# Patient Record
Sex: FEMALE | Race: WHITE | NOT HISPANIC OR LATINO | URBAN - NONMETROPOLITAN AREA
[De-identification: names, ages, dates, MRNs, and addresses within clinical notes are randomized per-mention and may not be internally consistent; named-entity substitution may affect disease eponyms.]

---

## 2017-11-30 ENCOUNTER — OFFICE VISIT (OUTPATIENT)
Dept: FAMILY MEDICINE CLINIC | Facility: CLINIC | Age: 27
End: 2017-11-30

## 2017-11-30 VITALS
TEMPERATURE: 99.2 F | DIASTOLIC BLOOD PRESSURE: 78 MMHG | SYSTOLIC BLOOD PRESSURE: 120 MMHG | HEIGHT: 64 IN | HEART RATE: 108 BPM | OXYGEN SATURATION: 99 % | BODY MASS INDEX: 17.55 KG/M2 | WEIGHT: 102.8 LBS

## 2017-11-30 DIAGNOSIS — J06.9 ACUTE URI: ICD-10-CM

## 2017-11-30 DIAGNOSIS — M25.531 RIGHT WRIST PAIN: Primary | ICD-10-CM

## 2017-11-30 PROCEDURE — 99213 OFFICE O/P EST LOW 20 MIN: CPT | Performed by: FAMILY MEDICINE

## 2017-11-30 RX ORDER — GUAIFENESIN/DEXTROMETHORPHAN 100-10MG/5
10 SYRUP ORAL 4 TIMES DAILY PRN
Qty: 240 ML | Refills: 1 | Status: SHIPPED | OUTPATIENT
Start: 2017-11-30 | End: 2018-12-06

## 2017-11-30 RX ORDER — FLUTICASONE PROPIONATE 50 MCG
2 SPRAY, SUSPENSION (ML) NASAL DAILY
Qty: 1 BOTTLE | Refills: 11 | Status: SHIPPED | OUTPATIENT
Start: 2017-11-30 | End: 2018-12-06

## 2017-11-30 RX ORDER — IBUPROFEN 600 MG/1
600 TABLET ORAL EVERY 8 HOURS PRN
Qty: 60 TABLET | Refills: 0 | Status: SHIPPED | OUTPATIENT
Start: 2017-11-30 | End: 2018-12-06

## 2017-11-30 RX ORDER — LORATADINE 10 MG/1
10 TABLET ORAL DAILY
Qty: 30 TABLET | Refills: 11 | Status: SHIPPED | OUTPATIENT
Start: 2017-11-30 | End: 2018-12-06

## 2017-11-30 RX ORDER — ALBUTEROL SULFATE 90 UG/1
AEROSOL, METERED RESPIRATORY (INHALATION)
Refills: 0 | COMMUNITY
Start: 2017-10-08 | End: 2018-12-06

## 2017-11-30 NOTE — PROGRESS NOTES
Subjective   Blondy Jay Alas is a 26 y.o. female.     History of Present Illness     Right wrist pain 3 days.  No trauma but she says she is clumsey.    Also sinus symptoms and cough    Review of Systems   Constitutional: Negative for chills, fatigue and fever.   HENT: Positive for congestion and rhinorrhea. Negative for ear discharge, ear pain, facial swelling, hearing loss, postnasal drip, sinus pressure, sore throat, trouble swallowing and voice change.    Eyes: Negative for discharge, redness and visual disturbance.   Respiratory: Positive for cough. Negative for chest tightness, shortness of breath and wheezing.    Cardiovascular: Negative for chest pain and palpitations.   Gastrointestinal: Negative for abdominal pain, blood in stool, constipation, diarrhea, nausea and vomiting.   Endocrine: Negative for polydipsia and polyuria.   Genitourinary: Negative for dysuria, flank pain, hematuria and urgency.   Musculoskeletal: Negative for arthralgias, back pain, joint swelling and myalgias.   Skin: Negative for rash.   Neurological: Negative for dizziness, weakness, numbness and headaches.   Hematological: Negative for adenopathy.   Psychiatric/Behavioral: Negative for confusion and sleep disturbance. The patient is not nervous/anxious.        Objective   Physical Exam   Constitutional: She is oriented to person, place, and time. She appears well-developed and well-nourished.   HENT:   Head: Normocephalic and atraumatic.   Right Ear: External ear normal.   Left Ear: External ear normal.   Nose: Nose normal.   Eyes: Conjunctivae and EOM are normal. Pupils are equal, round, and reactive to light.   Neck: Normal range of motion.   Pulmonary/Chest: Effort normal.   Musculoskeletal: Normal range of motion.   Posterior distal ulna little more prominent right than left  But no swelling.  Some pain distal ulna with ulnar deviation of hand. No bruising, no redness.    Neurological: She is alert and oriented to  person, place, and time.   Psychiatric: She has a normal mood and affect. Her behavior is normal. Judgment and thought content normal.   Nursing note and vitals reviewed.      Assessment/Plan   Sebastian was seen today for wrist pain.    Diagnoses and all orders for this visit:    Right wrist pain  -     XR Wrist 3+ View Right    Acute URI    Other orders  -     loratadine (CLARITIN) 10 MG tablet; Take 1 tablet by mouth Daily.  -     fluticasone (FLONASE) 50 MCG/ACT nasal spray; 2 sprays into each nostril Daily.  -     ibuprofen (ADVIL,MOTRIN) 600 MG tablet; Take 1 tablet by mouth Every 8 (Eight) Hours As Needed for Mild Pain .  -     guaifenesin-dextromethorphan (ROBITUSSIN DM) 100-10 MG/5ML syrup; Take 10 mL by mouth 4 (Four) Times a Day As Needed for Cough.      Xray looks fine.  She has ace wrap at home.   Encouraged to stop smoking

## 2018-06-28 ENCOUNTER — HOSPITAL ENCOUNTER (OUTPATIENT)
Dept: ULTRASOUND IMAGING | Facility: HOSPITAL | Age: 28
Discharge: HOME | End: 2018-06-28
Admitting: OBSTETRICS & GYNECOLOGY

## 2018-06-28 DIAGNOSIS — R10.31 RIGHT LOWER QUADRANT PAIN: ICD-10-CM

## 2018-06-28 DIAGNOSIS — Z32.01 POSITIVE PREGNANCY TEST: ICD-10-CM

## 2018-06-28 PROCEDURE — 76817 TRANSVAGINAL US OBSTETRIC: CPT

## 2018-11-19 ENCOUNTER — HOSPITAL ENCOUNTER (OUTPATIENT)
Facility: HOSPITAL | Age: 28
Discharge: HOME | End: 2018-11-20
Attending: OBSTETRICS & GYNECOLOGY | Admitting: OBSTETRICS & GYNECOLOGY

## 2018-11-19 VITALS
OXYGEN SATURATION: 98 % | TEMPERATURE: 98.1 F | RESPIRATION RATE: 18 BRPM | DIASTOLIC BLOOD PRESSURE: 70 MMHG | HEART RATE: 107 BPM | SYSTOLIC BLOOD PRESSURE: 113 MMHG

## 2018-11-19 PROBLEM — Z37.9 NORMAL LABOR: Status: ACTIVE | Noted: 2018-11-19

## 2018-11-19 LAB
BACTERIA UR QL AUTO: ABNORMAL /HPF
BILIRUB UR QL STRIP: NEGATIVE
CLARITY UR: ABNORMAL
COLOR UR: YELLOW
GLUCOSE UR STRIP-MCNC: NEGATIVE MG/DL
HGB UR QL STRIP.AUTO: ABNORMAL
HYALINE CASTS UR QL AUTO: ABNORMAL /LPF
KETONES UR QL STRIP: NEGATIVE
LEUKOCYTE ESTERASE UR QL STRIP.AUTO: ABNORMAL
NITRITE UR QL STRIP: NEGATIVE
PH UR STRIP.AUTO: 6 [PH] (ref 5–9)
PROT UR QL STRIP: NEGATIVE
RBC # UR: ABNORMAL /HPF
REF LAB TEST METHOD: ABNORMAL
SP GR UR STRIP: 1.01 (ref 1–1.03)
SQUAMOUS #/AREA URNS HPF: ABNORMAL /HPF
TRICHOMONAS #/AREA URNS HPF: ABNORMAL /HPF
UROBILINOGEN UR QL STRIP: ABNORMAL
WBC UR QL AUTO: ABNORMAL /HPF

## 2018-11-19 PROCEDURE — 81001 URINALYSIS AUTO W/SCOPE: CPT | Performed by: OBSTETRICS & GYNECOLOGY

## 2018-11-19 PROCEDURE — 87086 URINE CULTURE/COLONY COUNT: CPT | Performed by: OBSTETRICS & GYNECOLOGY

## 2018-11-19 RX ORDER — METRONIDAZOLE 500 MG/1
TABLET ORAL
Status: DISCONTINUED
Start: 2018-11-19 | End: 2018-11-20 | Stop reason: HOSPADM

## 2018-11-19 RX ORDER — SODIUM CHLORIDE 0.9 % (FLUSH) 0.9 %
3-10 SYRINGE (ML) INJECTION AS NEEDED
Status: DISCONTINUED | OUTPATIENT
Start: 2018-11-19 | End: 2018-11-19

## 2018-11-19 RX ORDER — METRONIDAZOLE 500 MG/1
2000 TABLET ORAL ONCE
Status: COMPLETED | OUTPATIENT
Start: 2018-11-20 | End: 2018-11-19

## 2018-11-19 RX ORDER — SODIUM CHLORIDE, SODIUM LACTATE, POTASSIUM CHLORIDE, CALCIUM CHLORIDE 600; 310; 30; 20 MG/100ML; MG/100ML; MG/100ML; MG/100ML
INJECTION, SOLUTION INTRAVENOUS
Status: COMPLETED
Start: 2018-11-19 | End: 2018-11-19

## 2018-11-19 RX ORDER — MISOPROSTOL 200 UG/1
800 TABLET ORAL AS NEEDED
Status: CANCELLED | OUTPATIENT
Start: 2018-11-19

## 2018-11-19 RX ORDER — SODIUM CHLORIDE 0.9 % (FLUSH) 0.9 %
5 SYRINGE (ML) INJECTION AS NEEDED
Status: DISCONTINUED | OUTPATIENT
Start: 2018-11-19 | End: 2018-11-20 | Stop reason: HOSPADM

## 2018-11-19 RX ORDER — SODIUM CHLORIDE 0.9 % (FLUSH) 0.9 %
3 SYRINGE (ML) INJECTION EVERY 12 HOURS SCHEDULED
Status: DISCONTINUED | OUTPATIENT
Start: 2018-11-19 | End: 2018-11-20 | Stop reason: HOSPADM

## 2018-11-19 RX ORDER — CARBOPROST TROMETHAMINE 250 UG/ML
250 INJECTION, SOLUTION INTRAMUSCULAR AS NEEDED
Status: CANCELLED | OUTPATIENT
Start: 2018-11-19

## 2018-11-19 RX ORDER — SODIUM CHLORIDE, SODIUM LACTATE, POTASSIUM CHLORIDE, CALCIUM CHLORIDE 600; 310; 30; 20 MG/100ML; MG/100ML; MG/100ML; MG/100ML
125 INJECTION, SOLUTION INTRAVENOUS CONTINUOUS
Status: DISCONTINUED | OUTPATIENT
Start: 2018-11-19 | End: 2018-11-19

## 2018-11-19 RX ORDER — METHYLERGONOVINE MALEATE 0.2 MG/ML
200 INJECTION INTRAVENOUS ONCE AS NEEDED
Status: CANCELLED | OUTPATIENT
Start: 2018-11-19

## 2018-11-19 RX ORDER — SODIUM CHLORIDE 0.9 % (FLUSH) 0.9 %
3 SYRINGE (ML) INJECTION EVERY 12 HOURS SCHEDULED
Status: DISCONTINUED | OUTPATIENT
Start: 2018-11-19 | End: 2018-11-19

## 2018-11-19 RX ORDER — SODIUM CHLORIDE, SODIUM LACTATE, POTASSIUM CHLORIDE, CALCIUM CHLORIDE 600; 310; 30; 20 MG/100ML; MG/100ML; MG/100ML; MG/100ML
INJECTION, SOLUTION INTRAVENOUS
Status: DISCONTINUED
Start: 2018-11-19 | End: 2018-11-20 | Stop reason: HOSPADM

## 2018-11-19 RX ADMIN — METRONIDAZOLE 2000 MG: 500 TABLET ORAL at 23:53

## 2018-11-19 RX ADMIN — SODIUM CHLORIDE, SODIUM LACTATE, POTASSIUM CHLORIDE, AND CALCIUM CHLORIDE 1000 ML: 600; 310; 30; 20 INJECTION, SOLUTION INTRAVENOUS at 23:52

## 2018-11-20 PROCEDURE — 59025 FETAL NON-STRESS TEST: CPT

## 2018-11-20 PROCEDURE — G0463 HOSPITAL OUTPT CLINIC VISIT: HCPCS

## 2018-11-20 PROCEDURE — 59025 FETAL NON-STRESS TEST: CPT | Performed by: OBSTETRICS & GYNECOLOGY

## 2018-11-20 RX ORDER — SODIUM CHLORIDE, SODIUM LACTATE, POTASSIUM CHLORIDE, CALCIUM CHLORIDE 600; 310; 30; 20 MG/100ML; MG/100ML; MG/100ML; MG/100ML
INJECTION, SOLUTION INTRAVENOUS
Status: DISCONTINUED
Start: 2018-11-20 | End: 2018-11-20 | Stop reason: WASHOUT

## 2018-11-20 NOTE — DISCHARGE INSTR - ACTIVITY
Return if vaginal bleeding, think water has broken, decreased fetal movements, or strong regular contractions. Drink plenty of water and keep all scheduled Dr appointments. Make appointment in 2 weeks to be tested again.

## 2018-11-20 NOTE — NON STRESS TEST
Sebastian Toledoky Alas, a  at 26w5d with an ALYSSA of 2019, by Patient Reported, was seen at [facility] LABOR DELIVERY for a nonstress test.    Chief Complaint   Patient presents with   • Contractions     Pt c/o contractions since around 1900 this evening.  Denies vaginal bleeding or leaking fluid.  Reports fetal movement.         Patient Active Problem List   Diagnosis   • Normal labor       Start Time: 15 Stop Time:45    Interpretation A  Nonstress Test Interpretation A: Reactive (18 : Ashtyn Pires, RN)  Comments A: reviewed with MARC Torres RN (18 : Ashtyn Pires, RN)  Pt received 2 bags of LR bolused. Trichimonas noted in urine.  Treated before discharged.  Instructed to follow up for retest in 2 weeks with

## 2018-11-21 LAB — BACTERIA SPEC AEROBE CULT: NORMAL

## 2018-11-29 ENCOUNTER — ROUTINE PRENATAL (OUTPATIENT)
Dept: OBSTETRICS AND GYNECOLOGY | Facility: CLINIC | Age: 28
End: 2018-11-29

## 2018-11-29 VITALS — WEIGHT: 137.5 LBS | BODY MASS INDEX: 23.97 KG/M2 | DIASTOLIC BLOOD PRESSURE: 70 MMHG | SYSTOLIC BLOOD PRESSURE: 104 MMHG

## 2018-11-29 PROCEDURE — 96372 THER/PROPH/DIAG INJ SC/IM: CPT | Performed by: OBSTETRICS & GYNECOLOGY

## 2018-11-29 RX ORDER — HYDROXYPROGESTERONE CAPROATE 250 MG/ML
250 INJECTION INTRAMUSCULAR
Status: DISCONTINUED | OUTPATIENT
Start: 2018-11-29 | End: 2019-01-28

## 2018-11-29 RX ADMIN — HYDROXYPROGESTERONE CAPROATE 250 MG: 250 INJECTION INTRAMUSCULAR at 13:10

## 2018-12-06 ENCOUNTER — ROUTINE PRENATAL (OUTPATIENT)
Dept: OBSTETRICS AND GYNECOLOGY | Facility: CLINIC | Age: 28
End: 2018-12-06

## 2018-12-06 VITALS
BODY MASS INDEX: 23.91 KG/M2 | WEIGHT: 137.13 LBS | SYSTOLIC BLOOD PRESSURE: 103 MMHG | DIASTOLIC BLOOD PRESSURE: 59 MMHG

## 2018-12-06 DIAGNOSIS — O09.899 HISTORY OF PRETERM DELIVERY, CURRENTLY PREGNANT: ICD-10-CM

## 2018-12-06 DIAGNOSIS — Z86.19 HISTORY OF TRICHOMONIASIS: Primary | ICD-10-CM

## 2018-12-06 PROCEDURE — 87661 TRICHOMONAS VAGINALIS AMPLIF: CPT | Performed by: OBSTETRICS & GYNECOLOGY

## 2018-12-06 PROCEDURE — 87491 CHLMYD TRACH DNA AMP PROBE: CPT | Performed by: OBSTETRICS & GYNECOLOGY

## 2018-12-06 PROCEDURE — 99213 OFFICE O/P EST LOW 20 MIN: CPT | Performed by: OBSTETRICS & GYNECOLOGY

## 2018-12-06 PROCEDURE — 87591 N.GONORRHOEAE DNA AMP PROB: CPT | Performed by: OBSTETRICS & GYNECOLOGY

## 2018-12-06 PROCEDURE — 96372 THER/PROPH/DIAG INJ SC/IM: CPT | Performed by: OBSTETRICS & GYNECOLOGY

## 2018-12-06 RX ADMIN — HYDROXYPROGESTERONE CAPROATE 250 MG: 250 INJECTION INTRAMUSCULAR at 13:14

## 2018-12-07 LAB
C TRACH RRNA CVX QL NAA+PROBE: NEGATIVE
N GONORRHOEA RRNA SPEC QL NAA+PROBE: NEGATIVE
TRICHOMONAS VAGINALIS PCR: NEGATIVE

## 2018-12-13 ENCOUNTER — CLINICAL SUPPORT (OUTPATIENT)
Dept: OBSTETRICS AND GYNECOLOGY | Facility: CLINIC | Age: 28
End: 2018-12-13

## 2018-12-13 PROCEDURE — 96372 THER/PROPH/DIAG INJ SC/IM: CPT | Performed by: OBSTETRICS & GYNECOLOGY

## 2018-12-13 RX ADMIN — HYDROXYPROGESTERONE CAPROATE 250 MG: 250 INJECTION INTRAMUSCULAR at 14:32

## 2018-12-17 NOTE — PROGRESS NOTES
Complaint prenatal care    Patient transferred care from [facility] we don't have all her records available number    #1 history  birth on making XE back in 1 week

## 2018-12-18 RX ORDER — HYDROXYPROGESTERONE CAPROATE 250 MG/ML
250 INJECTION INTRAMUSCULAR
Status: DISCONTINUED | OUTPATIENT
Start: 2018-12-18 | End: 2018-12-27

## 2018-12-20 ENCOUNTER — ROUTINE PRENATAL (OUTPATIENT)
Dept: OBSTETRICS AND GYNECOLOGY | Facility: CLINIC | Age: 28
End: 2018-12-20

## 2018-12-20 VITALS — SYSTOLIC BLOOD PRESSURE: 110 MMHG | WEIGHT: 141.6 LBS | BODY MASS INDEX: 24.69 KG/M2 | DIASTOLIC BLOOD PRESSURE: 70 MMHG

## 2018-12-20 DIAGNOSIS — Z87.51 HISTORY OF PRETERM DELIVERY: ICD-10-CM

## 2018-12-20 DIAGNOSIS — O09.93 HIGH-RISK PREGNANCY, THIRD TRIMESTER: Primary | ICD-10-CM

## 2018-12-20 DIAGNOSIS — Z3A.31 31 WEEKS GESTATION OF PREGNANCY: ICD-10-CM

## 2018-12-20 DIAGNOSIS — O60.03 PRETERM LABOR IN THIRD TRIMESTER WITHOUT DELIVERY: ICD-10-CM

## 2018-12-20 PROCEDURE — 96372 THER/PROPH/DIAG INJ SC/IM: CPT | Performed by: OBSTETRICS & GYNECOLOGY

## 2018-12-20 PROCEDURE — 99213 OFFICE O/P EST LOW 20 MIN: CPT | Performed by: OBSTETRICS & GYNECOLOGY

## 2018-12-20 RX ORDER — ALBUTEROL SULFATE 90 UG/1
2 AEROSOL, METERED RESPIRATORY (INHALATION) EVERY 6 HOURS PRN
COMMUNITY
Start: 2018-12-10 | End: 2019-09-19

## 2018-12-20 RX ADMIN — HYDROXYPROGESTERONE CAPROATE 250 MG: 250 INJECTION INTRAMUSCULAR at 14:41

## 2018-12-27 ENCOUNTER — APPOINTMENT (OUTPATIENT)
Dept: ULTRASOUND IMAGING | Facility: HOSPITAL | Age: 28
End: 2018-12-27

## 2018-12-27 ENCOUNTER — ROUTINE PRENATAL (OUTPATIENT)
Dept: OBSTETRICS AND GYNECOLOGY | Facility: CLINIC | Age: 28
End: 2018-12-27

## 2018-12-27 ENCOUNTER — HOSPITAL ENCOUNTER (OUTPATIENT)
Facility: HOSPITAL | Age: 28
Setting detail: OBSERVATION
Discharge: HOME | End: 2018-12-28
Attending: OBSTETRICS & GYNECOLOGY | Admitting: OBSTETRICS & GYNECOLOGY

## 2018-12-27 ENCOUNTER — LAB (OUTPATIENT)
Dept: LAB | Facility: HOSPITAL | Age: 28
End: 2018-12-27

## 2018-12-27 VITALS — DIASTOLIC BLOOD PRESSURE: 73 MMHG | WEIGHT: 141.8 LBS | BODY MASS INDEX: 24.72 KG/M2 | SYSTOLIC BLOOD PRESSURE: 127 MMHG

## 2018-12-27 DIAGNOSIS — Z3A.32 32 WEEKS GESTATION OF PREGNANCY: Primary | ICD-10-CM

## 2018-12-27 DIAGNOSIS — O23.593 VAGINITIS AFFECTING PREGNANCY IN THIRD TRIMESTER, ANTEPARTUM: ICD-10-CM

## 2018-12-27 DIAGNOSIS — O09.213 HISTORY OF PRETERM LABOR, CURRENT PREGNANCY, THIRD TRIMESTER: ICD-10-CM

## 2018-12-27 DIAGNOSIS — O09.93 HIGH-RISK PREGNANCY, THIRD TRIMESTER: ICD-10-CM

## 2018-12-27 DIAGNOSIS — O47.03 PRETERM UTERINE CONTRACTIONS, ANTEPARTUM, THIRD TRIMESTER: ICD-10-CM

## 2018-12-27 PROBLEM — R87.89 POSITIVE FETAL FIBRONECTIN AT 22 WEEKS TO 34 WEEKS GESTATION: Status: ACTIVE | Noted: 2018-12-27

## 2018-12-27 PROBLEM — O09.899 POSITIVE FETAL FIBRONECTIN AT 22 WEEKS TO 34 WEEKS GESTATION: Status: ACTIVE | Noted: 2018-12-27

## 2018-12-27 LAB
ABO GROUP BLD: NORMAL
AMPHET+METHAMPHET UR QL: NEGATIVE
BARBITURATES UR QL SCN: NEGATIVE
BENZODIAZ UR QL SCN: NEGATIVE
BLD GP AB SCN SERPL QL: NEGATIVE
BLOODY SPECIMEN?: ABNORMAL
CANDIDA ALBICANS: NEGATIVE
CANNABINOIDS SERPL QL: NEGATIVE
COCAINE UR QL: NEGATIVE
DEPRECATED RDW RBC AUTO: 41.9 FL (ref 36.4–46.3)
ERYTHROCYTE [DISTWIDTH] IN BLOOD BY AUTOMATED COUNT: 13.1 % (ref 11.5–14.5)
FIBRONECTIN FETAL VAG QL: POSITIVE
GARDNERELLA VAGINALIS: POSITIVE
GLUCOSE 1H P 100 G GLC PO SERPL-MCNC: 105 MG/DL (ref 60–140)
HCT VFR BLD AUTO: 34.2 % (ref 35–45)
HGB BLD-MCNC: 12 G/DL (ref 12–15.5)
Lab: NORMAL
MCH RBC QN AUTO: 30.3 PG (ref 26.5–34)
MCHC RBC AUTO-ENTMCNC: 35.1 G/DL (ref 31.4–36)
MCV RBC AUTO: 86.4 FL (ref 80–98)
METHADONE UR QL SCN: NEGATIVE
OPIATES UR QL: NEGATIVE
OXYCODONE UR QL SCN: NEGATIVE
PLATELET # BLD AUTO: 309 10*3/MM3 (ref 150–450)
PMV BLD AUTO: 9.7 FL (ref 8–12)
RBC # BLD AUTO: 3.96 10*6/MM3 (ref 3.77–5.16)
RH BLD: POSITIVE
T&S EXPIRATION DATE: NORMAL
TRICHOMONAS VAGINALIS PCR: NEGATIVE
WBC NRBC COR # BLD: 20.33 10*3/MM3 (ref 3.2–9.8)

## 2018-12-27 PROCEDURE — 87480 CANDIDA DNA DIR PROBE: CPT | Performed by: OBSTETRICS & GYNECOLOGY

## 2018-12-27 PROCEDURE — 80307 DRUG TEST PRSMV CHEM ANLYZR: CPT | Performed by: OBSTETRICS & GYNECOLOGY

## 2018-12-27 PROCEDURE — 99220 PR INITIAL OBSERVATION CARE/DAY 70 MINUTES: CPT | Performed by: OBSTETRICS & GYNECOLOGY

## 2018-12-27 PROCEDURE — 96372 THER/PROPH/DIAG INJ SC/IM: CPT | Performed by: OBSTETRICS & GYNECOLOGY

## 2018-12-27 PROCEDURE — 86900 BLOOD TYPING SEROLOGIC ABO: CPT | Performed by: OBSTETRICS & GYNECOLOGY

## 2018-12-27 PROCEDURE — 59025 FETAL NON-STRESS TEST: CPT

## 2018-12-27 PROCEDURE — 76805 OB US >/= 14 WKS SNGL FETUS: CPT

## 2018-12-27 PROCEDURE — 59025 FETAL NON-STRESS TEST: CPT | Performed by: OBSTETRICS & GYNECOLOGY

## 2018-12-27 PROCEDURE — 82950 GLUCOSE TEST: CPT

## 2018-12-27 PROCEDURE — 25010000002 BETAMETHASONE ACET & SOD PHOS PER 4 MG: Performed by: OBSTETRICS & GYNECOLOGY

## 2018-12-27 PROCEDURE — 87510 GARDNER VAG DNA DIR PROBE: CPT | Performed by: OBSTETRICS & GYNECOLOGY

## 2018-12-27 PROCEDURE — 76817 TRANSVAGINAL US OBSTETRIC: CPT

## 2018-12-27 PROCEDURE — 85027 COMPLETE CBC AUTOMATED: CPT | Performed by: OBSTETRICS & GYNECOLOGY

## 2018-12-27 PROCEDURE — 25010000002 TERBUTALINE PER 1 MG

## 2018-12-27 PROCEDURE — 86901 BLOOD TYPING SEROLOGIC RH(D): CPT | Performed by: OBSTETRICS & GYNECOLOGY

## 2018-12-27 PROCEDURE — G0378 HOSPITAL OBSERVATION PER HR: HCPCS

## 2018-12-27 PROCEDURE — 87660 TRICHOMONAS VAGIN DIR PROBE: CPT | Performed by: OBSTETRICS & GYNECOLOGY

## 2018-12-27 PROCEDURE — 96372 THER/PROPH/DIAG INJ SC/IM: CPT

## 2018-12-27 PROCEDURE — 82731 ASSAY OF FETAL FIBRONECTIN: CPT | Performed by: OBSTETRICS & GYNECOLOGY

## 2018-12-27 PROCEDURE — 86850 RBC ANTIBODY SCREEN: CPT | Performed by: OBSTETRICS & GYNECOLOGY

## 2018-12-27 PROCEDURE — 99213 OFFICE O/P EST LOW 20 MIN: CPT | Performed by: OBSTETRICS & GYNECOLOGY

## 2018-12-27 RX ORDER — DEXTROSE, SODIUM CHLORIDE, SODIUM LACTATE, POTASSIUM CHLORIDE, AND CALCIUM CHLORIDE 5; .6; .31; .03; .02 G/100ML; G/100ML; G/100ML; G/100ML; G/100ML
999 INJECTION, SOLUTION INTRAVENOUS ONCE
Status: COMPLETED | OUTPATIENT
Start: 2018-12-28 | End: 2018-12-27

## 2018-12-27 RX ORDER — BETAMETHASONE SODIUM PHOSPHATE AND BETAMETHASONE ACETATE 3; 3 MG/ML; MG/ML
12 INJECTION, SUSPENSION INTRA-ARTICULAR; INTRALESIONAL; INTRAMUSCULAR; SOFT TISSUE EVERY 24 HOURS
Status: COMPLETED | OUTPATIENT
Start: 2018-12-27 | End: 2018-12-28

## 2018-12-27 RX ORDER — SODIUM CHLORIDE 0.9 % (FLUSH) 0.9 %
3-10 SYRINGE (ML) INJECTION AS NEEDED
Status: DISCONTINUED | OUTPATIENT
Start: 2018-12-27 | End: 2018-12-27

## 2018-12-27 RX ORDER — LIDOCAINE HYDROCHLORIDE 10 MG/ML
5 INJECTION, SOLUTION EPIDURAL; INFILTRATION; INTRACAUDAL; PERINEURAL AS NEEDED
Status: DISCONTINUED | OUTPATIENT
Start: 2018-12-27 | End: 2018-12-27

## 2018-12-27 RX ORDER — DEXTROSE, SODIUM CHLORIDE, SODIUM LACTATE, POTASSIUM CHLORIDE, AND CALCIUM CHLORIDE 5; .6; .31; .03; .02 G/100ML; G/100ML; G/100ML; G/100ML; G/100ML
75 INJECTION, SOLUTION INTRAVENOUS CONTINUOUS
Status: DISCONTINUED | OUTPATIENT
Start: 2018-12-28 | End: 2018-12-28 | Stop reason: HOSPADM

## 2018-12-27 RX ORDER — HYDROXYPROGESTERONE CAPROATE 250 MG/ML
250 INJECTION INTRAMUSCULAR
Status: DISCONTINUED | OUTPATIENT
Start: 2018-12-27 | End: 2018-12-27

## 2018-12-27 RX ORDER — DEXTROSE, SODIUM CHLORIDE, SODIUM LACTATE, POTASSIUM CHLORIDE, AND CALCIUM CHLORIDE 5; .6; .31; .03; .02 G/100ML; G/100ML; G/100ML; G/100ML; G/100ML
INJECTION, SOLUTION INTRAVENOUS
Status: COMPLETED
Start: 2018-12-27 | End: 2018-12-27

## 2018-12-27 RX ORDER — TERBUTALINE SULFATE 1 MG/ML
0.25 INJECTION, SOLUTION SUBCUTANEOUS ONCE
Status: COMPLETED | OUTPATIENT
Start: 2018-12-28 | End: 2018-12-27

## 2018-12-27 RX ORDER — SODIUM CHLORIDE 0.9 % (FLUSH) 0.9 %
5 SYRINGE (ML) INJECTION AS NEEDED
Status: DISCONTINUED | OUTPATIENT
Start: 2018-12-27 | End: 2018-12-28 | Stop reason: HOSPADM

## 2018-12-27 RX ORDER — SODIUM CHLORIDE 0.9 % (FLUSH) 0.9 %
3 SYRINGE (ML) INJECTION EVERY 12 HOURS SCHEDULED
Status: DISCONTINUED | OUTPATIENT
Start: 2018-12-28 | End: 2018-12-28 | Stop reason: HOSPADM

## 2018-12-27 RX ORDER — SODIUM CHLORIDE 0.9 % (FLUSH) 0.9 %
3 SYRINGE (ML) INJECTION EVERY 12 HOURS SCHEDULED
Status: DISCONTINUED | OUTPATIENT
Start: 2018-12-27 | End: 2018-12-27

## 2018-12-27 RX ORDER — TERBUTALINE SULFATE 1 MG/ML
INJECTION, SOLUTION SUBCUTANEOUS
Status: COMPLETED
Start: 2018-12-27 | End: 2018-12-27

## 2018-12-27 RX ORDER — ZOLPIDEM TARTRATE 5 MG/1
5 TABLET ORAL NIGHTLY PRN
Status: DISCONTINUED | OUTPATIENT
Start: 2018-12-27 | End: 2018-12-28 | Stop reason: HOSPADM

## 2018-12-27 RX ADMIN — SODIUM CHLORIDE, SODIUM LACTATE, POTASSIUM CHLORIDE, CALCIUM CHLORIDE AND DEXTROSE MONOHYDRATE 999 ML/HR: 5; 600; 310; 30; 20 INJECTION, SOLUTION INTRAVENOUS at 23:51

## 2018-12-27 RX ADMIN — HYDROXYPROGESTERONE CAPROATE 250 MG: 250 INJECTION INTRAMUSCULAR at 12:08

## 2018-12-27 RX ADMIN — BETAMETHASONE SODIUM PHOSPHATE AND BETAMETHASONE ACETATE 12 MG: 3; 3 INJECTION, SUSPENSION INTRA-ARTICULAR; INTRALESIONAL; INTRAMUSCULAR at 19:18

## 2018-12-27 RX ADMIN — TERBUTALINE SULFATE 0.25 MG: 1 INJECTION, SOLUTION SUBCUTANEOUS at 23:47

## 2018-12-27 RX ADMIN — DEXTROSE, SODIUM CHLORIDE, SODIUM LACTATE, POTASSIUM CHLORIDE, AND CALCIUM CHLORIDE 999 ML/HR: 5; .6; .31; .03; .02 INJECTION, SOLUTION INTRAVENOUS at 23:51

## 2018-12-28 ENCOUNTER — APPOINTMENT (OUTPATIENT)
Dept: ULTRASOUND IMAGING | Facility: HOSPITAL | Age: 28
End: 2018-12-28

## 2018-12-28 VITALS
HEART RATE: 91 BPM | TEMPERATURE: 98.8 F | BODY MASS INDEX: 24.59 KG/M2 | SYSTOLIC BLOOD PRESSURE: 117 MMHG | DIASTOLIC BLOOD PRESSURE: 57 MMHG | RESPIRATION RATE: 18 BRPM | WEIGHT: 141 LBS | OXYGEN SATURATION: 98 %

## 2018-12-28 PROCEDURE — 59025 FETAL NON-STRESS TEST: CPT | Performed by: OBSTETRICS & GYNECOLOGY

## 2018-12-28 PROCEDURE — 25010000002 ONDANSETRON PER 1 MG: Performed by: OBSTETRICS & GYNECOLOGY

## 2018-12-28 PROCEDURE — 96374 THER/PROPH/DIAG INJ IV PUSH: CPT

## 2018-12-28 PROCEDURE — G0463 HOSPITAL OUTPT CLINIC VISIT: HCPCS

## 2018-12-28 PROCEDURE — 96372 THER/PROPH/DIAG INJ SC/IM: CPT

## 2018-12-28 PROCEDURE — 59025 FETAL NON-STRESS TEST: CPT

## 2018-12-28 PROCEDURE — 99217 PR OBSERVATION CARE DISCHARGE MANAGEMENT: CPT | Performed by: OBSTETRICS & GYNECOLOGY

## 2018-12-28 PROCEDURE — 76820 UMBILICAL ARTERY ECHO: CPT

## 2018-12-28 PROCEDURE — 25010000002 TERBUTALINE PER 1 MG: Performed by: OBSTETRICS & GYNECOLOGY

## 2018-12-28 PROCEDURE — 96361 HYDRATE IV INFUSION ADD-ON: CPT

## 2018-12-28 PROCEDURE — G0378 HOSPITAL OBSERVATION PER HR: HCPCS

## 2018-12-28 PROCEDURE — 25010000002 BETAMETHASONE ACET & SOD PHOS PER 4 MG: Performed by: OBSTETRICS & GYNECOLOGY

## 2018-12-28 PROCEDURE — 76819 FETAL BIOPHYS PROFIL W/O NST: CPT

## 2018-12-28 RX ORDER — ONDANSETRON HCL IN 0.9 % NACL 8 MG/50 ML
8 INTRAVENOUS SOLUTION, PIGGYBACK (ML) INTRAVENOUS EVERY 6 HOURS PRN
Status: DISCONTINUED | OUTPATIENT
Start: 2018-12-28 | End: 2018-12-28 | Stop reason: HOSPADM

## 2018-12-28 RX ORDER — NIFEDIPINE 10 MG/1
20 CAPSULE ORAL EVERY 6 HOURS SCHEDULED
Status: DISCONTINUED | OUTPATIENT
Start: 2018-12-28 | End: 2018-12-28 | Stop reason: HOSPADM

## 2018-12-28 RX ORDER — ACETAMINOPHEN 500 MG
1000 TABLET ORAL EVERY 4 HOURS PRN
Status: DISCONTINUED | OUTPATIENT
Start: 2018-12-28 | End: 2018-12-28 | Stop reason: HOSPADM

## 2018-12-28 RX ORDER — TERBUTALINE SULFATE 1 MG/ML
0.25 INJECTION, SOLUTION SUBCUTANEOUS ONCE
Status: COMPLETED | OUTPATIENT
Start: 2018-12-28 | End: 2018-12-28

## 2018-12-28 RX ORDER — DEXTROSE, SODIUM CHLORIDE, SODIUM LACTATE, POTASSIUM CHLORIDE, AND CALCIUM CHLORIDE 5; .6; .31; .03; .02 G/100ML; G/100ML; G/100ML; G/100ML; G/100ML
INJECTION, SOLUTION INTRAVENOUS
Status: DISCONTINUED
Start: 2018-12-28 | End: 2018-12-28 | Stop reason: HOSPADM

## 2018-12-28 RX ORDER — PROMETHAZINE HYDROCHLORIDE 25 MG/ML
12.5 INJECTION, SOLUTION INTRAMUSCULAR; INTRAVENOUS EVERY 4 HOURS PRN
Status: DISCONTINUED | OUTPATIENT
Start: 2018-12-28 | End: 2018-12-28 | Stop reason: HOSPADM

## 2018-12-28 RX ORDER — METRONIDAZOLE 7.5 MG/G
GEL VAGINAL NIGHTLY
Status: DISCONTINUED | OUTPATIENT
Start: 2018-12-28 | End: 2018-12-28 | Stop reason: HOSPADM

## 2018-12-28 RX ORDER — CALCIUM CARBONATE 200(500)MG
TABLET,CHEWABLE ORAL
Status: COMPLETED
Start: 2018-12-28 | End: 2018-12-28

## 2018-12-28 RX ORDER — CALCIUM CARBONATE 200(500)MG
2 TABLET,CHEWABLE ORAL 3 TIMES DAILY PRN
Status: DISCONTINUED | OUTPATIENT
Start: 2018-12-28 | End: 2018-12-28 | Stop reason: HOSPADM

## 2018-12-28 RX ORDER — NIFEDIPINE 10 MG/1
CAPSULE ORAL
Status: COMPLETED
Start: 2018-12-28 | End: 2018-12-28

## 2018-12-28 RX ADMIN — TERBUTALINE SULFATE 0.25 MG: 1 INJECTION SUBCUTANEOUS at 04:44

## 2018-12-28 RX ADMIN — Medication 2 TABLET: at 00:16

## 2018-12-28 RX ADMIN — NIFEDIPINE 20 MG: 10 CAPSULE ORAL at 12:50

## 2018-12-28 RX ADMIN — SODIUM CHLORIDE, SODIUM LACTATE, POTASSIUM CHLORIDE, CALCIUM CHLORIDE AND DEXTROSE MONOHYDRATE 75 ML/HR: 5; 600; 310; 30; 20 INJECTION, SOLUTION INTRAVENOUS at 00:33

## 2018-12-28 RX ADMIN — BETAMETHASONE SODIUM PHOSPHATE AND BETAMETHASONE ACETATE 12 MG: 3; 3 INJECTION, SUSPENSION INTRA-ARTICULAR; INTRALESIONAL; INTRAMUSCULAR at 17:44

## 2018-12-28 RX ADMIN — SODIUM CHLORIDE 8 MG: 9 INJECTION, SOLUTION INTRAVENOUS at 02:02

## 2018-12-28 RX ADMIN — NIFEDIPINE 20 MG: 10 CAPSULE ORAL at 08:07

## 2018-12-28 RX ADMIN — SODIUM CHLORIDE, SODIUM LACTATE, POTASSIUM CHLORIDE, CALCIUM CHLORIDE AND DEXTROSE MONOHYDRATE 75 ML/HR: 5; 600; 310; 30; 20 INJECTION, SOLUTION INTRAVENOUS at 15:03

## 2018-12-28 RX ADMIN — CALCIUM CARBONATE (ANTACID) CHEW TAB 500 MG 2 TABLET: 500 CHEW TAB at 00:16

## 2018-12-30 PROBLEM — Z87.51 HISTORY OF PRETERM DELIVERY: Status: ACTIVE | Noted: 2018-12-30

## 2018-12-30 PROBLEM — Z3A.31 31 WEEKS GESTATION OF PREGNANCY: Status: ACTIVE | Noted: 2018-12-30

## 2019-01-03 ENCOUNTER — HOSPITAL ENCOUNTER (OUTPATIENT)
Facility: HOSPITAL | Age: 29
Discharge: HOME | End: 2019-01-03
Attending: OBSTETRICS & GYNECOLOGY | Admitting: OBSTETRICS & GYNECOLOGY

## 2019-01-03 ENCOUNTER — APPOINTMENT (OUTPATIENT)
Dept: ULTRASOUND IMAGING | Facility: HOSPITAL | Age: 29
End: 2019-01-03

## 2019-01-03 ENCOUNTER — ROUTINE PRENATAL (OUTPATIENT)
Dept: OBSTETRICS AND GYNECOLOGY | Facility: CLINIC | Age: 29
End: 2019-01-03

## 2019-01-03 VITALS
TEMPERATURE: 98.1 F | HEART RATE: 98 BPM | SYSTOLIC BLOOD PRESSURE: 132 MMHG | OXYGEN SATURATION: 99 % | DIASTOLIC BLOOD PRESSURE: 74 MMHG

## 2019-01-03 VITALS — DIASTOLIC BLOOD PRESSURE: 68 MMHG | BODY MASS INDEX: 25 KG/M2 | WEIGHT: 143.4 LBS | SYSTOLIC BLOOD PRESSURE: 110 MMHG

## 2019-01-03 DIAGNOSIS — O09.213 HIGH RISK PREGNANCY DUE TO HISTORY OF PRETERM LABOR, THIRD TRIMESTER: ICD-10-CM

## 2019-01-03 DIAGNOSIS — Z3A.33 33 WEEKS GESTATION OF PREGNANCY: ICD-10-CM

## 2019-01-03 DIAGNOSIS — O47.03 PRETERM UTERINE CONTRACTIONS, ANTEPARTUM, THIRD TRIMESTER: Primary | ICD-10-CM

## 2019-01-03 DIAGNOSIS — O36.5931 IUGR (INTRAUTERINE GROWTH RESTRICTION) AFFECTING CARE OF MOTHER, THIRD TRIMESTER, FETUS 1: ICD-10-CM

## 2019-01-03 LAB
BLOODY SPECIMEN?: NO
FIBRONECTIN FETAL VAG QL: NEGATIVE

## 2019-01-03 PROCEDURE — 99213 OFFICE O/P EST LOW 20 MIN: CPT | Performed by: OBSTETRICS & GYNECOLOGY

## 2019-01-03 PROCEDURE — 76817 TRANSVAGINAL US OBSTETRIC: CPT

## 2019-01-03 PROCEDURE — 82731 ASSAY OF FETAL FIBRONECTIN: CPT | Performed by: OBSTETRICS & GYNECOLOGY

## 2019-01-03 PROCEDURE — G0463 HOSPITAL OUTPT CLINIC VISIT: HCPCS

## 2019-01-03 PROCEDURE — 76819 FETAL BIOPHYS PROFIL W/O NST: CPT

## 2019-01-03 PROCEDURE — 76820 UMBILICAL ARTERY ECHO: CPT

## 2019-01-03 PROCEDURE — 59025 FETAL NON-STRESS TEST: CPT

## 2019-01-03 PROCEDURE — 96372 THER/PROPH/DIAG INJ SC/IM: CPT | Performed by: OBSTETRICS & GYNECOLOGY

## 2019-01-03 RX ORDER — ZOLPIDEM TARTRATE 5 MG/1
5 TABLET ORAL NIGHTLY PRN
Status: CANCELLED | OUTPATIENT
Start: 2019-01-03 | End: 2019-01-13

## 2019-01-03 NOTE — NON STRESS TEST
Sebastian Toledoky García, a  at 33w0d with an ALYSSA of 2019, by Patient Reported, was seen at [facility] LABOR DELIVERY for a nonstress test.    Chief Complaint   Patient presents with   • Abdominal Cramping       Patient Active Problem List   Diagnosis   • Normal labor   •  labor in second trimester with  delivery in third trimester, fetus 1   • Positive fetal fibronectin at 22 weeks to 34 weeks gestation   • History of  delivery   • 31 weeks gestation of pregnancy       Start Time:   Stop Time:     Interpretation A  Nonstress Test Interpretation A: Reactive (19 : Paulina Lewis, RN)  Comments A: strip reviewed with VICKIE Concepcion RN (19 : Paulina Lewis, RN)

## 2019-01-04 NOTE — DISCHARGE INSTR - ACTIVITY
Return for painful/regular contractions, vaginal bleeding or leaking of fluids, or decreased fetal movement. Keep next follow up appointment. Call with any further questions or concerns.

## 2019-01-04 NOTE — NON STRESS TEST
Manuelmilton Toledoky García, a  at 33w0d with an ALYSSA of 2019, by Patient Reported, was [facility] for a nonstress test.    Chief Complaint   Patient presents with   • Abdominal Cramping       Patient Active Problem List   Diagnosis   • Normal labor   •  labor in second trimester with  delivery in third trimester, fetus 1   • Positive fetal fibronectin at 22 weeks to 34 weeks gestation   • History of  delivery   • 31 weeks gestation of pregnancy       Start Time:   Stop Time:     Interpretation A  Nonstress Test Interpretation A: Reactive (19 : Princess Tinajero, RN)    FFN negative today, BPP 8/8, with NST 10/10, cervical length 3 cm, umbilical flow studies done today. Flagyl called into [facility]. Pt assessed per Dr Johnson prior to D/C. Pt educated on importance of treating BV to help decrease contractions and decrease chance of  labor.

## 2019-01-04 NOTE — PROGRESS NOTES
Incisions she's having some contractions her cervix is fingertip dilated.  She had had a fetal fibronectin that was positive recently.  She said nothing in the vagina for 24 hours we repeated today before examination and sent to labor and delivery    She also has recently diagnosed IUGR and I wanted her to get a Doppler today and a biophysical profile but it hadn't been arranged will have this done in labor and delivery

## 2019-01-05 PROBLEM — Z3A.31 31 WEEKS GESTATION OF PREGNANCY: Status: RESOLVED | Noted: 2018-12-30 | Resolved: 2019-01-05

## 2019-01-05 PROBLEM — Z3A.32 32 WEEKS GESTATION OF PREGNANCY: Status: ACTIVE | Noted: 2019-01-05

## 2019-01-05 PROBLEM — O09.213 HISTORY OF PRETERM LABOR, CURRENT PREGNANCY, THIRD TRIMESTER: Status: ACTIVE | Noted: 2019-01-05

## 2019-01-05 PROBLEM — O47.03 PRETERM UTERINE CONTRACTIONS, ANTEPARTUM, THIRD TRIMESTER: Status: ACTIVE | Noted: 2019-01-05

## 2019-01-06 NOTE — PROGRESS NOTES
Chief complaint ear for prenatal care    #1 history  birth on [place] injection given today    #2 I'm questioning says she's been having some contractions nothing in vagina for 24 hours fetal fibronectin done cervix is fingertip and this is positive will call

## 2019-01-07 ENCOUNTER — ROUTINE PRENATAL (OUTPATIENT)
Dept: OBSTETRICS AND GYNECOLOGY | Facility: CLINIC | Age: 29
End: 2019-01-07

## 2019-01-07 VITALS — DIASTOLIC BLOOD PRESSURE: 68 MMHG | BODY MASS INDEX: 24.65 KG/M2 | WEIGHT: 141.4 LBS | SYSTOLIC BLOOD PRESSURE: 98 MMHG

## 2019-01-07 DIAGNOSIS — O28.8 NST (NON-STRESS TEST) NONREACTIVE: ICD-10-CM

## 2019-01-07 DIAGNOSIS — O36.5930 POOR FETAL GROWTH AFFECTING MANAGEMENT OF MOTHER IN THIRD TRIMESTER, SINGLE OR UNSPECIFIED FETUS: Primary | ICD-10-CM

## 2019-01-07 PROCEDURE — 99213 OFFICE O/P EST LOW 20 MIN: CPT | Performed by: OBSTETRICS & GYNECOLOGY

## 2019-01-07 RX ORDER — METRONIDAZOLE 500 MG/1
1 TABLET ORAL DAILY
Refills: 0 | COMMUNITY
Start: 2019-01-03 | End: 2019-01-14

## 2019-01-10 ENCOUNTER — HOSPITAL ENCOUNTER (OUTPATIENT)
Dept: ULTRASOUND IMAGING | Facility: HOSPITAL | Age: 29
Discharge: HOME | End: 2019-01-10

## 2019-01-10 ENCOUNTER — HOSPITAL ENCOUNTER (OUTPATIENT)
Dept: ULTRASOUND IMAGING | Facility: HOSPITAL | Age: 29
Discharge: HOME | End: 2019-01-10
Admitting: OBSTETRICS & GYNECOLOGY

## 2019-01-10 ENCOUNTER — ROUTINE PRENATAL (OUTPATIENT)
Dept: OBSTETRICS AND GYNECOLOGY | Facility: CLINIC | Age: 29
End: 2019-01-10

## 2019-01-10 VITALS — WEIGHT: 146.6 LBS | DIASTOLIC BLOOD PRESSURE: 68 MMHG | SYSTOLIC BLOOD PRESSURE: 104 MMHG | BODY MASS INDEX: 25.56 KG/M2

## 2019-01-10 DIAGNOSIS — O36.5930 POOR FETAL GROWTH AFFECTING MANAGEMENT OF MOTHER IN THIRD TRIMESTER, SINGLE OR UNSPECIFIED FETUS: ICD-10-CM

## 2019-01-10 DIAGNOSIS — Z3A.34 34 WEEKS GESTATION OF PREGNANCY: Primary | ICD-10-CM

## 2019-01-10 DIAGNOSIS — O36.5910: ICD-10-CM

## 2019-01-10 PROCEDURE — 76820 UMBILICAL ARTERY ECHO: CPT

## 2019-01-10 PROCEDURE — 99213 OFFICE O/P EST LOW 20 MIN: CPT | Performed by: OBSTETRICS & GYNECOLOGY

## 2019-01-10 PROCEDURE — 96372 THER/PROPH/DIAG INJ SC/IM: CPT | Performed by: OBSTETRICS & GYNECOLOGY

## 2019-01-10 PROCEDURE — 76819 FETAL BIOPHYS PROFIL W/O NST: CPT

## 2019-01-10 RX ADMIN — HYDROXYPROGESTERONE CAPROATE 250 MG: 250 INJECTION INTRAMUSCULAR at 16:19

## 2019-01-13 NOTE — PROGRESS NOTES
Chief complaint here for prenatal care #1 history of pretty term birth on [place] injection given today    #2 IUGR fetal well-being testing done today

## 2019-01-14 ENCOUNTER — HOSPITAL ENCOUNTER (OUTPATIENT)
Facility: HOSPITAL | Age: 29
Setting detail: OBSERVATION
Discharge: HOME | End: 2019-01-15
Attending: OBSTETRICS & GYNECOLOGY | Admitting: OBSTETRICS & GYNECOLOGY

## 2019-01-14 ENCOUNTER — ROUTINE PRENATAL (OUTPATIENT)
Dept: OBSTETRICS AND GYNECOLOGY | Facility: CLINIC | Age: 29
End: 2019-01-14

## 2019-01-14 VITALS — WEIGHT: 144.2 LBS | BODY MASS INDEX: 25.14 KG/M2 | DIASTOLIC BLOOD PRESSURE: 60 MMHG | SYSTOLIC BLOOD PRESSURE: 110 MMHG

## 2019-01-14 DIAGNOSIS — O36.5930 POOR FETAL GROWTH AFFECTING MANAGEMENT OF MOTHER IN THIRD TRIMESTER, SINGLE OR UNSPECIFIED FETUS: ICD-10-CM

## 2019-01-14 DIAGNOSIS — O47.00 PRETERM CONTRACTIONS: Primary | ICD-10-CM

## 2019-01-14 DIAGNOSIS — Z3A.34 34 WEEKS GESTATION OF PREGNANCY: ICD-10-CM

## 2019-01-14 DIAGNOSIS — O28.8 NST (NON-STRESS TEST) NONREACTIVE: ICD-10-CM

## 2019-01-14 PROBLEM — O36.5990 IUGR (INTRAUTERINE GROWTH RESTRICTION) AFFECTING CARE OF MOTHER: Status: ACTIVE | Noted: 2019-01-14

## 2019-01-14 LAB
ABO GROUP BLD: NORMAL
ALBUMIN SERPL-MCNC: 3.4 G/DL (ref 3.4–4.8)
ALBUMIN/GLOB SERPL: 1.3 G/DL (ref 1.1–1.8)
ALP SERPL-CCNC: 125 U/L (ref 38–126)
ALT SERPL W P-5'-P-CCNC: 12 U/L (ref 9–52)
AMPHET+METHAMPHET UR QL: NEGATIVE
ANION GAP SERPL CALCULATED.3IONS-SCNC: 4 MMOL/L (ref 5–15)
AST SERPL-CCNC: 10 U/L (ref 14–36)
BARBITURATES UR QL SCN: NEGATIVE
BENZODIAZ UR QL SCN: NEGATIVE
BILIRUB SERPL-MCNC: 0.3 MG/DL (ref 0.2–1.3)
BLD GP AB SCN SERPL QL: NEGATIVE
BLOODY SPECIMEN?: NO
BUN BLD-MCNC: 5 MG/DL (ref 7–21)
BUN/CREAT SERPL: 10.4 (ref 7–25)
CALCIUM SPEC-SCNC: 8.5 MG/DL (ref 8.4–10.2)
CANNABINOIDS SERPL QL: NEGATIVE
CHLORIDE SERPL-SCNC: 103 MMOL/L (ref 95–110)
CO2 SERPL-SCNC: 23 MMOL/L (ref 22–31)
COCAINE UR QL: NEGATIVE
CREAT BLD-MCNC: 0.48 MG/DL (ref 0.5–1)
DEPRECATED RDW RBC AUTO: 43.3 FL (ref 36.4–46.3)
ERYTHROCYTE [DISTWIDTH] IN BLOOD BY AUTOMATED COUNT: 13.6 % (ref 11.5–14.5)
FIBRONECTIN FETAL VAG QL: POSITIVE
GFR SERPL CREATININE-BSD FRML MDRD: 156 ML/MIN/1.73 (ref 71–165)
GLOBULIN UR ELPH-MCNC: 2.6 GM/DL (ref 2.3–3.5)
GLUCOSE BLD-MCNC: 85 MG/DL (ref 60–100)
HCT VFR BLD AUTO: 34.4 % (ref 35–45)
HGB BLD-MCNC: 11.8 G/DL (ref 12–15.5)
HOLD SPECIMEN: NORMAL
LDH SERPL-CCNC: 274 U/L (ref 313–618)
Lab: NORMAL
MCH RBC QN AUTO: 29.6 PG (ref 26.5–34)
MCHC RBC AUTO-ENTMCNC: 34.3 G/DL (ref 31.4–36)
MCV RBC AUTO: 86.4 FL (ref 80–98)
METHADONE UR QL SCN: NEGATIVE
OPIATES UR QL: NEGATIVE
OXYCODONE UR QL SCN: NEGATIVE
PLATELET # BLD AUTO: 326 10*3/MM3 (ref 150–450)
PMV BLD AUTO: 10.1 FL (ref 8–12)
POTASSIUM BLD-SCNC: 3.7 MMOL/L (ref 3.5–5.1)
PROT SERPL-MCNC: 6 G/DL (ref 6.3–8.6)
RBC # BLD AUTO: 3.98 10*6/MM3 (ref 3.77–5.16)
RH BLD: POSITIVE
SODIUM BLD-SCNC: 130 MMOL/L (ref 137–145)
T&S EXPIRATION DATE: NORMAL
URATE SERPL-MCNC: 2.9 MG/DL (ref 2.5–8.5)
WBC NRBC COR # BLD: 21.97 10*3/MM3 (ref 3.2–9.8)

## 2019-01-14 PROCEDURE — G0378 HOSPITAL OBSERVATION PER HR: HCPCS

## 2019-01-14 PROCEDURE — 59025 FETAL NON-STRESS TEST: CPT | Performed by: OBSTETRICS & GYNECOLOGY

## 2019-01-14 PROCEDURE — 80053 COMPREHEN METABOLIC PANEL: CPT | Performed by: OBSTETRICS & GYNECOLOGY

## 2019-01-14 PROCEDURE — 99213 OFFICE O/P EST LOW 20 MIN: CPT | Performed by: OBSTETRICS & GYNECOLOGY

## 2019-01-14 PROCEDURE — 83615 LACTATE (LD) (LDH) ENZYME: CPT | Performed by: OBSTETRICS & GYNECOLOGY

## 2019-01-14 PROCEDURE — 80307 DRUG TEST PRSMV CHEM ANLYZR: CPT | Performed by: OBSTETRICS & GYNECOLOGY

## 2019-01-14 PROCEDURE — 85027 COMPLETE CBC AUTOMATED: CPT | Performed by: OBSTETRICS & GYNECOLOGY

## 2019-01-14 PROCEDURE — 86900 BLOOD TYPING SEROLOGIC ABO: CPT | Performed by: OBSTETRICS & GYNECOLOGY

## 2019-01-14 PROCEDURE — 86901 BLOOD TYPING SEROLOGIC RH(D): CPT | Performed by: OBSTETRICS & GYNECOLOGY

## 2019-01-14 PROCEDURE — 86850 RBC ANTIBODY SCREEN: CPT | Performed by: OBSTETRICS & GYNECOLOGY

## 2019-01-14 PROCEDURE — 82731 ASSAY OF FETAL FIBRONECTIN: CPT | Performed by: OBSTETRICS & GYNECOLOGY

## 2019-01-14 PROCEDURE — 84550 ASSAY OF BLOOD/URIC ACID: CPT | Performed by: OBSTETRICS & GYNECOLOGY

## 2019-01-14 PROCEDURE — 36415 COLL VENOUS BLD VENIPUNCTURE: CPT | Performed by: OBSTETRICS & GYNECOLOGY

## 2019-01-14 PROCEDURE — 87653 STREP B DNA AMP PROBE: CPT | Performed by: OBSTETRICS & GYNECOLOGY

## 2019-01-14 PROCEDURE — 96361 HYDRATE IV INFUSION ADD-ON: CPT

## 2019-01-14 RX ORDER — SODIUM CHLORIDE 0.9 % (FLUSH) 0.9 %
3-10 SYRINGE (ML) INJECTION AS NEEDED
Status: DISCONTINUED | OUTPATIENT
Start: 2019-01-14 | End: 2019-01-14

## 2019-01-14 RX ORDER — SODIUM CHLORIDE 0.9 % (FLUSH) 0.9 %
3 SYRINGE (ML) INJECTION EVERY 12 HOURS SCHEDULED
Status: DISCONTINUED | OUTPATIENT
Start: 2019-01-14 | End: 2019-01-14

## 2019-01-14 RX ORDER — SODIUM CHLORIDE, SODIUM LACTATE, POTASSIUM CHLORIDE, CALCIUM CHLORIDE 600; 310; 30; 20 MG/100ML; MG/100ML; MG/100ML; MG/100ML
125 INJECTION, SOLUTION INTRAVENOUS CONTINUOUS
Status: DISCONTINUED | OUTPATIENT
Start: 2019-01-14 | End: 2019-01-15 | Stop reason: HOSPADM

## 2019-01-14 RX ORDER — ZOLPIDEM TARTRATE 5 MG/1
5 TABLET ORAL NIGHTLY PRN
Status: DISCONTINUED | OUTPATIENT
Start: 2019-01-14 | End: 2019-01-14

## 2019-01-14 RX ORDER — LIDOCAINE HYDROCHLORIDE 10 MG/ML
5 INJECTION, SOLUTION EPIDURAL; INFILTRATION; INTRACAUDAL; PERINEURAL AS NEEDED
Status: DISCONTINUED | OUTPATIENT
Start: 2019-01-14 | End: 2019-01-14

## 2019-01-14 RX ORDER — SODIUM CHLORIDE, SODIUM LACTATE, POTASSIUM CHLORIDE, CALCIUM CHLORIDE 600; 310; 30; 20 MG/100ML; MG/100ML; MG/100ML; MG/100ML
INJECTION, SOLUTION INTRAVENOUS
Status: COMPLETED
Start: 2019-01-14 | End: 2019-01-14

## 2019-01-14 RX ADMIN — SODIUM CHLORIDE, POTASSIUM CHLORIDE, SODIUM LACTATE AND CALCIUM CHLORIDE 1000 ML: 600; 310; 30; 20 INJECTION, SOLUTION INTRAVENOUS at 18:05

## 2019-01-14 RX ADMIN — SODIUM CHLORIDE, SODIUM LACTATE, POTASSIUM CHLORIDE, CALCIUM CHLORIDE 125 ML/HR: 600; 310; 30; 20 INJECTION, SOLUTION INTRAVENOUS at 18:56

## 2019-01-14 RX ADMIN — SODIUM CHLORIDE, POTASSIUM CHLORIDE, SODIUM LACTATE AND CALCIUM CHLORIDE 125 ML/HR: 600; 310; 30; 20 INJECTION, SOLUTION INTRAVENOUS at 18:56

## 2019-01-14 NOTE — PROGRESS NOTES
Chief complaint here for prenatal care    #1 history of  birth on [place] #2 IUGR biophysical physical profile with Dopplers performed today

## 2019-01-15 ENCOUNTER — APPOINTMENT (OUTPATIENT)
Dept: ULTRASOUND IMAGING | Facility: HOSPITAL | Age: 29
End: 2019-01-15

## 2019-01-15 VITALS
HEART RATE: 78 BPM | SYSTOLIC BLOOD PRESSURE: 81 MMHG | OXYGEN SATURATION: 92 % | RESPIRATION RATE: 18 BRPM | WEIGHT: 144 LBS | TEMPERATURE: 98.2 F | BODY MASS INDEX: 25.11 KG/M2 | DIASTOLIC BLOOD PRESSURE: 50 MMHG

## 2019-01-15 PROCEDURE — 96361 HYDRATE IV INFUSION ADD-ON: CPT

## 2019-01-15 PROCEDURE — 59025 FETAL NON-STRESS TEST: CPT | Performed by: OBSTETRICS & GYNECOLOGY

## 2019-01-15 PROCEDURE — 76819 FETAL BIOPHYS PROFIL W/O NST: CPT

## 2019-01-15 PROCEDURE — 59025 FETAL NON-STRESS TEST: CPT

## 2019-01-15 PROCEDURE — 99235 HOSP IP/OBS SAME DATE MOD 70: CPT | Performed by: OBSTETRICS & GYNECOLOGY

## 2019-01-15 PROCEDURE — 96374 THER/PROPH/DIAG INJ IV PUSH: CPT

## 2019-01-15 PROCEDURE — G0378 HOSPITAL OBSERVATION PER HR: HCPCS

## 2019-01-15 PROCEDURE — 25010000002 PROMETHAZINE PER 50 MG

## 2019-01-15 PROCEDURE — G0463 HOSPITAL OUTPT CLINIC VISIT: HCPCS

## 2019-01-15 PROCEDURE — 25010000002 BUTORPHANOL PER 1 MG: Performed by: OBSTETRICS & GYNECOLOGY

## 2019-01-15 PROCEDURE — 96375 TX/PRO/DX INJ NEW DRUG ADDON: CPT

## 2019-01-15 RX ORDER — BUTORPHANOL TARTRATE 1 MG/ML
1 INJECTION, SOLUTION INTRAMUSCULAR; INTRAVENOUS ONCE
Status: COMPLETED | OUTPATIENT
Start: 2019-01-15 | End: 2019-01-15

## 2019-01-15 RX ORDER — PROMETHAZINE HYDROCHLORIDE 25 MG/ML
INJECTION, SOLUTION INTRAMUSCULAR; INTRAVENOUS
Status: COMPLETED
Start: 2019-01-15 | End: 2019-01-15

## 2019-01-15 RX ORDER — PROMETHAZINE HYDROCHLORIDE 25 MG/ML
12.5 INJECTION, SOLUTION INTRAMUSCULAR; INTRAVENOUS ONCE
Status: COMPLETED | OUTPATIENT
Start: 2019-01-15 | End: 2019-01-15

## 2019-01-15 RX ORDER — SODIUM CHLORIDE, SODIUM LACTATE, POTASSIUM CHLORIDE, CALCIUM CHLORIDE 600; 310; 30; 20 MG/100ML; MG/100ML; MG/100ML; MG/100ML
INJECTION, SOLUTION INTRAVENOUS
Status: COMPLETED
Start: 2019-01-15 | End: 2019-01-15

## 2019-01-15 RX ADMIN — SODIUM CHLORIDE, SODIUM LACTATE, POTASSIUM CHLORIDE, CALCIUM CHLORIDE 125 ML/HR: 600; 310; 30; 20 INJECTION, SOLUTION INTRAVENOUS at 01:52

## 2019-01-15 RX ADMIN — BUTORPHANOL TARTRATE 1 MG: 1 INJECTION, SOLUTION INTRAMUSCULAR; INTRAVENOUS at 04:47

## 2019-01-15 RX ADMIN — SODIUM CHLORIDE, POTASSIUM CHLORIDE, SODIUM LACTATE AND CALCIUM CHLORIDE 125 ML/HR: 600; 310; 30; 20 INJECTION, SOLUTION INTRAVENOUS at 01:52

## 2019-01-15 RX ADMIN — PROMETHAZINE HYDROCHLORIDE 12.5 MG: 25 INJECTION, SOLUTION INTRAMUSCULAR; INTRAVENOUS at 04:47

## 2019-01-15 RX ADMIN — PROMETHAZINE HYDROCHLORIDE 12.5 MG: 25 INJECTION INTRAMUSCULAR; INTRAVENOUS at 04:47

## 2019-01-15 NOTE — H&P
[facility]  Obstetric History and Physical    Chief Complaint   Patient presents with   • Abnormal Imaging     BPP /, sent from office for monitoring      contractions; history  birth      Patient is a 28 y.o. female  currently at 34w5d, who presents with having been seen in the office today noted to be having an nonstress test regular uterine contractions her cervix was closed.  Even though she was 34-5/7 and went ahead and did a fetal fibronectin that turned out to be positive she's changed her cervix to about 2 cm meters but hasn't made further cervical change.  Given she is over 34 weeks will not plan   tortocolysis.  She has had steroids a couple weeks ago.  We've been following her for in her uterine growth restriction so that's why we had done a NST today.      Her prenatal care is complicated by  abnormal fetal growth  IUGR.       Obstetric History   #: 1, Date: , Sex: None, Weight: None, GA: None, Delivery: Spontaneous , Apgar1: None, Apgar5: None, Living: None, Birth Comments: None    #: 2, Date: , Sex: None, Weight: None, GA: None, Delivery: Vaginal, Spontaneous, Apgar1: None, Apgar5: None, Living: Living, Birth Comments: None    #: 3, Date: , Sex: None, Weight: None, GA: None, Delivery: Spontaneous , Apgar1: None, Apgar5: None, Living: None, Birth Comments: None    #: 4, Date: None, Sex: None, Weight: None, GA: None, Delivery: None, Apgar1: None, Apgar5: None, Living: None, Birth Comments: None       The following portions of the patients history were reviewed and updated as appropriate: current medications, allergies, past medical history, past surgical history, past family history, past social history and problem list .       Prenatal Information:  Prenatal Results     Initial Prenatal Labs     Test Value Reference Range Date Time    Hemoglobin        Hematocrit        Platelets 326 10*3/mm3 150 - 450 10*3/mm3 19 1842    Rubella IgG         Hepatitis B SAg        Hepatitis C Ab        RPR        ABO A   01/14/19 1842    Rh Positive   01/14/19 1842    Antibody Screen        HIV        Urine Culture Mixed Keila Isolated   11/19/18 2236    Gonorrhea Negative  Negative 12/06/18 1144    Chlamydia Negative  Negative 12/06/18 1144    TSH              2nd and 3rd Trimester     Test Value Reference Range Date Time    Hemoglobin (repeated) 11.8 g/dL 12.0 - 15.5 g/dL 01/14/19 1842    Hematocrit (repeated) 34.4 % 35.0 - 45.0 % 01/14/19 1842     mg/dL 60 - 140 mg/dL 12/27/18 1146    Antibody Screen (repeated) Negative   01/14/19 1842    GTT Fasting        GTT 1 Hr        GTT 2 Hr        GTT 3 Hr        Group B Strep              Drug Screening     Test Value Reference Range Date Time    Amphetamine Screen Negative  Negative 01/14/19 1807    Barbiturate Screen Negative  Negative 01/14/19 1807    Benzodiazepine Screen Negative  Negative 01/14/19 1807    Methadone Screen Negative  Negative 01/14/19 1807    Phencyclidine Screen        Opiates Screen Negative  Negative 01/14/19 1807    THC Screen Negative  Negative 01/14/19 1807    Cocaine Screen Negative  Negative 01/14/19 1807    Propoxyphene Screen        Buprenorphine Screen        Methamphetamine Screen        Oxycodone Screen Negative  Negative 01/14/19 1807    Tricyclic Antidepressants Screen              Other (Risk screening)     Test Value Reference Range Date Time    Varicella IgG        Parvovirus IgG        CMV IgG        Cystic Fibrosis        Hemoglobin electrophoresis        NIPT        MSAFP-4        AFP (for NTD only)                  External Prenatal Results     Pregnancy Outside Results - Transcribed From Office Records - See Scanned Records For Details     Test Value Date Time    Hgb 11.8 g/dL 01/14/19 1842    Hct 34.4 % 01/14/19 1842    ABO A  01/14/19 1842    Rh Positive  01/14/19 1842    Antibody Screen Negative  01/14/19 1842    Glucose Fasting GTT       Glucose Tolerance Test 1 hour        Glucose Tolerance Test 3 hour       Gonorrhea (discrete) Negative  18 1144    Chlamydia (discrete) Negative  18 1144    RPR       VDRL       Syphilis Antibody       Rubella       HBsAg       Herpes Simplex Virus PCR       Herpes Simplex VIrus Culture       HIV       Hep C RNA Quant PCR       Hep C Antibody       AFP       Group B Strep       GBS Susceptibility to Clindamycin       GBS Susceptibility to Erythromycin       Fetal Fibronectin Positive  19 1537    Genetic Testing, Maternal Blood             Drug Screening     Test Value Date Time    Urine Drug Screen       Amphetamine Screen Negative  19 1807    Barbiturate Screen Negative  19 1807    Benzodiazepine Screen Negative  19 1807    Methadone Screen Negative  19 1807    Phencyclidine Screen       Opiates Screen Negative  19 1807    THC Screen Negative  19 180    Cocaine Screen       Propoxyphene Screen       Buprenorphine Screen       Methamphetamine Screen       Oxycodone Screen Negative  19 1807    Tricyclic Antidepressants Screen                    Past OB History:     Obstetric History       T0      L1     SAB2   TAB0   Ectopic0   Molar0   Multiple0   Live Births1       # Outcome Date GA Lbr Vern/2nd Weight Sex Delivery Anes PTL Lv   4 Current            3 2018     SAB      2  2015     Vag-Spont   ISAIAH   1 2009     SAB              ALLERGIES:   No Known Allergies     Home Medications:     Prior to Admission medications    Medication Sig Start Date End Date Taking? Authorizing Provider   albuterol sulfate HFA (VENTOLIN HFA) 108 (90 Base) MCG/ACT inhaler Inhale 2 puffs Every 6 (Six) Hours As Needed. 12/10/18   Provider, MD Arturo       Past Medical History: Past Medical History:   Diagnosis Date   • Abnormal Pap smear of cervix    • Anxiety    • Asthma    • Cervical dysplasia    • Cyst of ovary    • Palpitations    • Tobacco dependence syndrome    • URI (upper  respiratory infection)       Past Surgical History Past Surgical History:   Procedure Laterality Date   • CERVICAL BIOPSY  W/ LOOP ELECTRODE EXCISION     • ENDOSCOPY  01/27/2012    Normal esophagus. Gastritis in stomach. Biopsy taken. Normal duodenum. Biopsy taken.   • ENDOSCOPY AND COLONOSCOPY  01/27/2012    Colitis in rectum. Biopsy taken.      Family History: Family History   Problem Relation Age of Onset   • Colon cancer Father    • Coronary artery disease Father    • Hypertension Father    • Kidney disease Father    • Thyroid disease Sister    • Coronary artery disease Paternal Grandfather    • Coronary artery disease Maternal Grandmother    • Coronary artery disease Maternal Grandfather       Social History:  reports that she has been smoking cigarettes.  She has been smoking about 1.00 pack per day. she has never used smokeless tobacco.   reports that she does not drink alcohol.   reports that she does not use drugs.      ROS:                                                                                                                                  Neuro no history of brain tumor    HENT no history of ear tumors    Eye no history of retinal tumors    Pulmonary no history of lung tumors    Cardiac no history of cardiac tumors    GI: No history of small bowel tumors    Musculoskeletal: No history of skeletal muscle tumors    Endocrine: No history of adrenal tumors    Lymphatic: No history of Hodgkin's disease    Renal: No history of renal cancer      Objective       Vital Signs Range for the last 24 hours  Temperature: Temp:  [98.5 °F (36.9 °C)] 98.5 °F (36.9 °C)   Temp Source: Temp src: Oral   BP: BP: (110-117)/(60-73) 117/73   Pulse: Heart Rate:  [105] 105   Respirations:     SPO2: SpO2:  [98 %] 98 %   O2 Amount (l/min):     O2 Devices     Weight: Weight:  [65.3 kg (144 lb)-65.4 kg (144 lb 3.2 oz)] 65.3 kg (144 lb)       PHYSICAL EXANIMATIONConstitutional: Appears to be in no acute distress;Eyes sclera  normal; endocrine system thyroid palpates is normal; pulmonary system lungs clear; cardiovascular system heart regular rate and rhythm;gastrointestinal system abdomen soft nontender active bowel sound;urologic system CVA negative;psychiatric appropriate insight;neurologic gait within normal limits       OBGyn Exam    Presentation: Vertex    Cervix: Exam by: Method: sterile exam per RN   Dilation:  2-3   Effacement: Cervical Effacement: 70-80%   Station: -1        Assessment/Plan       IUGR (intrauterine growth restriction) affecting care of mother     contractions      Assessment:  1. Intrauterine pregnancy at 34-5/7.    GBS status:   Done today plan for prophylaxis in  labor    Plan:  1. Admit to labor and delivery.  2. Plan of care has been reviewed with staff, patient, and  family  3. Risks, benefits of treatment plan have been discussed.  4. All questions have been answered.  5. Will plan to observe for labor will not plan for tocolysis  6 not in labor we'll plan for a bit repeat of fetal well-being testing        This document has been electronically signed by Harish Johnson MD on January 15, 2019 12:14 AM

## 2019-01-15 NOTE — DISCHARGE INSTR - ACTIVITY
Keep next office appointment. Call or return for stronger contractions,leaking or breaking of water, vag bleeding  Or decreased fetal movement. Fetal movement count instruction sheet given.

## 2019-01-15 NOTE — NON STRESS TEST
Sebastian Naiks, a  at 34w5d with an ALYSSA of 2019, by Patient Reported, was seen at [facility] LABOR DELIVERY for a nonstress test.    Chief Complaint   Patient presents with   • Abnormal Imaging     BPP , sent from office for monitoring       Patient Active Problem List   Diagnosis   • Normal labor   •  labor in second trimester with  delivery in third trimester, fetus 1   • Positive fetal fibronectin at 22 weeks to 34 weeks gestation   • History of  delivery   • History of  labor, current pregnancy, third trimester   •  uterine contractions, antepartum, third trimester   • 32 weeks gestation of pregnancy   • IUGR (intrauterine growth restriction) affecting care of mother   •  contractions       Start Time: 0730  Stop Time: 0800     Biophysical profile  with NST 10/10,cx3.85,pt educated on s/s of  labor.

## 2019-01-16 LAB — GROUP B STREP, DNA: NEGATIVE

## 2019-01-17 ENCOUNTER — ROUTINE PRENATAL (OUTPATIENT)
Dept: OBSTETRICS AND GYNECOLOGY | Facility: CLINIC | Age: 29
End: 2019-01-17

## 2019-01-17 VITALS — BODY MASS INDEX: 25.67 KG/M2 | WEIGHT: 147.2 LBS | DIASTOLIC BLOOD PRESSURE: 68 MMHG | SYSTOLIC BLOOD PRESSURE: 100 MMHG

## 2019-01-17 DIAGNOSIS — O28.8 NST (NON-STRESS TEST) NONREACTIVE: ICD-10-CM

## 2019-01-17 DIAGNOSIS — O36.5930 POOR FETAL GROWTH AFFECTING MANAGEMENT OF MOTHER IN THIRD TRIMESTER, SINGLE OR UNSPECIFIED FETUS: Primary | ICD-10-CM

## 2019-01-17 PROCEDURE — 99213 OFFICE O/P EST LOW 20 MIN: CPT | Performed by: OBSTETRICS & GYNECOLOGY

## 2019-01-17 NOTE — DISCHARGE SUMMARY
[facility]  Sebastian Alas  : 1990  MRN: [id number]  CSN: [id number]    Discharge Summary:    Date of Admission: 2019  Date of Discharge:  1/15/2019    Admitting Diagnosis:  1. IUP @ 35w0d  2.   contractions in third trimester    #3 intrauterine growth restriction     Discharge Diagnosis:   Same       History and Hospital Course:  Patient was seen in the office at with history of  delivery on [place] also being followed by intrauterine growth restriction.  Nonstress test showed regular uterine contractions she was sent to labor and delivery she did make cervical change from closed to 2 cm but did not make further cervical change after analgesia.  Biophysical profile in the office had been 6 of 10 repeat was 10 of 10.  The following day she was anxious for discharge felt she was stable for discharge with strict kick counts to return for regular uterine contractions or other problems  P Discharge Disposition:home  Discharge Diet:  regular  Activity at Discharge:   Activity Instructions     Keep next office appointment. Call or return for stronger contractions,leaking or breaking of water, vag bleeding  Or decreased fetal movement. Fetal movement count instruction sheet given.               Discharge Medications:       Discharge Medications      ASK your doctor about these medications      Instructions Start Date   VENTOLIN  (90 Base) MCG/ACT inhaler  Generic drug:  albuterol sulfate HFA   2 puffs, Inhalation, Every 6 Hours PRN           Patient will restart all home medications including prenatal vitamins.  Condition stable    Activity limitations pelvic rest        This document has been electronically signed by Harish Johnson MD on 2019 12:23 AM

## 2019-01-19 PROBLEM — Z3A.34 34 WEEKS GESTATION OF PREGNANCY: Status: ACTIVE | Noted: 2019-01-19

## 2019-01-19 NOTE — PROGRESS NOTES
Chief complaint here for prenatal care    #1 history of  birth on [place] says she is having contractions today and on the nonstress test she has    #2 IUGR says baby is active    #3 on NST having regular contractions that palpate is moderate and was sent to labor and delivery

## 2019-01-21 ENCOUNTER — ROUTINE PRENATAL (OUTPATIENT)
Dept: OBSTETRICS AND GYNECOLOGY | Facility: CLINIC | Age: 29
End: 2019-01-21

## 2019-01-21 VITALS — SYSTOLIC BLOOD PRESSURE: 116 MMHG | BODY MASS INDEX: 25.42 KG/M2 | DIASTOLIC BLOOD PRESSURE: 72 MMHG | WEIGHT: 145.8 LBS

## 2019-01-21 DIAGNOSIS — O36.5910 POOR FETAL GROWTH AFFECTING MANAGEMENT OF MOTHER IN FIRST TRIMESTER, SINGLE OR UNSPECIFIED FETUS: ICD-10-CM

## 2019-01-21 DIAGNOSIS — Z3A.35 35 WEEKS GESTATION OF PREGNANCY: Primary | ICD-10-CM

## 2019-01-21 DIAGNOSIS — Z87.51 HISTORY OF PRETERM DELIVERY: ICD-10-CM

## 2019-01-21 PROCEDURE — 99213 OFFICE O/P EST LOW 20 MIN: CPT | Performed by: OBSTETRICS & GYNECOLOGY

## 2019-01-21 NOTE — PROGRESS NOTES
Chief complaint here for prenatal care    #1 history of  delivery on [place] but is refusing to take any further    #2 IUGR NST nonreactive biophysical profile reassuring physical profile  today preliminary on Doppler the cords reassuring

## 2019-01-24 ENCOUNTER — ROUTINE PRENATAL (OUTPATIENT)
Dept: OBSTETRICS AND GYNECOLOGY | Facility: CLINIC | Age: 29
End: 2019-01-24

## 2019-01-24 VITALS — BODY MASS INDEX: 25.56 KG/M2 | WEIGHT: 146.6 LBS | DIASTOLIC BLOOD PRESSURE: 72 MMHG | SYSTOLIC BLOOD PRESSURE: 112 MMHG

## 2019-01-24 DIAGNOSIS — O36.5910 POOR FETAL GROWTH AFFECTING MANAGEMENT OF MOTHER IN FIRST TRIMESTER, SINGLE OR UNSPECIFIED FETUS: ICD-10-CM

## 2019-01-24 DIAGNOSIS — Z3A.35 35 WEEKS GESTATION OF PREGNANCY: Primary | ICD-10-CM

## 2019-01-24 PROCEDURE — 99213 OFFICE O/P EST LOW 20 MIN: CPT | Performed by: OBSTETRICS & GYNECOLOGY

## 2019-01-24 PROCEDURE — 59025 FETAL NON-STRESS TEST: CPT | Performed by: OBSTETRICS & GYNECOLOGY

## 2019-01-24 RX ORDER — HYDROCODONE BITARTRATE AND ACETAMINOPHEN 5; 325 MG/1; MG/1
1 TABLET ORAL EVERY 4 HOURS PRN
Refills: 0 | COMMUNITY
Start: 2019-01-22 | End: 2019-02-09 | Stop reason: HOSPADM

## 2019-01-27 PROBLEM — Z3A.35 35 WEEKS GESTATION OF PREGNANCY: Status: ACTIVE | Noted: 2019-01-27

## 2019-01-28 ENCOUNTER — ROUTINE PRENATAL (OUTPATIENT)
Dept: OBSTETRICS AND GYNECOLOGY | Facility: CLINIC | Age: 29
End: 2019-01-28

## 2019-01-28 ENCOUNTER — HOSPITAL ENCOUNTER (OUTPATIENT)
Facility: HOSPITAL | Age: 29
Discharge: HOME | End: 2019-01-28
Attending: OBSTETRICS & GYNECOLOGY | Admitting: OBSTETRICS & GYNECOLOGY

## 2019-01-28 VITALS
SYSTOLIC BLOOD PRESSURE: 116 MMHG | OXYGEN SATURATION: 98 % | TEMPERATURE: 98 F | RESPIRATION RATE: 18 BRPM | DIASTOLIC BLOOD PRESSURE: 72 MMHG | HEART RATE: 104 BPM

## 2019-01-28 VITALS — DIASTOLIC BLOOD PRESSURE: 72 MMHG | BODY MASS INDEX: 25.46 KG/M2 | SYSTOLIC BLOOD PRESSURE: 102 MMHG | WEIGHT: 146 LBS

## 2019-01-28 DIAGNOSIS — O36.5910 POOR FETAL GROWTH AFFECTING MANAGEMENT OF MOTHER IN FIRST TRIMESTER, SINGLE OR UNSPECIFIED FETUS: ICD-10-CM

## 2019-01-28 DIAGNOSIS — Z3A.36 36 WEEKS GESTATION OF PREGNANCY: Primary | ICD-10-CM

## 2019-01-28 PROCEDURE — 99213 OFFICE O/P EST LOW 20 MIN: CPT | Performed by: OBSTETRICS & GYNECOLOGY

## 2019-01-28 PROCEDURE — 59025 FETAL NON-STRESS TEST: CPT

## 2019-01-28 PROCEDURE — G0463 HOSPITAL OUTPT CLINIC VISIT: HCPCS

## 2019-01-28 NOTE — PROGRESS NOTES
Chief complaint here for prenatal care    #1 history of  live delivery refused further [place] despite benefits reviewed    #2 IUGR nonstress test performed today was reactive

## 2019-01-28 NOTE — PROGRESS NOTES
Chief complaint here for prenatal care    #1 history of  delivery refuses further making injections #2 IUGR undergoing fetal well-being testing #3 biophysical profile is

## 2019-01-29 DIAGNOSIS — O36.5910 POOR FETAL GROWTH AFFECTING MANAGEMENT OF MOTHER IN FIRST TRIMESTER, SINGLE OR UNSPECIFIED FETUS: ICD-10-CM

## 2019-01-29 NOTE — NON STRESS TEST
Sebastian Toledoky Alas, a  at 36w5d with an ALYSSA of 2019, by Patient Reported, was seen at [facility] LABOR DELIVERY for a nonstress test.    Chief Complaint   Patient presents with   • Abdominal Cramping     pt c/o vaginal spotting and cramping starting around 1700       Patient Active Problem List   Diagnosis   • Normal labor   •  labor in second trimester with  delivery in third trimester, fetus 1   • Positive fetal fibronectin at 22 weeks to 34 weeks gestation   • History of  delivery   • History of  labor, current pregnancy, third trimester   •  uterine contractions, antepartum, third trimester   • 32 weeks gestation of pregnancy   • IUGR (intrauterine growth restriction) affecting care of mother   •  contractions   • 34 weeks gestation of pregnancy   • 35 weeks gestation of pregnancy       Start Time:   Stop Time:     Interpretation A  Nonstress Test Interpretation A: Reactive (19 : Princess Tinajero, RN)  Comments A: strip reviewed with EMRE Swan RN (19 : Princess Tinajero RN)

## 2019-01-30 NOTE — PROGRESS NOTES
Chief complaint here for prenatal care    #1 history of  delivery at 36 weeks refuses further [place] injection    #2 IUGR and biophysical profile reassuring actually estimated fetal weight is at 15th percentile and abdominal circumference 19th

## 2019-02-01 ENCOUNTER — ROUTINE PRENATAL (OUTPATIENT)
Dept: OBSTETRICS AND GYNECOLOGY | Facility: CLINIC | Age: 29
End: 2019-02-01

## 2019-02-01 VITALS — BODY MASS INDEX: 25.63 KG/M2 | WEIGHT: 147 LBS | SYSTOLIC BLOOD PRESSURE: 100 MMHG | DIASTOLIC BLOOD PRESSURE: 70 MMHG

## 2019-02-01 DIAGNOSIS — O36.5930 POOR FETAL GROWTH AFFECTING MANAGEMENT OF MOTHER IN THIRD TRIMESTER, SINGLE OR UNSPECIFIED FETUS: ICD-10-CM

## 2019-02-01 DIAGNOSIS — Z3A.37 37 WEEKS GESTATION OF PREGNANCY: ICD-10-CM

## 2019-02-01 DIAGNOSIS — O09.893 HISTORY OF PRETERM DELIVERY, CURRENTLY PREGNANT IN THIRD TRIMESTER: Primary | ICD-10-CM

## 2019-02-01 PROBLEM — Z3A.34 34 WEEKS GESTATION OF PREGNANCY: Status: RESOLVED | Noted: 2019-01-19 | Resolved: 2019-02-01

## 2019-02-01 PROBLEM — Z3A.32 32 WEEKS GESTATION OF PREGNANCY: Status: RESOLVED | Noted: 2019-01-05 | Resolved: 2019-02-01

## 2019-02-01 PROBLEM — O09.213 HISTORY OF PRETERM LABOR, CURRENT PREGNANCY, THIRD TRIMESTER: Status: RESOLVED | Noted: 2019-01-05 | Resolved: 2019-02-01

## 2019-02-01 PROBLEM — Z3A.35 35 WEEKS GESTATION OF PREGNANCY: Status: RESOLVED | Noted: 2019-01-27 | Resolved: 2019-02-01

## 2019-02-01 PROCEDURE — 59025 FETAL NON-STRESS TEST: CPT | Performed by: NURSE PRACTITIONER

## 2019-02-01 PROCEDURE — 99213 OFFICE O/P EST LOW 20 MIN: CPT | Performed by: NURSE PRACTITIONER

## 2019-02-01 NOTE — PROGRESS NOTES
CC: NST & OB visit; hx reviewed, no changes    ROS: Reports good FM. Occasional contractions but nothing she can time. Denies LOF, VB or abnormal vaginal d/c.    P/E: See notes. Reactive NST    A/P: 28 y.o.  @ 37w1d here for NST & routine OB care    1. High risk gestation  - Term labor precautions  - Encouraged FKC twice daily  - GBS negative, A POSITIVE, UDS negative  - RTC next week for BPP on  or Tue and NST on  or  then OB appt with Dr. Johnson.    2. Hx of  delivery at 36 weeks  - Arlene inj completed at 36 weeks  - FFN positive on     3. IUGR  - IG on 19 EFW 15%tile  - Continue twice weekly  testing

## 2019-02-04 ENCOUNTER — ROUTINE PRENATAL (OUTPATIENT)
Dept: OBSTETRICS AND GYNECOLOGY | Facility: CLINIC | Age: 29
End: 2019-02-04

## 2019-02-04 VITALS — SYSTOLIC BLOOD PRESSURE: 132 MMHG | WEIGHT: 150 LBS | BODY MASS INDEX: 26.15 KG/M2 | DIASTOLIC BLOOD PRESSURE: 84 MMHG

## 2019-02-04 DIAGNOSIS — O36.5910 POOR FETAL GROWTH AFFECTING MANAGEMENT OF MOTHER IN FIRST TRIMESTER, SINGLE OR UNSPECIFIED FETUS: Primary | ICD-10-CM

## 2019-02-04 DIAGNOSIS — Z3A.37 37 WEEKS GESTATION OF PREGNANCY: ICD-10-CM

## 2019-02-04 PROCEDURE — 99213 OFFICE O/P EST LOW 20 MIN: CPT | Performed by: OBSTETRICS & GYNECOLOGY

## 2019-02-04 RX ORDER — SODIUM CHLORIDE 0.9 % (FLUSH) 0.9 %
3-10 SYRINGE (ML) INJECTION AS NEEDED
Status: CANCELLED | OUTPATIENT
Start: 2019-02-04

## 2019-02-04 RX ORDER — LIDOCAINE HYDROCHLORIDE 10 MG/ML
5 INJECTION, SOLUTION EPIDURAL; INFILTRATION; INTRACAUDAL; PERINEURAL AS NEEDED
Status: CANCELLED | OUTPATIENT
Start: 2019-02-04

## 2019-02-04 RX ORDER — OXYTOCIN/0.9 % SODIUM CHLORIDE 30/500 ML
2-30 PLASTIC BAG, INJECTION (ML) INTRAVENOUS
Status: CANCELLED | OUTPATIENT
Start: 2019-02-04

## 2019-02-04 RX ORDER — SODIUM CHLORIDE 0.9 % (FLUSH) 0.9 %
3 SYRINGE (ML) INJECTION EVERY 12 HOURS SCHEDULED
Status: CANCELLED | OUTPATIENT
Start: 2019-02-04

## 2019-02-04 RX ORDER — ZOLPIDEM TARTRATE 5 MG/1
5 TABLET ORAL NIGHTLY PRN
Status: CANCELLED | OUTPATIENT
Start: 2019-02-04 | End: 2019-02-14

## 2019-02-07 ENCOUNTER — HOSPITAL ENCOUNTER (INPATIENT)
Facility: HOSPITAL | Age: 29
LOS: 2 days | Discharge: HOME | End: 2019-02-09
Attending: OBSTETRICS & GYNECOLOGY | Admitting: OBSTETRICS & GYNECOLOGY

## 2019-02-07 ENCOUNTER — HOSPITAL ENCOUNTER (OUTPATIENT)
Dept: LABOR AND DELIVERY | Facility: HOSPITAL | Age: 29
Discharge: HOME | End: 2019-02-07

## 2019-02-07 ENCOUNTER — ANESTHESIA EVENT (OUTPATIENT)
Dept: OBSTETRICS AND GYNECOLOGY | Facility: CLINIC | Age: 29
End: 2019-02-07

## 2019-02-07 ENCOUNTER — ANESTHESIA (OUTPATIENT)
Dept: OBSTETRICS AND GYNECOLOGY | Facility: CLINIC | Age: 29
End: 2019-02-07

## 2019-02-07 DIAGNOSIS — O36.5910 POOR FETAL GROWTH AFFECTING MANAGEMENT OF MOTHER IN FIRST TRIMESTER, SINGLE OR UNSPECIFIED FETUS: ICD-10-CM

## 2019-02-07 DIAGNOSIS — O28.8 NST (NON-STRESS TEST) NONREACTIVE: ICD-10-CM

## 2019-02-07 PROBLEM — O36.5990 IUGR (INTRAUTERINE GROWTH RESTRICTION) AFFECTING CARE OF MOTHER: Status: ACTIVE | Noted: 2019-02-07

## 2019-02-07 LAB
ABO GROUP BLD: NORMAL
AMPHET+METHAMPHET UR QL: NEGATIVE
BARBITURATES UR QL SCN: NEGATIVE
BENZODIAZ UR QL SCN: NEGATIVE
BLD GP AB SCN SERPL QL: NEGATIVE
CANNABINOIDS SERPL QL: NEGATIVE
COCAINE UR QL: NEGATIVE
DEPRECATED RDW RBC AUTO: 43.8 FL (ref 36.4–46.3)
ERYTHROCYTE [DISTWIDTH] IN BLOOD BY AUTOMATED COUNT: 14 % (ref 11.5–14.5)
HCT VFR BLD AUTO: 35.8 % (ref 35–45)
HGB BLD-MCNC: 12.4 G/DL (ref 12–15.5)
HOLD SPECIMEN: NORMAL
Lab: NORMAL
MCH RBC QN AUTO: 29.7 PG (ref 26.5–34)
MCHC RBC AUTO-ENTMCNC: 34.6 G/DL (ref 31.4–36)
MCV RBC AUTO: 85.9 FL (ref 80–98)
METHADONE UR QL SCN: NEGATIVE
OPIATES UR QL: NEGATIVE
OXYCODONE UR QL SCN: NEGATIVE
PLATELET # BLD AUTO: 359 10*3/MM3 (ref 150–450)
PMV BLD AUTO: 9.9 FL (ref 8–12)
RBC # BLD AUTO: 4.17 10*6/MM3 (ref 3.77–5.16)
RH BLD: POSITIVE
T&S EXPIRATION DATE: NORMAL
WBC NRBC COR # BLD: 23.25 10*3/MM3 (ref 3.2–9.8)

## 2019-02-07 PROCEDURE — 80307 DRUG TEST PRSMV CHEM ANLYZR: CPT | Performed by: OBSTETRICS & GYNECOLOGY

## 2019-02-07 PROCEDURE — 51703 INSERT BLADDER CATH COMPLEX: CPT

## 2019-02-07 PROCEDURE — C1755 CATHETER, INTRASPINAL: HCPCS

## 2019-02-07 PROCEDURE — 86850 RBC ANTIBODY SCREEN: CPT | Performed by: OBSTETRICS & GYNECOLOGY

## 2019-02-07 PROCEDURE — C1755 CATHETER, INTRASPINAL: HCPCS | Performed by: NURSE ANESTHETIST, CERTIFIED REGISTERED

## 2019-02-07 PROCEDURE — 59410 OBSTETRICAL CARE: CPT | Performed by: OBSTETRICS & GYNECOLOGY

## 2019-02-07 PROCEDURE — 3E033VJ INTRODUCTION OF OTHER HORMONE INTO PERIPHERAL VEIN, PERCUTANEOUS APPROACH: ICD-10-PCS | Performed by: OBSTETRICS & GYNECOLOGY

## 2019-02-07 PROCEDURE — 88307 TISSUE EXAM BY PATHOLOGIST: CPT | Performed by: PATHOLOGY

## 2019-02-07 PROCEDURE — 10907ZC DRAINAGE OF AMNIOTIC FLUID, THERAPEUTIC FROM PRODUCTS OF CONCEPTION, VIA NATURAL OR ARTIFICIAL OPENING: ICD-10-PCS | Performed by: OBSTETRICS & GYNECOLOGY

## 2019-02-07 PROCEDURE — 94799 UNLISTED PULMONARY SVC/PX: CPT

## 2019-02-07 PROCEDURE — 85027 COMPLETE CBC AUTOMATED: CPT | Performed by: OBSTETRICS & GYNECOLOGY

## 2019-02-07 PROCEDURE — 86900 BLOOD TYPING SEROLOGIC ABO: CPT | Performed by: OBSTETRICS & GYNECOLOGY

## 2019-02-07 PROCEDURE — 25010000002 FENTANYL CITRATE (PF) 250 MCG/5ML SOLUTION: Performed by: NURSE ANESTHETIST, CERTIFIED REGISTERED

## 2019-02-07 PROCEDURE — 88307 TISSUE EXAM BY PATHOLOGIST: CPT | Performed by: OBSTETRICS & GYNECOLOGY

## 2019-02-07 PROCEDURE — 86901 BLOOD TYPING SEROLOGIC RH(D): CPT | Performed by: OBSTETRICS & GYNECOLOGY

## 2019-02-07 PROCEDURE — 0KQM0ZZ REPAIR PERINEUM MUSCLE, OPEN APPROACH: ICD-10-PCS | Performed by: OBSTETRICS & GYNECOLOGY

## 2019-02-07 RX ORDER — TERBUTALINE SULFATE 1 MG/ML
INJECTION, SOLUTION SUBCUTANEOUS
Status: DISCONTINUED
Start: 2019-02-07 | End: 2019-02-07 | Stop reason: WASHOUT

## 2019-02-07 RX ORDER — LIDOCAINE HYDROCHLORIDE 10 MG/ML
5 INJECTION, SOLUTION EPIDURAL; INFILTRATION; INTRACAUDAL; PERINEURAL AS NEEDED
Status: DISCONTINUED | OUTPATIENT
Start: 2019-02-07 | End: 2019-02-07 | Stop reason: HOSPADM

## 2019-02-07 RX ORDER — IBUPROFEN 600 MG/1
600 TABLET ORAL EVERY 8 HOURS PRN
Status: DISCONTINUED | OUTPATIENT
Start: 2019-02-07 | End: 2019-02-09 | Stop reason: HOSPADM

## 2019-02-07 RX ORDER — SODIUM CHLORIDE, SODIUM LACTATE, POTASSIUM CHLORIDE, CALCIUM CHLORIDE 600; 310; 30; 20 MG/100ML; MG/100ML; MG/100ML; MG/100ML
125 INJECTION, SOLUTION INTRAVENOUS CONTINUOUS
Status: DISCONTINUED | OUTPATIENT
Start: 2019-02-07 | End: 2019-02-09 | Stop reason: HOSPADM

## 2019-02-07 RX ORDER — ONDANSETRON 4 MG/1
4 TABLET, FILM COATED ORAL EVERY 8 HOURS PRN
Status: DISCONTINUED | OUTPATIENT
Start: 2019-02-07 | End: 2019-02-09 | Stop reason: HOSPADM

## 2019-02-07 RX ORDER — SODIUM CHLORIDE, SODIUM LACTATE, POTASSIUM CHLORIDE, CALCIUM CHLORIDE 600; 310; 30; 20 MG/100ML; MG/100ML; MG/100ML; MG/100ML
INJECTION, SOLUTION INTRAVENOUS
Status: COMPLETED
Start: 2019-02-07 | End: 2019-02-07

## 2019-02-07 RX ORDER — BUPIVACAINE HYDROCHLORIDE 2.5 MG/ML
INJECTION, SOLUTION EPIDURAL; INFILTRATION; INTRACAUDAL AS NEEDED
Status: DISCONTINUED | OUTPATIENT
Start: 2019-02-07 | End: 2019-02-07 | Stop reason: SURG

## 2019-02-07 RX ORDER — ACETAMINOPHEN 325 MG/1
650 TABLET ORAL EVERY 4 HOURS PRN
Status: DISCONTINUED | OUTPATIENT
Start: 2019-02-07 | End: 2019-02-09 | Stop reason: HOSPADM

## 2019-02-07 RX ORDER — SODIUM CHLORIDE 0.9 % (FLUSH) 0.9 %
1-10 SYRINGE (ML) INJECTION AS NEEDED
Status: DISCONTINUED | OUTPATIENT
Start: 2019-02-07 | End: 2019-02-09 | Stop reason: HOSPADM

## 2019-02-07 RX ORDER — OXYTOCIN/RINGER'S LACTATE 20/1000 ML
PLASTIC BAG, INJECTION (ML) INTRAVENOUS
Status: DISPENSED
Start: 2019-02-07 | End: 2019-02-08

## 2019-02-07 RX ORDER — ZOLPIDEM TARTRATE 5 MG/1
5 TABLET ORAL NIGHTLY PRN
Status: DISCONTINUED | OUTPATIENT
Start: 2019-02-07 | End: 2019-02-07 | Stop reason: HOSPADM

## 2019-02-07 RX ORDER — ONDANSETRON 2 MG/ML
4 INJECTION INTRAMUSCULAR; INTRAVENOUS EVERY 6 HOURS PRN
Status: DISCONTINUED | OUTPATIENT
Start: 2019-02-07 | End: 2019-02-09 | Stop reason: HOSPADM

## 2019-02-07 RX ORDER — SODIUM CHLORIDE 0.9 % (FLUSH) 0.9 %
3 SYRINGE (ML) INJECTION EVERY 12 HOURS SCHEDULED
Status: DISCONTINUED | OUTPATIENT
Start: 2019-02-07 | End: 2019-02-07 | Stop reason: HOSPADM

## 2019-02-07 RX ORDER — MISOPROSTOL 200 UG/1
600 TABLET ORAL ONCE
Status: DISCONTINUED | OUTPATIENT
Start: 2019-02-08 | End: 2019-02-09 | Stop reason: HOSPADM

## 2019-02-07 RX ORDER — LANOLIN 100 %
OINTMENT (GRAM) TOPICAL
Status: DISCONTINUED | OUTPATIENT
Start: 2019-02-07 | End: 2019-02-09 | Stop reason: HOSPADM

## 2019-02-07 RX ORDER — ALBUTEROL SULFATE 2.5 MG/3ML
2.5 SOLUTION RESPIRATORY (INHALATION) EVERY 6 HOURS PRN
Status: DISCONTINUED | OUTPATIENT
Start: 2019-02-07 | End: 2019-02-09 | Stop reason: HOSPADM

## 2019-02-07 RX ORDER — SODIUM CHLORIDE 0.9 % (FLUSH) 0.9 %
3-10 SYRINGE (ML) INJECTION AS NEEDED
Status: DISCONTINUED | OUTPATIENT
Start: 2019-02-07 | End: 2019-02-07 | Stop reason: HOSPADM

## 2019-02-07 RX ORDER — DOCUSATE SODIUM 100 MG/1
100 CAPSULE, LIQUID FILLED ORAL 2 TIMES DAILY
Status: DISCONTINUED | OUTPATIENT
Start: 2019-02-08 | End: 2019-02-09 | Stop reason: HOSPADM

## 2019-02-07 RX ORDER — BISACODYL 10 MG
10 SUPPOSITORY, RECTAL RECTAL DAILY PRN
Status: DISCONTINUED | OUTPATIENT
Start: 2019-02-08 | End: 2019-02-09 | Stop reason: HOSPADM

## 2019-02-07 RX ORDER — PRENATAL VIT/IRON FUM/FOLIC AC 27MG-0.8MG
1 TABLET ORAL DAILY
Status: DISCONTINUED | OUTPATIENT
Start: 2019-02-08 | End: 2019-02-09 | Stop reason: HOSPADM

## 2019-02-07 RX ORDER — FENTANYL CITRATE 50 UG/ML
INJECTION, SOLUTION INTRAMUSCULAR; INTRAVENOUS AS NEEDED
Status: DISCONTINUED | OUTPATIENT
Start: 2019-02-07 | End: 2019-02-07 | Stop reason: SURG

## 2019-02-07 RX ORDER — CALCIUM CARBONATE 200(500)MG
2 TABLET,CHEWABLE ORAL 3 TIMES DAILY PRN
Status: DISCONTINUED | OUTPATIENT
Start: 2019-02-07 | End: 2019-02-09 | Stop reason: HOSPADM

## 2019-02-07 RX ORDER — OXYTOCIN/0.9 % SODIUM CHLORIDE 30/500 ML
2-30 PLASTIC BAG, INJECTION (ML) INTRAVENOUS
Status: DISCONTINUED | OUTPATIENT
Start: 2019-02-07 | End: 2019-02-07 | Stop reason: HOSPADM

## 2019-02-07 RX ADMIN — SODIUM CHLORIDE, POTASSIUM CHLORIDE, SODIUM LACTATE AND CALCIUM CHLORIDE 1000 ML: 600; 310; 30; 20 INJECTION, SOLUTION INTRAVENOUS at 15:25

## 2019-02-07 RX ADMIN — OXYTOCIN-SODIUM CHLORIDE 0.9% IV SOLN 30 UNIT/500ML 2 MILLI-UNITS/MIN: 30-0.9/5 SOLUTION at 05:47

## 2019-02-07 RX ADMIN — SODIUM CHLORIDE, POTASSIUM CHLORIDE, SODIUM LACTATE AND CALCIUM CHLORIDE 1000 ML: 600; 310; 30; 20 INJECTION, SOLUTION INTRAVENOUS at 08:04

## 2019-02-07 RX ADMIN — BUPIVACAINE HYDROCHLORIDE 8 ML: 2.5 INJECTION, SOLUTION EPIDURAL; INFILTRATION; INTRACAUDAL; PERINEURAL at 19:25

## 2019-02-07 RX ADMIN — SODIUM CHLORIDE, POTASSIUM CHLORIDE, SODIUM LACTATE AND CALCIUM CHLORIDE: 600; 310; 30; 20 INJECTION, SOLUTION INTRAVENOUS at 07:00

## 2019-02-07 RX ADMIN — SODIUM CHLORIDE, POTASSIUM CHLORIDE, SODIUM LACTATE AND CALCIUM CHLORIDE 125 ML/HR: 600; 310; 30; 20 INJECTION, SOLUTION INTRAVENOUS at 05:38

## 2019-02-07 RX ADMIN — Medication 8 ML/HR: at 08:15

## 2019-02-07 RX ADMIN — SODIUM CHLORIDE, POTASSIUM CHLORIDE, SODIUM LACTATE AND CALCIUM CHLORIDE 1000 ML: 600; 310; 30; 20 INJECTION, SOLUTION INTRAVENOUS at 09:19

## 2019-02-07 RX ADMIN — FENTANYL CITRATE 250 MCG: 50 INJECTION, SOLUTION INTRAMUSCULAR; INTRAVENOUS at 08:15

## 2019-02-07 NOTE — PROGRESS NOTES
Patient bradycardia after going to the bathroom.  Had had one earlier.  Responded to intrauterine resuscitator.  AROM clear internal leads placed.  We will plan to have epidural placed for analgesia and encased  section needed in plan to observe to see if amniotomy places in full labor.  I have case in OR patient asleep have discussed with Dr. Booker who agrees to cover.  Patient informed

## 2019-02-07 NOTE — ANESTHESIA PREPROCEDURE EVALUATION
Anesthesia Evaluation     NPO Solid Status: < 2 hours  NPO Liquid Status: < 2 hours           Airway   Mallampati: III  Dental - normal exam     Pulmonary    Cardiovascular - normal exam        Neuro/Psych  GI/Hepatic/Renal/Endo      Musculoskeletal     Abdominal    Substance History      OB/GYN          Other                        Anesthesia Plan    ASA 2     epidural     intravenous induction   Anesthetic plan, all risks, benefits, and alternatives have been provided, discussed and informed consent has been obtained with: patient.

## 2019-02-08 PROBLEM — O36.5990 IUGR (INTRAUTERINE GROWTH RESTRICTION) AFFECTING CARE OF MOTHER: Status: RESOLVED | Noted: 2019-02-07 | Resolved: 2019-02-08

## 2019-02-08 PROBLEM — Z37.9 NORMAL LABOR: Status: RESOLVED | Noted: 2018-11-19 | Resolved: 2019-02-08

## 2019-02-08 PROBLEM — O47.03 PRETERM UTERINE CONTRACTIONS, ANTEPARTUM, THIRD TRIMESTER: Status: RESOLVED | Noted: 2019-01-05 | Resolved: 2019-02-08

## 2019-02-08 LAB
BASOPHILS # BLD AUTO: 0.03 10*3/MM3 (ref 0–0.2)
BASOPHILS NFR BLD AUTO: 0.1 % (ref 0–2)
DEPRECATED RDW RBC AUTO: 44.4 FL (ref 36.4–46.3)
EOSINOPHIL # BLD AUTO: 0.3 10*3/MM3 (ref 0–0.7)
EOSINOPHIL NFR BLD AUTO: 1.4 % (ref 0–7)
ERYTHROCYTE [DISTWIDTH] IN BLOOD BY AUTOMATED COUNT: 14 % (ref 11.5–14.5)
HCT VFR BLD AUTO: 31.1 % (ref 35–45)
HGB BLD-MCNC: 10.7 G/DL (ref 12–15.5)
IMM GRANULOCYTES # BLD AUTO: 0.19 10*3/MM3 (ref 0–0.02)
IMM GRANULOCYTES NFR BLD AUTO: 0.9 % (ref 0–0.5)
LYMPHOCYTES # BLD AUTO: 3.12 10*3/MM3 (ref 0.6–4.2)
LYMPHOCYTES NFR BLD AUTO: 14.3 % (ref 10–50)
MCH RBC QN AUTO: 29.7 PG (ref 26.5–34)
MCHC RBC AUTO-ENTMCNC: 34.4 G/DL (ref 31.4–36)
MCV RBC AUTO: 86.4 FL (ref 80–98)
MONOCYTES # BLD AUTO: 2.27 10*3/MM3 (ref 0–0.9)
MONOCYTES NFR BLD AUTO: 10.4 % (ref 0–12)
NEUTROPHILS # BLD AUTO: 15.96 10*3/MM3 (ref 2–8.6)
NEUTROPHILS NFR BLD AUTO: 72.9 % (ref 37–80)
PLATELET # BLD AUTO: 294 10*3/MM3 (ref 150–450)
PMV BLD AUTO: 10.2 FL (ref 8–12)
RBC # BLD AUTO: 3.6 10*6/MM3 (ref 3.77–5.16)
WBC NRBC COR # BLD: 21.87 10*3/MM3 (ref 3.2–9.8)

## 2019-02-08 PROCEDURE — 85025 COMPLETE CBC W/AUTO DIFF WBC: CPT | Performed by: STUDENT IN AN ORGANIZED HEALTH CARE EDUCATION/TRAINING PROGRAM

## 2019-02-08 RX ORDER — SWAB
1 SWAB, NON-MEDICATED MISCELLANEOUS DAILY
Qty: 30 EACH | Refills: 0 | OUTPATIENT
Start: 2019-02-08 | End: 2019-09-19

## 2019-02-08 RX ADMIN — IBUPROFEN 600 MG: 600 TABLET ORAL at 01:58

## 2019-02-08 RX ADMIN — PRENATAL VIT W/ FE FUMARATE-FA TAB 27-0.8 MG 1 TABLET: 27-0.8 TAB at 11:07

## 2019-02-08 RX ADMIN — DOCUSATE SODIUM 100 MG: 100 CAPSULE, LIQUID FILLED ORAL at 11:07

## 2019-02-08 RX ADMIN — ACETAMINOPHEN 650 MG: 325 TABLET, FILM COATED ORAL at 11:06

## 2019-02-08 RX ADMIN — ACETAMINOPHEN 650 MG: 325 TABLET, FILM COATED ORAL at 20:28

## 2019-02-08 RX ADMIN — ACETAMINOPHEN 650 MG: 325 TABLET, FILM COATED ORAL at 15:42

## 2019-02-08 RX ADMIN — Medication: at 15:26

## 2019-02-08 NOTE — H&P
[facility]  Obstetric History and Physical    Chief Complaint   Patient presents with   • Scheduled Induction   IUGR        Patient is a 28 y.o. female  currently at 38w0d, who presents with intrauterine growth restriction on the basis of abdominal circumference.  After reviewing the risks benefits alternatives we discussed timing of delivery and A.C.O.G. recommendations.  Wants to proceed with delivery at 38 weeks have discussed the risk of induction hypertonic contractions damage to mother baby possible risk of early term delivery both short-term and long-term risk of iatrogenic  Both the short term and long-term risk of  section are reviewed at length.  Short-term risk of bleeding infection problems with wound healing damage to bowel bladder or ureter.  Small but real risk of maternal mortality is reviewed and understood.    Long-term risk include in future pregnancies accreta abruption uterine rupture and stillbirth among others might be ameliorated by a .  The patient and her family have been given opportunity to ask questions and these questions have been answered to their satisfaction.           Obstetric History   #: 1, Date: , Sex: None, Weight: None, GA: None, Delivery: Spontaneous , Apgar1: None, Apgar5: None, Living: None, Birth Comments: None    #: 2, Date: , Sex: None, Weight: None, GA: None, Delivery: Vaginal, Spontaneous, Apgar1: None, Apgar5: None, Living: Living, Birth Comments: None    #: 3, Date: , Sex: None, Weight: None, GA: None, Delivery: Spontaneous , Apgar1: None, Apgar5: None, Living: None, Birth Comments: None    #: 4, Date: 19, Sex: Male, Weight: 2670 g (5 lb 14.2 oz), GA: 38w0d, Delivery: , Spontaneous, Apgar1: 8, Apgar5: 9, Living: Living, Birth Comments: None       The following portions of the patients history were reviewed and updated as appropriate: current medications, allergies, past medical history, past  surgical history, past family history, past social history and problem list .       Prenatal Information:  Prenatal Results     Initial Prenatal Labs     Test Value Reference Range Date Time    Hemoglobin        Hematocrit        Platelets 359 10*3/mm3 150 - 450 10*3/mm3 02/07/19 0532    Rubella IgG        Hepatitis B SAg        Hepatitis C Ab        RPR        ABO A   02/07/19 0532    Rh Positive   02/07/19 0532    Antibody Screen        HIV        Urine Culture Mixed Keila Isolated   11/19/18 2236    Gonorrhea Negative  Negative 12/06/18 1144    Chlamydia Negative  Negative 12/06/18 1144    TSH              2nd and 3rd Trimester     Test Value Reference Range Date Time    Hemoglobin (repeated) 12.4 g/dL 12.0 - 15.5 g/dL 02/07/19 0532    Hematocrit (repeated) 35.8 % 35.0 - 45.0 % 02/07/19 0532     mg/dL 60 - 140 mg/dL 12/27/18 1146    Antibody Screen (repeated) Negative   02/07/19 0532    GTT Fasting        GTT 1 Hr        GTT 2 Hr        GTT 3 Hr        Group B Strep Negative  Negative 01/14/19 1922          Drug Screening     Test Value Reference Range Date Time    Amphetamine Screen Negative  Negative 02/07/19 0532    Barbiturate Screen Negative  Negative 02/07/19 0532    Benzodiazepine Screen Negative  Negative 02/07/19 0532    Methadone Screen Negative  Negative 02/07/19 0532    Phencyclidine Screen        Opiates Screen Negative  Negative 02/07/19 0532    THC Screen Negative  Negative 02/07/19 0532    Cocaine Screen Negative  Negative 02/07/19 0532    Propoxyphene Screen        Buprenorphine Screen        Methamphetamine Screen        Oxycodone Screen Negative  Negative 02/07/19 0532    Tricyclic Antidepressants Screen              Other (Risk screening)     Test Value Reference Range Date Time    Varicella IgG        Parvovirus IgG        CMV IgG        Cystic Fibrosis        Hemoglobin electrophoresis        NIPT        MSAFP-4        AFP (for NTD only)                  External Prenatal Results      Pregnancy Outside Results - Transcribed From Office Records - See Scanned Records For Details     Test Value Date Time    Hgb 12.4 g/dL 19 0532    Hct 35.8 % 19 0532    ABO A  19 0532    Rh Positive  19 0532    Antibody Screen Negative  19 0532    Glucose Fasting GTT       Glucose Tolerance Test 1 hour       Glucose Tolerance Test 3 hour       Gonorrhea (discrete) Negative  18 1144    Chlamydia (discrete) Negative  18 1144    RPR       VDRL       Syphilis Antibody       Rubella       HBsAg       Herpes Simplex Virus PCR       Herpes Simplex VIrus Culture       HIV       Hep C RNA Quant PCR       Hep C Antibody       AFP       Group B Strep Negative  19 1922    GBS Susceptibility to Clindamycin       GBS Susceptibility to Erythromycin       Fetal Fibronectin Positive  19 1537    Genetic Testing, Maternal Blood             Drug Screening     Test Value Date Time    Urine Drug Screen       Amphetamine Screen Negative  19 0532    Barbiturate Screen Negative  19 0532    Benzodiazepine Screen Negative  19 0532    Methadone Screen Negative  19 0532    Phencyclidine Screen       Opiates Screen Negative  19 0532    THC Screen Negative  19 0532    Cocaine Screen       Propoxyphene Screen       Buprenorphine Screen       Methamphetamine Screen       Oxycodone Screen Negative  19 0532    Tricyclic Antidepressants Screen                    Past OB History:     Obstetric History       T1      L2     SAB2   TAB0   Ectopic0   Molar0   Multiple0   Live Births2       # Outcome Date GA Lbr Vern/2nd Weight Sex Delivery Anes PTL Lv   4 Term 19 38w0d / 00:07 2670 g (5 lb 14.2 oz) M  EPI N ISAIAH      Name: BASSSONIA      Apgar1:  8                Apgar5: 9   3 2018     SAB      2  2015     Vag-Spont   ISAIAH   1 2009     SAB              ALLERGIES:   No Known Allergies     Home Medications:     Prior to  Admission medications    Medication Sig Start Date End Date Taking? Authorizing Provider   albuterol sulfate HFA (VENTOLIN HFA) 108 (90 Base) MCG/ACT inhaler Inhale 2 puffs Every 6 (Six) Hours As Needed. 12/10/18   Arturo Mcclain MD   HYDROcodone-acetaminophen (NORCO) 5-325 MG per tablet Take 1 tablet by mouth Every 4 (Four) Hours As Needed. 1/22/19   ProviderArturo MD       Past Medical History: Past Medical History:   Diagnosis Date   • Abnormal Pap smear of cervix    • Anxiety    • Asthma    • Cervical dysplasia    • Cyst of ovary    • Palpitations    • Tobacco dependence syndrome    • URI (upper respiratory infection)       Past Surgical History Past Surgical History:   Procedure Laterality Date   • CERVICAL BIOPSY  W/ LOOP ELECTRODE EXCISION     • ENDOSCOPY  01/27/2012    Normal esophagus. Gastritis in stomach. Biopsy taken. Normal duodenum. Biopsy taken.   • ENDOSCOPY AND COLONOSCOPY  01/27/2012    Colitis in rectum. Biopsy taken.      Family History: Family History   Problem Relation Age of Onset   • Colon cancer Father    • Coronary artery disease Father    • Hypertension Father    • Kidney disease Father    • Thyroid disease Sister    • Coronary artery disease Paternal Grandfather    • Coronary artery disease Maternal Grandmother    • Coronary artery disease Maternal Grandfather       Social History:  reports that she has been smoking cigarettes.  She has been smoking about 1.00 pack per day. she has never used smokeless tobacco.   reports that she does not drink alcohol.   reports that she does not use drugs.        ROS:                                                                                                                                  Neuro no history of brain tumor    HENT no history of ear tumors    Eye no history of retinal tumors    Pulmonary no history of lung tumors    Cardiac no history of cardiac tumors    GI: No history of small bowel tumors    Musculoskeletal: No  history of skeletal muscle tumors    Endocrine: No history of adrenal tumors    Lymphatic: No history of Hodgkin's disease    Renal: No history of renal cancer    Objective     PHYSICAL EXANIMATIONConstitutional: Appears to be in no acute distress;Eyes sclera normal; endocrine system thyroid palpates is normal; pulmonary system lungs clear; cardiovascular system heart regular rate and rhythm;gastrointestinal system abdomen soft nontender active bowel sound;urologic system CVA negative;psychiatric appropriate insight;neurologic gait within normal limits  Vital Signs Range for the last 24 hours  Temperature: Temp:  [98 °F (36.7 °C)-98.6 °F (37 °C)] 98.2 °F (36.8 °C)   Temp Source: Temp src: Oral   BP: BP: ()/(45-85) 108/67   Pulse: Heart Rate:  [] 115   Respirations: Resp:  [16-18] 16   SPO2: SpO2:  [96 %-99 %] 99 %   O2 Amount (l/min):     O2 Devices     Weight: Weight:  [68 kg (150 lb)] 68 kg (150 lb)       OBGyn Exam    Presentation:  Cephalic   Cervix: Exam by: Method: sterile exam per physician   Dilation:  3 cm on admission   Effacement: Cervical Effacement: 90%   Station:         Assessment/Plan       IUGR (intrauterine growth restriction) affecting care of mother      Assessment:  1. Intrauterine pregnancy at 38 and 0/7 sevenths    GBS status: Results in Past 90 Days  Result Component Current Result Ref Range Previous Result Ref Range   Group B Strep, DNA Negative (1/14/2019) Negative Not in Time Range        Plan:  1. Admit to labor and delivery.  2. Plan of care has been reviewed with staff, patient, and family.  3. Risks, benefits of treatment plan have been discussed.  4. All questions have been answered.        This document has been electronically signed by Harish Johnson MD on February 7, 2019 10:44 PM

## 2019-02-08 NOTE — ANESTHESIA POSTPROCEDURE EVALUATION
Patient: Sebastian Alas    Procedure Summary     Date:  02/07/19 Room / Location:      Anesthesia Start:  0745 Anesthesia Stop:  2132    Procedure:  LABOR ANALGESIA Diagnosis:      Scheduled Providers:   Provider:  Heriberto Vargas CRNA    Anesthesia Type:  epidural ASA Status:  2          Anesthesia Type: epidural  Last vitals  BP   113/68 (02/07/19 2041)   Temp   98.2 °F (36.8 °C) (02/07/19 2011)   Pulse   96 (02/07/19 2041)   Resp   16 (02/07/19 2056)     SpO2   99 % (02/07/19 0900)     Post Anesthesia Care and Evaluation    Patient location during evaluation: bedside  Patient participation: complete - patient participated  Level of consciousness: awake and alert  Pain score: 0  Pain management: adequate  Airway patency: patent  Anesthetic complications: No anesthetic complications  PONV Status: none  Cardiovascular status: acceptable  Respiratory status: acceptable  Hydration status: acceptable

## 2019-02-08 NOTE — PLAN OF CARE
Problem: Patient Care Overview  Goal: Plan of Care Review  Outcome: Ongoing (interventions implemented as appropriate)   02/08/19 0430   Coping/Psychosocial   Plan of Care Reviewed With patient   Plan of Care Review   Progress improving   OTHER   Outcome Summary Breastfeeding.      Goal: Individualization and Mutuality  Outcome: Ongoing (interventions implemented as appropriate)    Goal: Discharge Needs Assessment  Outcome: Ongoing (interventions implemented as appropriate)    Goal: Interprofessional Rounds/Family Conf  Outcome: Ongoing (interventions implemented as appropriate)      Problem: Labor (Cervical Ripen, Induct, Augment) (Adult,Obstetrics,Pediatric)  Goal: Signs and Symptoms of Listed Potential Problems Will be Absent, Minimized or Managed (Labor)  Outcome: Ongoing (interventions implemented as appropriate)

## 2019-02-08 NOTE — DISCHARGE SUMMARY
[facility]  Sebastian Alas  : 1990  MRN: [id number]  CSN: [id number]    Discharge Summary:    Date of Admission: 2019  Date of Discharge:  2019    Admitting Diagnosis:  1. IUP @ 38w0d  2. for induction of labor     Discharge Diagnosis:  1. S/P        History and Hospital Course:  Patient is a   who presents for induction of labor.  Her pregnancy was complicated by intrauterine growth restriction.  .       She was admitted and progressed in labor with pitocin augmentation and epidural analgesia to completely dilated. She had a vaginal delivery of a  viable male   infant who weighed 2670 g (5 lb 14.2 oz)  and APGARs of        APGARS  One minute Five minutes Ten minutes Fifteen minutes Twenty minutes   Skin color: 0   1             Heart rate: 2   2             Grimace: 2   2              Muscle tone: 2   2              Breathin   2              Totals: 8   9              . No immediate complications were encountered.      Her postpartum course has been unremarkable. She had no signs or symptoms of acute blood loss anemia. She was ambulating well, voiding without difficulty and lochia was within normal limits. She was breast feeding without difficulty. She was stable for discharge on postpartum day 1.      Precautions and instructions were discussed with her including but not limited to maintaining a regular diet at home, practicing local hygiene, pelvic rest, and signs and symptoms to report including heavy bleeding, frequent passage of clots, fainting or dizziness, foul odor of lochia, increasing pain, fever, or any other concerns.    She was asked to follow up in the office in 2 weeks.      Discharge Diet: Regular  Discharge Activity: As tolerated. Vaginal rest 6 weeks.  Discharge Medications:     Discharge Medications      ASK your doctor about these medications      Instructions Start Date   HYDROcodone-acetaminophen 5-325 MG per tablet  Commonly known as:  NORCO   1  tablet, Oral, Every 4 Hours PRN      VENTOLIN  (90 Base) MCG/ACT inhaler  Generic drug:  albuterol sulfate HFA   2 puffs, Inhalation, Every 6 Hours PRN           Patient will restart all home medications including prenatal vitamins.    Disposition to home in stable condition        Rashaad Odonnell M.D. PGY1  [facility]  [address] [place] [address]  Office: [phone]    This document has been electronically signed by Rashaad Odonnell MD on February 8, 2019 7:49 AM      I have seen and evaluated the patient.  I have discussed the case with the resident. I have reviewed the notes, assessment and plan, and/or procedures performed by the resident. I concur with the resident’s documentation.  norco  5 dc and prescription norco 7.5 # 20 1 q 4-6 given as says ibuprofen inadequate alone       This document has been electronically signed by Harish Johnson MD on February 8, 2019 3:13 PM

## 2019-02-08 NOTE — PROGRESS NOTES
[facility]  Sebastian Alas  : 1990  MRN: [id number]  CSN: [id number]    Postpartum Day #1  Subjective   The patient has no complaints. No abdominal pain. Pain well controlled. Lochia minimal; passed one blood clot 1 teaspoon in volume. Ambulating well. Breast feeding. Would like to go home if infant cleared for discharge.      Objective     Min/max vitals past 24 hours:   Temp  Min: 98 °F (36.7 °C)  Max: 98.8 °F (37.1 °C)  BP  Min: 88/52  Max: 127/85  Pulse  Min: 76  Max: 118  Pulse  Min: 76  Max: 118        Abdomen: soft, non-tender; bowel sounds normal; no masses   fundus firm and non-tender   Calves: Nontender, no cords palpable   Pelvic: deferred     Lab Results   Component Value Date    WBC 21.87 (H) 2019    HGB 10.7 (L) 2019    HCT 31.1 (L) 2019    MCV 86.4 2019     2019    RH Positive 2019        Assessment   1. Postpartum Day #1 S/P vaginal delivery     Plan   1. Continue routine postpartum care        Rashaad Odonnell M.D. PGY1  [facility]  [address] [place] [address]  Office: [phone]    This document has been electronically signed by Rashaad Odonnell MD on 2019 6:57 AM    I have seen and evaluated the patient.  I have discussed the case with the resident. I have reviewed the notes, assessment and plan, and/or procedures performed by the resident. I concur with the resident’s documentation.        This document has been electronically signed by Harish Johnson MD on 2019 3:12 PM    Patient desired to stay  to . On  doing well

## 2019-02-08 NOTE — PAYOR COMM NOTE
Derik Bass (28 y.o. Female)     Date of Birth Social Security Number Address Home Phone MRN    1990  [id number] [phone] [id number]    Jewish Marital Status          Yazidism Single       Admission Date Admission Type Admitting Provider Attending Provider Department, Room/Bed    19 Elective Harish Johnson MD Neely, Thomas S, MD [facility] MOTHER BABY, M760/1    Discharge Date Discharge Disposition Discharge Destination         Home or Self Care              Attending Provider:  Harish Johnson MD    Allergies:  No Known Allergies    Isolation:  None   Infection:  None   Code Status:  CPR    Ht:  --   Wt:  68 kg (150 lb)    Admission Cmt:  None   Principal Problem:  None                Active Insurance as of 2019     Primary Coverage     Payor Plan Insurance Group Employer/Plan Group    HUMANA MEDICAID HU[employer] [id number]     Payor Plan Address Payor Plan Phone Number Payor Plan Fax Number Effective Dates    PO  [phone]  2017 - None Entered    [facility] [id number]       Subscriber Name Subscriber Birth Date Member ID       DERIK BASS 1990 [id number]                 Emergency Contacts      (Rel.) Home Phone Work Phone Mobile Phone    Alexandria Bass (Mother) [phone] -- [phone]        [facility]  P:[phone]  F:[phone]       History & Physical      Harish Johnson MD at 2019 10:44 PM          [facility]  Obstetric History and Physical    Chief Complaint   Patient presents with   • Scheduled Induction   IUGR        Patient is a 28 y.o. female  currently at 38w0d, who presents with intrauterine growth restriction on the basis of abdominal circumference.  After reviewing the risks benefits alternatives we discussed timing of delivery and A.C.O.G. recommendations.  Wants to proceed with delivery at 38 weeks have  discussed the risk of induction hypertonic contractions damage to mother baby possible risk of early term delivery both short-term and long-term risk of iatrogenic  Both the short term and long-term risk of  section are reviewed at length.  Short-term risk of bleeding infection problems with wound healing damage to bowel bladder or ureter.  Small but real risk of maternal mortality is reviewed and understood.    Long-term risk include in future pregnancies accreta abruption uterine rupture and stillbirth among others might be ameliorated by a .  The patient and her family have been given opportunity to ask questions and these questions have been answered to their satisfaction.           Obstetric History   #: 1, Date: , Sex: None, Weight: None, GA: None, Delivery: Spontaneous , Apgar1: None, Apgar5: None, Living: None, Birth Comments: None    #: 2, Date: , Sex: None, Weight: None, GA: None, Delivery: Vaginal, Spontaneous, Apgar1: None, Apgar5: None, Living: Living, Birth Comments: None    #: 3, Date: , Sex: None, Weight: None, GA: None, Delivery: Spontaneous , Apgar1: None, Apgar5: None, Living: None, Birth Comments: None    #: 4, Date: 19, Sex: Male, Weight: 2670 g (5 lb 14.2 oz), GA: 38w0d, Delivery: , Spontaneous, Apgar1: 8, Apgar5: 9, Living: Living, Birth Comments: None       The following portions of the patients history were reviewed and updated as appropriate: current medications, allergies, past medical history, past surgical history, past family history, past social history and problem list .       Prenatal Information:  Prenatal Results     Initial Prenatal Labs     Test Value Reference Range Date Time    Hemoglobin        Hematocrit        Platelets 359 10*3/mm3 150 - 450 10*3/mm3 19 0532    Rubella IgG        Hepatitis B SAg        Hepatitis C Ab        RPR        ABO A   19 0532    Rh Positive   19 0532    Antibody Screen         HIV        Urine Culture Mixed Keila Isolated   11/19/18 2236    Gonorrhea Negative  Negative 12/06/18 1144    Chlamydia Negative  Negative 12/06/18 1144    TSH              2nd and 3rd Trimester     Test Value Reference Range Date Time    Hemoglobin (repeated) 12.4 g/dL 12.0 - 15.5 g/dL 02/07/19 0532    Hematocrit (repeated) 35.8 % 35.0 - 45.0 % 02/07/19 0532     mg/dL 60 - 140 mg/dL 12/27/18 1146    Antibody Screen (repeated) Negative   02/07/19 0532    GTT Fasting        GTT 1 Hr        GTT 2 Hr        GTT 3 Hr        Group B Strep Negative  Negative 01/14/19 1922          Drug Screening     Test Value Reference Range Date Time    Amphetamine Screen Negative  Negative 02/07/19 0532    Barbiturate Screen Negative  Negative 02/07/19 0532    Benzodiazepine Screen Negative  Negative 02/07/19 0532    Methadone Screen Negative  Negative 02/07/19 0532    Phencyclidine Screen        Opiates Screen Negative  Negative 02/07/19 0532    THC Screen Negative  Negative 02/07/19 0532    Cocaine Screen Negative  Negative 02/07/19 0532    Propoxyphene Screen        Buprenorphine Screen        Methamphetamine Screen        Oxycodone Screen Negative  Negative 02/07/19 0532    Tricyclic Antidepressants Screen              Other (Risk screening)     Test Value Reference Range Date Time    Varicella IgG        Parvovirus IgG        CMV IgG        Cystic Fibrosis        Hemoglobin electrophoresis        NIPT        MSAFP-4        AFP (for NTD only)                  External Prenatal Results     Pregnancy Outside Results - Transcribed From Office Records - See Scanned Records For Details     Test Value Date Time    Hgb 12.4 g/dL 02/07/19 0532    Hct 35.8 % 02/07/19 0532    ABO A  02/07/19 0532    Rh Positive  02/07/19 0532    Antibody Screen Negative  02/07/19 0532    Glucose Fasting GTT       Glucose Tolerance Test 1 hour       Glucose Tolerance Test 3 hour       Gonorrhea (discrete) Negative  12/06/18 1144    Chlamydia  (discrete) Negative  18 1144    RPR       VDRL       Syphilis Antibody       Rubella       HBsAg       Herpes Simplex Virus PCR       Herpes Simplex VIrus Culture       HIV       Hep C RNA Quant PCR       Hep C Antibody       AFP       Group B Strep Negative  19 1922    GBS Susceptibility to Clindamycin       GBS Susceptibility to Erythromycin       Fetal Fibronectin Positive  19 1537    Genetic Testing, Maternal Blood             Drug Screening     Test Value Date Time    Urine Drug Screen       Amphetamine Screen Negative  19 0532    Barbiturate Screen Negative  19 0532    Benzodiazepine Screen Negative  19 0532    Methadone Screen Negative  19 0532    Phencyclidine Screen       Opiates Screen Negative  19 0532    THC Screen Negative  19 0532    Cocaine Screen       Propoxyphene Screen       Buprenorphine Screen       Methamphetamine Screen       Oxycodone Screen Negative  19 0532    Tricyclic Antidepressants Screen                    Past OB History:     Obstetric History       T1      L2     SAB2   TAB0   Ectopic0   Molar0   Multiple0   Live Births2       # Outcome Date GA Lbr Vern/2nd Weight Sex Delivery Anes PTL Lv   4 Term 19 38w0d / 00:07 2670 g (5 lb 14.2 oz) M  EPI N ISAIAH      Name: SONIA BASS      Apgar1:  8                Apgar5: 9   3 SAB      SAB      2       Vag-Spont   ISAIAH   1 2009     SAB              ALLERGIES:   No Known Allergies     Home Medications:     Prior to Admission medications    Medication Sig Start Date End Date Taking? Authorizing Provider   albuterol sulfate HFA (VENTOLIN HFA) 108 (90 Base) MCG/ACT inhaler Inhale 2 puffs Every 6 (Six) Hours As Needed. 12/10/18   Arturo Mcclain MD   HYDROcodone-acetaminophen (NORCO) 5-325 MG per tablet Take 1 tablet by mouth Every 4 (Four) Hours As Needed. 19   Arturo Mcclain MD       Past Medical History: Past Medical  History:   Diagnosis Date   • Abnormal Pap smear of cervix    • Anxiety    • Asthma    • Cervical dysplasia    • Cyst of ovary    • Palpitations    • Tobacco dependence syndrome    • URI (upper respiratory infection)       Past Surgical History Past Surgical History:   Procedure Laterality Date   • CERVICAL BIOPSY  W/ LOOP ELECTRODE EXCISION     • ENDOSCOPY  01/27/2012    Normal esophagus. Gastritis in stomach. Biopsy taken. Normal duodenum. Biopsy taken.   • ENDOSCOPY AND COLONOSCOPY  01/27/2012    Colitis in rectum. Biopsy taken.      Family History: Family History   Problem Relation Age of Onset   • Colon cancer Father    • Coronary artery disease Father    • Hypertension Father    • Kidney disease Father    • Thyroid disease Sister    • Coronary artery disease Paternal Grandfather    • Coronary artery disease Maternal Grandmother    • Coronary artery disease Maternal Grandfather       Social History:  reports that she has been smoking cigarettes.  She has been smoking about 1.00 pack per day. she has never used smokeless tobacco.   reports that she does not drink alcohol.   reports that she does not use drugs.        ROS:                                                                                                                                  Neuro no history of brain tumor    HENT no history of ear tumors    Eye no history of retinal tumors    Pulmonary no history of lung tumors    Cardiac no history of cardiac tumors    GI: No history of small bowel tumors    Musculoskeletal: No history of skeletal muscle tumors    Endocrine: No history of adrenal tumors    Lymphatic: No history of Hodgkin's disease    Renal: No history of renal cancer    Objective     PHYSICAL EXANIMATIONConstitutional: Appears to be in no acute distress;Eyes sclera normal; endocrine system thyroid palpates is normal; pulmonary system lungs clear; cardiovascular system heart regular rate and rhythm;gastrointestinal system abdomen soft  nontender active bowel sound;urologic system CVA negative;psychiatric appropriate insight;neurologic gait within normal limits  Vital Signs Range for the last 24 hours  Temperature: Temp:  [98 °F (36.7 °C)-98.6 °F (37 °C)] 98.2 °F (36.8 °C)   Temp Source: Temp src: Oral   BP: BP: ()/(45-85) 108/67   Pulse: Heart Rate:  [] 115   Respirations: Resp:  [16-18] 16   SPO2: SpO2:  [96 %-99 %] 99 %   O2 Amount (l/min):     O2 Devices     Weight: Weight:  [68 kg (150 lb)] 68 kg (150 lb)       OBGyn Exam    Presentation:  Cephalic   Cervix: Exam by: Method: sterile exam per physician   Dilation:  3 cm on admission   Effacement: Cervical Effacement: 90%   Station:         Assessment/Plan       IUGR (intrauterine growth restriction) affecting care of mother      Assessment:  1. Intrauterine pregnancy at 38 and 0/7 sevenths    GBS status: Results in Past 90 Days  Result Component Current Result Ref Range Previous Result Ref Range   Group B Strep, DNA Negative (1/14/2019) Negative Not in Time Range        Plan:  1. Admit to labor and delivery.  2. Plan of care has been reviewed with staff, patient, and family.  3. Risks, benefits of treatment plan have been discussed.  4. All questions have been answered.        This document has been electronically signed by Harish Johnson MD on February 7, 2019 10:44 PM    Electronically signed by Harish Johnson MD at 2/7/2019 10:52 PM       Hospital Medications (all)       Dose Frequency Start End    acetaminophen (TYLENOL) tablet 650 mg 650 mg Every 4 Hours PRN 2/7/2019     Sig - Route: Take 2 tablets by mouth Every 4 (Four) Hours As Needed for Mild Pain . - Oral    Cosign for Ordering: Required by Harish Johnson MD    albuterol (PROVENTIL) nebulizer solution 0.083% 2.5 mg/3mL 2.5 mg Every 6 Hours PRN 2/7/2019     Sig - Route: Take 2.5 mg by nebulization Every 6 (Six) Hours As Needed for Wheezing or Shortness of Air. - Nebulization    Cosign for Ordering: Required by Elizabeth  Harish SMITH MD    benzocaine (AMERICAINE) 20 % rectal ointment 1 application 1 application As Needed 2/7/2019     Sig - Route: Insert 1 application into the rectum As Needed for Hemorrhoids. - Rectal    Cosign for Ordering: Required by Harish Johnson MD    benzocaine-lanolin-aloe vera (DERMOPLAST) 20-0.5 % topical spray  As Needed 2/7/2019     Sig - Route: Apply  topically to the appropriate area as directed As Needed for Mild Pain  (perineal pain). - Topical    Cosign for Ordering: Required by Harish Johnson MD    bisacodyl (DULCOLAX) suppository 10 mg 10 mg Daily PRN 2/8/2019     Sig - Route: Insert 1 suppository into the rectum Daily As Needed for Constipation. - Rectal    Cosign for Ordering: Required by Harish Johnson MD    calcium carbonate (TUMS) chewable tablet 500 mg (200 mg elemental) 2 tablet 3 Times Daily PRN 2/7/2019     Sig - Route: Chew 1,000 mg 3 (Three) Times a Day As Needed for Heartburn. - Oral    Cosign for Ordering: Required by Harish Johnson MD    docusate sodium (COLACE) capsule 100 mg 100 mg 2 Times Daily 2/8/2019     Sig - Route: Take 1 capsule by mouth 2 (Two) Times a Day. - Oral    Cosign for Ordering: Required by Harish Johnson MD    hydrocortisone (ANUSOL-HC) 2.5 % rectal cream 1 application 1 application As Needed 2/7/2019     Sig - Route: Insert 1 application into the rectum As Needed for Hemorrhoids. - Rectal    Cosign for Ordering: Required by Harish Johnson MD    ibuprofen (ADVIL,MOTRIN) tablet 600 mg 600 mg Every 8 Hours PRN 2/7/2019     Sig - Route: Take 1 tablet by mouth Every 8 (Eight) Hours As Needed for Mild Pain . - Oral    Cosign for Ordering: Required by Harish Johnson MD    lactated ringers bolus 1,000 mL 1,000 mL Once As Needed 2/7/2019     Sig - Route: Infuse 1,000 mL into a venous catheter 1 (One) Time As Needed (prior to epidural). - Intravenous    lactated ringers infusion 125 mL/hr Continuous 2/7/2019     Sig - Route: Infuse 125 mL/hr into a venous  catheter Continuous. - Intravenous    lanolin ointment  Every 1 Hour PRN 2/7/2019     Sig - Route: Apply  topically to the appropriate area as directed Every 1 (One) Hour As Needed for Dry Skin (nipple pain). - Topical    Cosign for Ordering: Required by Harish Johnson MD    magnesium hydroxide (MILK OF MAGNESIA) suspension 2400 mg/10mL 10 mL 10 mL Daily PRN 2/7/2019     Sig - Route: Take 10 mL by mouth Daily As Needed for Constipation. - Oral    Cosign for Ordering: Required by Harish Johnson MD    misoprostol (CYTOTEC) tablet 600 mcg 600 mcg Once 2/8/2019     Sig - Route: Take 3 tablets by mouth 1 (One) Time. - Oral    Cosign for Ordering: Required by Harish Johnson MD    ondansetron (ZOFRAN) injection 4 mg 4 mg Every 6 Hours PRN 2/7/2019     Sig - Route: Infuse 2 mL into a venous catheter Every 6 (Six) Hours As Needed for Nausea or Vomiting. - Intravenous    Cosign for Ordering: Required by Harish Johnson MD    ondansetron (ZOFRAN) tablet 4 mg 4 mg Every 8 Hours PRN 2/7/2019     Sig - Route: Take 1 tablet by mouth Every 8 (Eight) Hours As Needed for Nausea or Vomiting. - Oral    Cosign for Ordering: Required by Harish Johnson MD    Oxytocin-Lactated Ringers (PITOCIN) 20 UNIT/L in lactated Ringer's 1000 mL IVPB  - ADS Override Pull   2/7/2019 2/8/2019    Notes to Pharmacy: TANYA HUDSON: cabinet override    pramoxine-hydrocortisone 1-1 % foam  As Needed 2/7/2019     Sig - Route: Apply  topically to the appropriate area as directed As Needed for Hemorrhoids. - Topical    Cosign for Ordering: Required by Harish Johnson MD    prenatal vitamin 27-0.8 tablet 1 tablet 1 tablet Daily 2/8/2019     Sig - Route: Take 1 tablet by mouth Daily. - Oral    Cosign for Ordering: Required by Harish Johnson MD    sodium chloride 0.9 % flush 1-10 mL 1-10 mL As Needed 2/7/2019     Sig - Route: Infuse 1-10 mL into a venous catheter As Needed for Line Care. - Intravenous    Cosign for Ordering: Required by Harish Johnson  MD    lidocaine PF 1% (XYLOCAINE) injection 5 mL (Discontinued) 5 mL As Needed 2/7/2019 2/7/2019    Sig - Route: Inject 5 mL into the appropriate area of the skin as directed by provider As Needed (IV starts). - Intradermal    Reason for Discontinue: Patient Transfer    Oxytocin-Sodium Chloride (PITOCIN) 30-0.9 UT/500ML-% infusion solution (Discontinued) 2-30 stephane-units/min Titrated 2/7/2019 2/7/2019    Sig - Route: Infuse 0.002-0.03 Units/min into a venous catheter Dose Adjusted By Provider As Needed. - Intravenous    Reason for Discontinue: Patient Transfer    sodium chloride 0.9 % flush 3 mL (Discontinued) 3 mL Every 12 Hours Scheduled 2/7/2019 2/7/2019    Sig - Route: Infuse 3 mL into a venous catheter Every 12 (Twelve) Hours. - Intravenous    Reason for Discontinue: Patient Transfer    sodium chloride 0.9 % flush 3-10 mL (Discontinued) 3-10 mL As Needed 2/7/2019 2/7/2019    Sig - Route: Infuse 3-10 mL into a venous catheter As Needed for Line Care. - Intravenous    Reason for Discontinue: Patient Transfer    terbutaline (BRETHINE) 1 MG/ML injection  - ADS Override Pull (Discontinued)   2/7/2019 2/7/2019    Notes to Pharmacy: A CARTERNATALIE: cabinet override    Reason for Discontinue: Returned to ADS    zolpidem (AMBIEN) tablet 5 mg (Discontinued) 5 mg Nightly PRN 2/7/2019 2/7/2019    Sig - Route: Take 1 tablet by mouth At Night As Needed for Sleep. - Oral    Reason for Discontinue: Patient Transfer          Lab Results (last 72 hours)     Procedure Component Value Units Date/Time    Tissue Pathology Exam [[id number]] Collected:  02/07/19 0759    Specimen:  Tissue from Placenta Updated:  02/08/19 0759    CBC & Differential [[id number]] Collected:  02/08/19 0527    Specimen:  Blood Updated:  02/08/19 0607    Narrative:       The following orders were created for panel order CBC & Differential.  Procedure                               Abnormality         Status                     ---------                                -----------         ------                     CBC Auto Differential[[id number]]        Abnormal            Final result                 Please view results for these tests on the individual orders.    CBC Auto Differential [[id number]]  (Abnormal) Collected:  02/08/19 0527    Specimen:  Blood Updated:  02/08/19 0607     WBC 21.87 10*3/mm3      RBC 3.60 10*6/mm3      Hemoglobin 10.7 g/dL      Hematocrit 31.1 %      MCV 86.4 fL      MCH 29.7 pg      MCHC 34.4 g/dL      RDW 14.0 %      RDW-SD 44.4 fl      MPV 10.2 fL      Platelets 294 10*3/mm3      Neutrophil % 72.9 %      Lymphocyte % 14.3 %      Monocyte % 10.4 %      Eosinophil % 1.4 %      Basophil % 0.1 %      Immature Grans % 0.9 %      Neutrophils, Absolute 15.96 10*3/mm3      Lymphocytes, Absolute 3.12 10*3/mm3      Monocytes, Absolute 2.27 10*3/mm3      Eosinophils, Absolute 0.30 10*3/mm3      Basophils, Absolute 0.03 10*3/mm3      Immature Grans, Absolute 0.19 10*3/mm3     Extra Tubes [[id number]] Collected:  02/07/19 0532    Specimen:  Blood, Venous Line Updated:  02/07/19 0645    Narrative:       The following orders were created for panel order Extra Tubes.  Procedure                               Abnormality         Status                     ---------                               -----------         ------                     Gold Top - SST[[id number]]                                   Final result                 Please view results for these tests on the individual orders.    [facility] - SST [[id number]] Collected:  02/07/19 0532    Specimen:  Blood Updated:  02/07/19 0645     Extra Tube Hold for add-ons.     Comment: Auto resulted.       Urine Drug Screen - [[id number]]  (Normal) Collected:  02/07/19 0532    Specimen:  Urine Updated:  02/07/19 0633     Amphetamine Screen, Urine Negative     Barbiturates Screen, Urine Negative     Benzodiazepine Screen, Urine Negative     Cocaine Screen, Urine Negative     Methadone Screen, Urine Negative     Opiate  Screen Negative     Oxycodone Screen, Urine Negative     THC, Screen, Urine Negative    Narrative:       Negative Thresholds For Drugs Screened in Urine:     Amphetamines          500 ng/ml  Barbiturates          200 ng/ml  Benzodiazepines       200 ng/ml  Cocaine               150 ng/ml  Methadone             300 ng/mL  Opiates               300 ng/mL  Oxycodone             100 ng/mL  THC                   20 ng/mL    The normal value for all drugs tested is negative. This report includes final unconfirmed screening results.  A positive result by this assay can be, at your request, sent to the Reference Lab for confirmation by gas chromatography. Unconfirmed results must not be used for non-medical purposes, such as employment or legal testing. Clinical consideration should be applied to any drug of abuse test result, particularly when unconfirmed results are used.    CBC (No Diff) [[id number]]  (Abnormal) Collected:  19    Specimen:  Blood Updated:  19     WBC 23.25 10*3/mm3      RBC 4.17 10*6/mm3      Hemoglobin 12.4 g/dL      Hematocrit 35.8 %      MCV 85.9 fL      MCH 29.7 pg      MCHC 34.6 g/dL      RDW 14.0 %      RDW-SD 43.8 fl      MPV 9.9 fL      Platelets 359 10*3/mm3              Operative/Procedure Notes (last 72 hours) (Notes from 2019  9:13 AM through 2019  9:13 AM)      Harish Johnson MD at 2019 10:32 PM          [facility]  Vaginal Delivery Note    Delivery     Delivery: , Spontaneous     YOB: 2017    Time of Birth: 7:56 PM      Anesthesia: Epidural     Delivering clinician:  Harish Odonnell      Delivery narrative: Patient progressed rapidly during the second stage of labor with encouragement by the staff family and myself.  She was having deep variable decelerations category 2 strip and it was felt that it would be most expeditious to deliver the baby and in the baby's best interest via spontaneous vaginal delivery  if possible but arrangements were made for operative vaginal delivery if needed and  section if needed.  Patient progressed well delivered without episiotomy shoulders came without difficulty.  Infant placed on maternal abdomen for Kangaroo care.  Cord doubly clamped.  Cut by family member at maternal request.  Segment of cord obtained for gases.  Placenta delivered spontaneously appeared intact small second-degree laceration repaired    Complications  Intrauterine uterine growth restriction    Infant    Findings: male  infant     Infant observations: Weight: 2670 g (5 lb 14.2 oz)   Length: 18.504  in  Observations/Comments:         Apgars: 8   @ 1 minute /    9   @ 5 minutes     Placenta, Cord, and Fluid    Placenta delivered  Spontaneous  at   2019  8:01 PM     Cord: 3 vessels  present.   Nuchal Cord?  no   Cord blood obtained: Yes    Cord gases obtained:  Yes      Repair    Episiotomy: Not recorded    Lacerations: Yes  Laceration Information  Laceration Repaired?   Perineal: 2nd       Periurethral:         Labial:         Sulcus:         Vaginal:         Cervical:           Suture used for repair: 2-0 Vicryl   Estimated Blood Loss:       Suture used for repair: 2-0 Vicryl and 2-0 Vicryl     Disposition  Mother to Mother Baby/Postpartum  in stable condition currently.  Baby to NBN  in stable condition currently.              This document has been electronically signed by Harish Johnson MD on 2019 10:33 PM            Electronically signed by Harish Johnson MD at 2019 10:40 PM          Physician Progress Notes (last 72 hours) (Notes from 2019  9:13 AM through 2019  9:13 AM)      Rashaad Odonnell MD at 2019  6:57 AM          [facility] Josefina [place]cadia  : 1990  MRN: [id number]  CSN: [id number]    Postpartum Day #1  Subjective   The patient has no complaints. No abdominal pain. Pain well controlled. Lochia minimal; passed one blood clot 1 teaspoon  in volume. Ambulating well. Breast feeding. Would like to go home if infant cleared for discharge.     Objective     Min/max vitals past 24 hours:   Temp  Min: 98 °F (36.7 °C)  Max: 98.8 °F (37.1 °C)  BP  Min: 88/52  Max: 127/85  Pulse  Min: 76  Max: 118  Pulse  Min: 76  Max: 118        Abdomen: soft, non-tender; bowel sounds normal; no masses   fundus firm and non-tender   Calves: Nontender, no cords palpable   Pelvic: deferred     Lab Results   Component Value Date    WBC 21.87 (H) 2019    HGB 10.7 (L) 2019    HCT 31.1 (L) 2019    MCV 86.4 2019     2019    RH Positive 2019       Assessment   1. Postpartum Day #1 S/P vaginal delivery    Plan   1. Continue routine postpartum care        Rashaad Odonnell M.D. PGY1  [facility]  [address] [place] [address]  Office: [phone]    This document has been electronically signed by Rashaad Odonnell MD on 2019 6:57 AM                Electronically signed by Rashaad Odonnell MD at 2019  6:58 AM     Harish Johnson MD at 2019  7:47 AM        Patient bradycardia after going to the bathroom.  Had had one earlier.  Responded to intrauterine resuscitator.  AROM clear internal leads placed.  We will plan to have epidural placed for analgesia and encased  section needed in plan to observe to see if amniotomy places in full labor.  I have case in OR patient asleep have discussed with Dr. Booker who agrees to cover.  Patient informed    Electronically signed by Harish Johnson MD at 2019  7:49 AM

## 2019-02-08 NOTE — L&D DELIVERY NOTE
[facility]  Vaginal Delivery Note    Delivery     Delivery: , Spontaneous     YOB: 2017    Time of Birth: 7:56 PM      Anesthesia: Epidural     Delivering clinician:  Harish Odonnell      Delivery narrative: Patient progressed rapidly during the second stage of labor with encouragement by the staff family and myself.  She was having deep variable decelerations category 2 strip and it was felt that it would be most expeditious to deliver the baby and in the baby's best interest via spontaneous vaginal delivery if possible but arrangements were made for operative vaginal delivery if needed and  section if needed.  Patient progressed well delivered without episiotomy shoulders came without difficulty.  Infant placed on maternal abdomen for Kangaroo care.  Cord doubly clamped.  Cut by family member at maternal request.  Segment of cord obtained for gases.  Placenta delivered spontaneously appeared intact small second-degree laceration repaired    Complications  Intrauterine uterine growth restriction    Infant    Findings: male  infant     Infant observations: Weight: 2670 g (5 lb 14.2 oz)   Length: 18.504  in  Observations/Comments:         Apgars: 8   @ 1 minute /    9   @ 5 minutes     Placenta, Cord, and Fluid    Placenta delivered  Spontaneous  at   2019  8:01 PM     Cord: 3 vessels  present.   Nuchal Cord?  no   Cord blood obtained: Yes    Cord gases obtained:  Yes      Repair    Episiotomy: Not recorded    Lacerations: Yes  Laceration Information  Laceration Repaired?   Perineal: 2nd       Periurethral:         Labial:         Sulcus:         Vaginal:         Cervical:           Suture used for repair: 2-0 Vicryl   Estimated Blood Loss:       Suture used for repair: 2-0 Vicryl and 2-0 Vicryl     Disposition  Mother to Mother Baby/Postpartum  in stable condition currently.  Baby to NBN  in stable condition currently.              This document has been  electronically signed by Harish Johnson MD on February 7, 2019 10:33 PM

## 2019-02-09 VITALS
WEIGHT: 150 LBS | OXYGEN SATURATION: 99 % | TEMPERATURE: 97.8 F | BODY MASS INDEX: 26.15 KG/M2 | RESPIRATION RATE: 18 BRPM | HEART RATE: 88 BPM | SYSTOLIC BLOOD PRESSURE: 122 MMHG | DIASTOLIC BLOOD PRESSURE: 87 MMHG

## 2019-02-09 PROCEDURE — 25010000002 KETOROLAC TROMETHAMINE PER 15 MG: Performed by: STUDENT IN AN ORGANIZED HEALTH CARE EDUCATION/TRAINING PROGRAM

## 2019-02-09 RX ORDER — HYDROCODONE BITARTRATE AND ACETAMINOPHEN 7.5; 325 MG/1; MG/1
1 TABLET ORAL EVERY 6 HOURS PRN
Qty: 20 TABLET | Refills: 0 | Status: SHIPPED | OUTPATIENT
Start: 2019-02-09 | End: 2019-02-19

## 2019-02-09 RX ORDER — KETOROLAC TROMETHAMINE 30 MG/ML
60 INJECTION, SOLUTION INTRAMUSCULAR; INTRAVENOUS EVERY 6 HOURS PRN
Status: DISCONTINUED | OUTPATIENT
Start: 2019-02-09 | End: 2019-02-09 | Stop reason: HOSPADM

## 2019-02-09 RX ADMIN — KETOROLAC TROMETHAMINE 60 MG: 60 INJECTION, SOLUTION INTRAMUSCULAR at 10:19

## 2019-02-09 RX ADMIN — DOCUSATE SODIUM 100 MG: 100 CAPSULE, LIQUID FILLED ORAL at 09:16

## 2019-02-09 RX ADMIN — PRENATAL VIT W/ FE FUMARATE-FA TAB 27-0.8 MG 1 TABLET: 27-0.8 TAB at 09:16

## 2019-02-09 NOTE — PLAN OF CARE
Problem: Patient Care Overview  Goal: Plan of Care Review  Outcome: Outcome(s) achieved Date Met: 02/09/19    Goal: Individualization and Mutuality  Outcome: Outcome(s) achieved Date Met: 02/09/19    Goal: Discharge Needs Assessment  Outcome: Outcome(s) achieved Date Met: 02/09/19    Goal: Interprofessional Rounds/Family Conf  Outcome: Outcome(s) achieved Date Met: 02/09/19      Problem: Postpartum (Vaginal Delivery) (Adult,Obstetrics,Pediatric)  Goal: Signs and Symptoms of Listed Potential Problems Will be Absent, Minimized or Managed (Postpartum)  Outcome: Outcome(s) achieved Date Met: 02/09/19

## 2019-02-09 NOTE — PROGRESS NOTES
Gagan Alas  : 1990  MRN: [id number]  CSN: [id number]    Postpartum Day #2  Subjective   The patient has no complaints. She is tolerating regular diet, ambulating, voiding spontaneously. Lochia is minimal. Pain is well controlled with minor exacerbations.       Objective     Min/max vitals past 24 hours:   Temp  Min: 97.9 °F (36.6 °C)  Max: 98.2 °F (36.8 °C)  BP  Min: 100/60  Max: 121/81  Pulse  Min: 73  Max: 89  Pulse  Min: 73  Max: 89        General: Well developed; well nourished.  No acute distress.   Abdomen: Fundus firm and non-tender.   Pelvic: Not performed.   Ext: Calves NT.     Lab Results   Component Value Date    WBC 21.87 (H) 2019    HGB 10.7 (L) 2019    HCT 31.1 (L) 2019    MCV 86.4 2019     2019    RH Positive 2019        Assessment   1. Postpartum Day #2 S/P vaginal delivery     Plan   1. Discharge to home.  2. Advised nothing in the vagina for 6 weeks.  3. Discharge questions all answered.  4. Follow-up appointment in 4 week(s)    This document has been electronically signed by Albertina Clemens MD on 2019 9:16 AM

## 2019-02-09 NOTE — PLAN OF CARE
Problem: Patient Care Overview  Goal: Plan of Care Review  Outcome: Ongoing (interventions implemented as appropriate)   02/09/19 0339   Coping/Psychosocial   Plan of Care Reviewed With patient   OTHER   Outcome Summary vss, fundus firm and bleeding light, pt complains of some pain from cramping while nursing the baby, ambulating and voiding well, breastfeeding on demand, pt will be discharged home today.      Goal: Individualization and Mutuality  Outcome: Ongoing (interventions implemented as appropriate)    Goal: Discharge Needs Assessment  Outcome: Ongoing (interventions implemented as appropriate)    Goal: Interprofessional Rounds/Family Conf  Outcome: Ongoing (interventions implemented as appropriate)      Problem: Postpartum (Vaginal Delivery) (Adult,Obstetrics,Pediatric)  Goal: Signs and Symptoms of Listed Potential Problems Will be Absent, Minimized or Managed (Postpartum)  Outcome: Ongoing (interventions implemented as appropriate)

## 2019-02-10 PROBLEM — Z3A.37 37 WEEKS GESTATION OF PREGNANCY: Status: ACTIVE | Noted: 2019-02-10

## 2019-02-10 PROBLEM — O36.5910 POOR FETAL GROWTH AFFECTING MANAGEMENT OF MOTHER IN FIRST TRIMESTER: Status: ACTIVE | Noted: 2019-02-10

## 2019-02-11 NOTE — PROGRESS NOTES
Complaint here for prenatal care    Patient with intrauterine growth restriction at this point the estimated fetal weight over 10 percentile but abdominal circumference less than 10th discussed timing of delivery in light of this.  We will plan for about 38 weeks

## 2019-02-11 NOTE — PAYOR COMM NOTE
Derik Bass (28 y.o. Female)     Date of Birth Social Security Number Address Home Phone MRN    1990  [address] [phone] [id number]    [employer] Mar[employer] Status          [place] Single       Admission Date Admission Type Admitting Provider Attending Provider Department, Room/Bed    19 Elective Harish Johnson MD  [facility] MOTHER BABY, M760/1    Discharge Date Discharge Disposition Discharge Destination        2019 Home or Self Care              Attending Provider:  (none)   Allergies:  No Known Allergies    Isolation:  None   Infection:  None   Code Status:  Prior    Ht:  --   Wt:  68 kg (150 lb)    Admission Cmt:  None   Principal Problem:  None                Active Insurance as of 2019     Primary Coverage     Payor Plan Insurance Group Employer/Plan Group    [employer] MEDICAID [employer] [id number]     Payor Plan Address Payor Plan Phone Number Payor Plan Fax Number Effective Dates    PO  [phone]  2017 - None Entered    [place] [employer] [id number]       Subscriber Name Subscriber Birth Date Member ID       DERIK BASS 1990 [id number]                 Emergency Contacts      (Rel.) Home Phone Work Phone Mobile Phone    Alexandria Bass (Mother) [phone] -- [phone]               Discharge Summary      Harish Johnson MD at 2019  7:45 AM          [facility]  Derik Bass  : 1990  MRN: [id number]  CSN: [id number]    Discharge Summary:    Date of Admission: 2019  Date of Discharge:  2019    Admitting Diagnosis:  1. IUP @ 38w0d  2. for induction of labor     Discharge Diagnosis:  1. S/P        History and Hospital Course:  Patient is a   who presents for induction of labor.  Her pregnancy was complicated by intrauterine growth restriction.  .       She was admitted and progressed in labor with pitocin augmentation and epidural analgesia to  completely dilated. She had a vaginal delivery of a  viable male   infant who weighed 2670 g (5 lb 14.2 oz)  and APGARs of        APGARS  One minute Five minutes Ten minutes Fifteen minutes Twenty minutes   Skin color: 0   1             Heart rate: 2   2             Grimace: 2   2              Muscle tone: 2   2              Breathin   2              Totals: 8   9              . No immediate complications were encountered.      Her postpartum course has been unremarkable. She had no signs or symptoms of acute blood loss anemia. She was ambulating well, voiding without difficulty and lochia was within normal limits. She was breast feeding without difficulty. She was stable for discharge on postpartum day 1.      Precautions and instructions were discussed with her including but not limited to maintaining a regular diet at home, practicing local hygiene, pelvic rest, and signs and symptoms to report including heavy bleeding, frequent passage of clots, fainting or dizziness, foul odor of lochia, increasing pain, fever, or any other concerns.    She was asked to follow up in the office in 2 weeks.      Discharge Diet: Regular  Discharge Activity: As tolerated. Vaginal rest 6 weeks.  Discharge Medications:     Discharge Medications      ASK your doctor about these medications      Instructions Start Date   HYDROcodone-acetaminophen 5-325 MG per tablet  Commonly known as:  NORCO   1 tablet, Oral, Every 4 Hours PRN      VENTOLIN  (90 Base) MCG/ACT inhaler  Generic drug:  albuterol sulfate HFA   2 puffs, Inhalation, Every 6 Hours PRN           Patient will restart all home medications including prenatal vitamins.    Disposition to home in stable condition        Rashaad Odonnell M.D. PGY1  [facility]  [address] [place] [address]  Office: [phone]    This document has been electronically signed by Rashaad Odonnell MD on 2019 7:49  AM      I have seen and evaluated the patient.  I have discussed the case with the resident. I have reviewed the notes, assessment and plan, and/or procedures performed by the resident. I concur with the resident’s documentation.  norco  5 dc and prescription norco 7.5 # 20 1 q 4-6 given as says ibuprofen inadequate alone       This document has been electronically signed by Harish Johnson MD on February 8, 2019 3:13 PM        Electronically signed by Harish Johnson MD at 2/9/2019  8:30 AM

## 2019-02-15 NOTE — ADDENDUM NOTE
Addendum  created 02/15/19 0809 by Harish Cortes MD    Intraprocedure Flowsheets edited, Intraprocedure Staff edited

## 2019-02-19 DIAGNOSIS — O36.5930 POOR FETAL GROWTH AFFECTING MANAGEMENT OF MOTHER IN THIRD TRIMESTER, SINGLE OR UNSPECIFIED FETUS: Primary | ICD-10-CM

## 2019-02-19 LAB
LAB AP CASE REPORT: NORMAL
PATH REPORT.FINAL DX SPEC: NORMAL
PATH REPORT.GROSS SPEC: NORMAL

## 2019-02-27 ENCOUNTER — OFFICE VISIT (OUTPATIENT)
Dept: OBSTETRICS AND GYNECOLOGY | Facility: CLINIC | Age: 29
End: 2019-02-27

## 2019-02-27 VITALS
WEIGHT: 133.4 LBS | DIASTOLIC BLOOD PRESSURE: 70 MMHG | HEIGHT: 62 IN | BODY MASS INDEX: 24.55 KG/M2 | SYSTOLIC BLOOD PRESSURE: 112 MMHG

## 2019-02-27 PROCEDURE — 99024 POSTOP FOLLOW-UP VISIT: CPT | Performed by: OBSTETRICS & GYNECOLOGY

## 2019-03-03 NOTE — PROGRESS NOTES
Sebastian Alas is a 28 y.o. y/o female.     Chief Complaint: Postpartum    HPI:   28 y.o. y/o .  No LMP recorded..  Patient's postpartum doing well is having problems with hemorrhoids but is responding to hemorrhoid cream          Review of Systems   ROS:  CNS: No history of brain malignancy  HEENT: No history of throat cancer  Eye: No history of retinal cancer  Pulmonary: No history of lung cancer                                                                                 Cardiovascular: No history of cardiac tumors  Gastrointestinal: No history of small bowel tumors  Renal: No history of kidney  Musculoskeletal: No history of smooth muscle tumors  Lymphatics: No history of of Hodgkin's disease  Endocrine: No history of thyroid malignancy    The following portions of the patient's history were reviewed and updated as appropriate: allergies, current medications, past family history, past medical history, past social history, past surgical history and problem list.    No Known Allergies     Outpatient Medications Prior to Visit   Medication Sig Dispense Refill   • albuterol sulfate HFA (VENTOLIN HFA) 108 (90 Base) MCG/ACT inhaler Inhale 2 puffs Every 6 (Six) Hours As Needed.     • benzocaine (AMERICAINE) 20 % rectal ointment Insert 1 application into the rectum As Needed for Hemorrhoids. 1 each 0   • hydrocortisone (ANUSOL-HC) 2.5 % rectal cream Insert 1 application into the rectum As Needed for Hemorrhoids. 1 each 0   • PRENATAL 28-0.8 MG tablet Take 1 tablet by mouth Daily. 30 each 0     No facility-administered medications prior to visit.         The patient has a family history of   Family History   Problem Relation Age of Onset   • Colon cancer Father    • Coronary artery disease Father    • Hypertension Father    • Kidney disease Father    • Thyroid disease Sister    • Coronary artery disease Paternal Grandfather    • Coronary artery disease Maternal Grandmother    • Coronary artery disease  Maternal Grandfather         Past Medical History:   Diagnosis Date   • Abnormal Pap smear of cervix    • Anxiety    • Asthma    • Cervical dysplasia    • Cyst of ovary    • Palpitations    • Tobacco dependence syndrome    • URI (upper respiratory infection)         OB History      Para Term  AB Living    4 2 1 1 2 2    SAB TAB Ectopic Molar Multiple Live Births    2       0 2           Social History     Socioeconomic History   • Marital status: Single     Spouse name: Not on file   • Number of children: Not on file   • Years of education: Not on file   • Highest education level: Not on file   Social Needs   • Financial resource strain: Not on file   • Food insecurity - worry: Not on file   • Food insecurity - inability: Not on file   • Transportation needs - medical: Not on file   • Transportation needs - non-medical: Not on file   Occupational History   • Not on file   Tobacco Use   • Smoking status: Current Every Day Smoker     Packs/day: 1.00     Types: Cigarettes   • Smokeless tobacco: Never Used   Substance and Sexual Activity   • Alcohol use: No   • Drug use: No   • Sexual activity: Not Currently     Partners: Male     Birth control/protection: None   Other Topics Concern   • Not on file   Social History Narrative   • Not on file        Past Surgical History:   Procedure Laterality Date   • CERVICAL BIOPSY  W/ LOOP ELECTRODE EXCISION     • ENDOSCOPY  2012    Normal esophagus. Gastritis in stomach. Biopsy taken. Normal duodenum. Biopsy taken.   • ENDOSCOPY AND COLONOSCOPY  2012    Colitis in rectum. Biopsy taken.        Patient Active Problem List   Diagnosis   • Positive fetal fibronectin at 22 weeks to 34 weeks gestation   • History of  delivery   • Poor fetal growth affecting management of mother in third trimester   •  contractions   • Poor fetal growth affecting management of mother in first trimester   • 37 weeks gestation of pregnancy        Documented Vitals     "02/27/19 1336   BP: 112/70   Weight: 60.5 kg (133 lb 6.4 oz)   Height: 157.5 cm (62\")   PainSc: 0-No pain        Body mass index is 24.4 kg/m².    Physical Exam  Constitutional: Appears to be in no acute distress; Eyes: sclera normal; Endocrine system: thyroid palpate is normal; Pulmonary system: lungs clear; Cardiovascular system: heart regular rate and rhythm; Gastrointestinal system: abdomen soft nontender, active bowel sounds; Urologic system: CVA negative; Psychiatric: appropriate insight; Neurologic: gait within normal limits    Assessment        Diagnosis Plan   1. Postpartum state           Plan       No orders of the defined types were placed in this encounter.    1. Discussed contraceptive options including Lark and permanent sterilization        This document has been electronically signed by Harish Johnson MD on March 3, 2019 5:19 PM    "

## 2019-04-10 DIAGNOSIS — O36.5930 POOR FETAL GROWTH AFFECTING MANAGEMENT OF MOTHER IN THIRD TRIMESTER, SINGLE OR UNSPECIFIED FETUS: ICD-10-CM

## 2019-12-12 PROBLEM — B97.89 VIRAL RESPIRATORY ILLNESS: Status: ACTIVE | Noted: 2019-12-12

## 2019-12-12 PROBLEM — J98.8 VIRAL RESPIRATORY ILLNESS: Status: ACTIVE | Noted: 2019-12-12

## 2020-08-28 ENCOUNTER — TELEPHONE (OUTPATIENT)
Dept: OBSTETRICS AND GYNECOLOGY | Facility: CLINIC | Age: 30
End: 2020-08-28

## 2020-08-31 ENCOUNTER — INITIAL PRENATAL (OUTPATIENT)
Dept: OBSTETRICS AND GYNECOLOGY | Facility: CLINIC | Age: 30
End: 2020-08-31

## 2020-08-31 ENCOUNTER — LAB (OUTPATIENT)
Dept: LAB | Facility: HOSPITAL | Age: 30
End: 2020-08-31

## 2020-08-31 VITALS — DIASTOLIC BLOOD PRESSURE: 72 MMHG | BODY MASS INDEX: 19.9 KG/M2 | SYSTOLIC BLOOD PRESSURE: 102 MMHG | WEIGHT: 108.8 LBS

## 2020-08-31 DIAGNOSIS — O99.331 HIGH RISK PREGNANCY DUE TO SMOKING IN FIRST TRIMESTER: ICD-10-CM

## 2020-08-31 DIAGNOSIS — R82.5 POSITIVE URINE DRUG SCREEN: Primary | ICD-10-CM

## 2020-08-31 DIAGNOSIS — O34.41 HISTORY OF LOOP ELECTROSURGICAL EXCISION PROCEDURE (LEEP) OF CERVIX AFFECTING PREGNANCY IN FIRST TRIMESTER: ICD-10-CM

## 2020-08-31 DIAGNOSIS — Z34.80 SUPERVISION OF OTHER NORMAL PREGNANCY: Primary | ICD-10-CM

## 2020-08-31 DIAGNOSIS — Z87.51 HISTORY OF PRETERM DELIVERY: ICD-10-CM

## 2020-08-31 DIAGNOSIS — Z32.00 PREGNANCY EXAMINATION OR TEST, PREGNANCY UNCONFIRMED: ICD-10-CM

## 2020-08-31 DIAGNOSIS — Z87.42 HISTORY OF ABNORMAL CERVICAL PAP SMEAR: ICD-10-CM

## 2020-08-31 DIAGNOSIS — Z3A.08 8 WEEKS GESTATION OF PREGNANCY: ICD-10-CM

## 2020-08-31 DIAGNOSIS — Z83.49 FAMILY HISTORY OF THYROID DISORDER: ICD-10-CM

## 2020-08-31 DIAGNOSIS — Z98.890 HISTORY OF LOOP ELECTROSURGICAL EXCISION PROCEDURE (LEEP) OF CERVIX AFFECTING PREGNANCY IN FIRST TRIMESTER: ICD-10-CM

## 2020-08-31 PROBLEM — O36.5910 POOR FETAL GROWTH AFFECTING MANAGEMENT OF MOTHER IN FIRST TRIMESTER: Status: RESOLVED | Noted: 2019-02-10 | Resolved: 2020-08-31

## 2020-08-31 PROBLEM — O47.00 PRETERM CONTRACTIONS: Status: RESOLVED | Noted: 2019-01-14 | Resolved: 2020-08-31

## 2020-08-31 PROBLEM — Z3A.37 37 WEEKS GESTATION OF PREGNANCY: Status: RESOLVED | Noted: 2019-02-10 | Resolved: 2020-08-31

## 2020-08-31 PROBLEM — R87.89 POSITIVE FETAL FIBRONECTIN AT 22 WEEKS TO 34 WEEKS GESTATION: Status: RESOLVED | Noted: 2018-12-27 | Resolved: 2020-08-31

## 2020-08-31 PROBLEM — O36.5930 POOR FETAL GROWTH AFFECTING MANAGEMENT OF MOTHER IN THIRD TRIMESTER: Status: RESOLVED | Noted: 2019-01-14 | Resolved: 2020-08-31

## 2020-08-31 PROBLEM — O09.899 POSITIVE FETAL FIBRONECTIN AT 22 WEEKS TO 34 WEEKS GESTATION: Status: RESOLVED | Noted: 2018-12-27 | Resolved: 2020-08-31

## 2020-08-31 LAB
ABO GROUP BLD: NORMAL
AMPHET+METHAMPHET UR QL: NEGATIVE
AMPHETAMINES UR QL: POSITIVE
B-HCG UR QL: POSITIVE
BARBITURATES UR QL SCN: NEGATIVE
BASOPHILS # BLD AUTO: 0.07 10*3/MM3 (ref 0–0.2)
BASOPHILS NFR BLD AUTO: 0.6 % (ref 0–1.5)
BENZODIAZ UR QL SCN: NEGATIVE
BILIRUB UR QL STRIP: NEGATIVE
BLD GP AB SCN SERPL QL: NEGATIVE
BUPRENORPHINE SERPL-MCNC: NEGATIVE NG/ML
CANNABINOIDS SERPL QL: NEGATIVE
CLARITY UR: ABNORMAL
COCAINE UR QL: NEGATIVE
COLOR UR: YELLOW
DEPRECATED RDW RBC AUTO: 39.8 FL (ref 37–54)
EOSINOPHIL # BLD AUTO: 0.28 10*3/MM3 (ref 0–0.4)
EOSINOPHIL NFR BLD AUTO: 2.4 % (ref 0.3–6.2)
ERYTHROCYTE [DISTWIDTH] IN BLOOD BY AUTOMATED COUNT: 12.8 % (ref 12.3–15.4)
GLUCOSE UR STRIP-MCNC: NEGATIVE MG/DL
HBV SURFACE AG SERPL QL IA: NORMAL
HCT VFR BLD AUTO: 42.7 % (ref 34–46.6)
HCV AB SER DONR QL: NORMAL
HGB BLD-MCNC: 14.8 G/DL (ref 12–15.9)
HGB UR QL STRIP.AUTO: NEGATIVE
HIV1+2 AB SER QL: NORMAL
IMM GRANULOCYTES # BLD AUTO: 0.06 10*3/MM3 (ref 0–0.05)
IMM GRANULOCYTES NFR BLD AUTO: 0.5 % (ref 0–0.5)
INTERNAL NEGATIVE CONTROL: NEGATIVE
INTERNAL POSITIVE CONTROL: POSITIVE
KETONES UR QL STRIP: NEGATIVE
LEUKOCYTE ESTERASE UR QL STRIP.AUTO: ABNORMAL
LYMPHOCYTES # BLD AUTO: 2.93 10*3/MM3 (ref 0.7–3.1)
LYMPHOCYTES NFR BLD AUTO: 24.8 % (ref 19.6–45.3)
Lab: ABNORMAL
Lab: NORMAL
MCH RBC QN AUTO: 30 PG (ref 26.6–33)
MCHC RBC AUTO-ENTMCNC: 34.7 G/DL (ref 31.5–35.7)
MCV RBC AUTO: 86.6 FL (ref 79–97)
METHADONE UR QL SCN: NEGATIVE
MONOCYTES # BLD AUTO: 0.75 10*3/MM3 (ref 0.1–0.9)
MONOCYTES NFR BLD AUTO: 6.3 % (ref 5–12)
NEUTROPHILS NFR BLD AUTO: 65.4 % (ref 42.7–76)
NEUTROPHILS NFR BLD AUTO: 7.74 10*3/MM3 (ref 1.7–7)
NITRITE UR QL STRIP: NEGATIVE
NRBC BLD AUTO-RTO: 0 /100 WBC (ref 0–0.2)
OPIATES UR QL: NEGATIVE
OXYCODONE UR QL SCN: NEGATIVE
PCP UR QL SCN: NEGATIVE
PH UR STRIP.AUTO: 5.5 [PH] (ref 5–8)
PLATELET # BLD AUTO: 373 10*3/MM3 (ref 140–450)
PMV BLD AUTO: 10.9 FL (ref 6–12)
PROPOXYPH UR QL: NEGATIVE
PROT UR QL STRIP: ABNORMAL
RBC # BLD AUTO: 4.93 10*6/MM3 (ref 3.77–5.28)
RH BLD: POSITIVE
RPR SER QL: NORMAL
RUBV IGG SERPL IA-ACNC: NEGATIVE
SP GR UR STRIP: 1.03 (ref 1–1.03)
T4 FREE SERPL-MCNC: 1.54 NG/DL (ref 0.93–1.7)
TRICYCLICS UR QL SCN: NEGATIVE
TSH SERPL DL<=0.05 MIU/L-ACNC: 0.52 UIU/ML (ref 0.27–4.2)
UROBILINOGEN UR QL STRIP: ABNORMAL
WBC # BLD AUTO: 11.83 10*3/MM3 (ref 3.4–10.8)

## 2020-08-31 PROCEDURE — 86803 HEPATITIS C AB TEST: CPT | Performed by: OBSTETRICS & GYNECOLOGY

## 2020-08-31 PROCEDURE — 84439 ASSAY OF FREE THYROXINE: CPT | Performed by: OBSTETRICS & GYNECOLOGY

## 2020-08-31 PROCEDURE — 87086 URINE CULTURE/COLONY COUNT: CPT | Performed by: OBSTETRICS & GYNECOLOGY

## 2020-08-31 PROCEDURE — 80306 DRUG TEST PRSMV INSTRMNT: CPT | Performed by: OBSTETRICS & GYNECOLOGY

## 2020-08-31 PROCEDURE — 81003 URINALYSIS AUTO W/O SCOPE: CPT | Performed by: OBSTETRICS & GYNECOLOGY

## 2020-08-31 PROCEDURE — G0480 DRUG TEST DEF 1-7 CLASSES: HCPCS

## 2020-08-31 PROCEDURE — 80081 OBSTETRIC PANEL INC HIV TSTG: CPT | Performed by: OBSTETRICS & GYNECOLOGY

## 2020-08-31 PROCEDURE — 87661 TRICHOMONAS VAGINALIS AMPLIF: CPT | Performed by: OBSTETRICS & GYNECOLOGY

## 2020-08-31 PROCEDURE — 87591 N.GONORRHOEAE DNA AMP PROB: CPT | Performed by: OBSTETRICS & GYNECOLOGY

## 2020-08-31 PROCEDURE — 87491 CHLMYD TRACH DNA AMP PROBE: CPT | Performed by: OBSTETRICS & GYNECOLOGY

## 2020-08-31 PROCEDURE — 84443 ASSAY THYROID STIM HORMONE: CPT | Performed by: OBSTETRICS & GYNECOLOGY

## 2020-08-31 PROCEDURE — 36415 COLL VENOUS BLD VENIPUNCTURE: CPT

## 2020-08-31 PROCEDURE — 81025 URINE PREGNANCY TEST: CPT | Performed by: OBSTETRICS & GYNECOLOGY

## 2020-08-31 PROCEDURE — 99213 OFFICE O/P EST LOW 20 MIN: CPT | Performed by: OBSTETRICS & GYNECOLOGY

## 2020-08-31 NOTE — PROGRESS NOTES
I spent approximately 45 minutes with the patient acquiring the health and history intake and discussing topics related to healthy lifestyle. Her LMP is 7/9/20. This is her 5th pregnancy. She has had 2 miscarriages and 2 vaginal deliveries. A newob bag is given. The 1st trimester teaching was done with the patient. We discussed a healthy diet and exercise and what is recommended. I also discussed Listeriosis and Toxoplasmosis and what fish to avoid due to high mercury levels. I informed patient not to be in hot tubs, saunas, or tanning beds. We discussed that spotting may occur after intercourse which is common, but if heavy bleeding like a period occurs to call the [facility] or hospital if clinic is closed.  I instructed the patient that alcohol, illicit drug use, and tobacco smoking are not recommended in pregnancy.  She says that she smokes about 1 pack of cigarettes per day. She says she plans to quit. I encouraged her not to be around any secondhand smoke also. She plans to breastfeed.  She says she  her other 2 children. I gave her pamphlet on breastfeeding classes and the breastfeeding mothers support group. These services are provided by Angie Dowling, Lactation Consultant. She filled out the health department referral form and depression screening questionnaire. I encouraged the patient to get the TDAP vaccine in the 3rd trimester.   I discussed with the patient that a pediatrician needs to be chosen prior to delivery for the infant to have an appointment scheduled before leaving the hospital.  She says she has a pediatrician in [place].  I discussed lab tests will be done today. There is a family history of thyroid disorder. A TSH and a Free T4 are also ordered. She has history of abnormal pap smears, and a LEEP procedure was done. Her last pap smear was done by Dr. Johnson in [place]. She signed a release for us to get the pap smear results. All questions were answered at this time. She is  seeing Dr. Johnson today.

## 2020-09-01 PROBLEM — Z98.890 HISTORY OF LEEP (LOOP ELECTROSURGICAL EXCISION PROCEDURE) OF CERVIX COMPLICATING PREGNANCY: Status: ACTIVE | Noted: 2020-09-01

## 2020-09-01 PROBLEM — O34.40 HISTORY OF LEEP (LOOP ELECTROSURGICAL EXCISION PROCEDURE) OF CERVIX COMPLICATING PREGNANCY: Status: ACTIVE | Noted: 2020-09-01

## 2020-09-01 PROBLEM — Z3A.08 8 WEEKS GESTATION OF PREGNANCY: Status: ACTIVE | Noted: 2020-09-01

## 2020-09-01 PROBLEM — O99.331 HIGH RISK PREGNANCY DUE TO SMOKING IN FIRST TRIMESTER: Status: ACTIVE | Noted: 2020-09-01

## 2020-09-01 PROBLEM — Z87.42 HISTORY OF ABNORMAL CERVICAL PAP SMEAR: Status: ACTIVE | Noted: 2020-09-01

## 2020-09-01 PROBLEM — R82.5 POSITIVE URINE DRUG SCREEN: Status: ACTIVE | Noted: 2020-09-01

## 2020-09-01 LAB
BACTERIA SPEC AEROBE CULT: NO GROWTH
HOLD SPECIMEN: NORMAL

## 2020-09-02 DIAGNOSIS — Z36.87 ENCOUNTER FOR ANTENATAL SCREENING FOR UNCERTAIN DATES: Primary | ICD-10-CM

## 2020-09-02 LAB
AMPHETAMINES UR QL: NEGATIVE
C TRACH RRNA CVX QL NAA+PROBE: NOT DETECTED
Lab: NORMAL
N GONORRHOEA RRNA SPEC QL NAA+PROBE: NOT DETECTED
TRICHOMONAS VAGINALIS PCR: NOT DETECTED

## 2020-09-16 DIAGNOSIS — Z36.87 ENCOUNTER FOR ANTENATAL SCREENING FOR UNCERTAIN DATES: ICD-10-CM

## 2020-09-28 ENCOUNTER — ROUTINE PRENATAL (OUTPATIENT)
Dept: OBSTETRICS AND GYNECOLOGY | Facility: CLINIC | Age: 30
End: 2020-09-28

## 2020-09-28 ENCOUNTER — LAB (OUTPATIENT)
Dept: LAB | Facility: HOSPITAL | Age: 30
End: 2020-09-28

## 2020-09-28 VITALS — DIASTOLIC BLOOD PRESSURE: 68 MMHG | SYSTOLIC BLOOD PRESSURE: 98 MMHG | WEIGHT: 111.2 LBS | BODY MASS INDEX: 20.34 KG/M2

## 2020-09-28 DIAGNOSIS — O34.41 HISTORY OF LOOP ELECTROSURGICAL EXCISION PROCEDURE (LEEP) OF CERVIX AFFECTING PREGNANCY IN FIRST TRIMESTER: ICD-10-CM

## 2020-09-28 DIAGNOSIS — O36.5911: ICD-10-CM

## 2020-09-28 DIAGNOSIS — Z87.51 HISTORY OF PRETERM DELIVERY: ICD-10-CM

## 2020-09-28 DIAGNOSIS — Z98.890 HISTORY OF LOOP ELECTROSURGICAL EXCISION PROCEDURE (LEEP) OF CERVIX AFFECTING PREGNANCY IN FIRST TRIMESTER: ICD-10-CM

## 2020-09-28 DIAGNOSIS — Z3A.11 11 WEEKS GESTATION OF PREGNANCY: ICD-10-CM

## 2020-09-28 DIAGNOSIS — O99.331 HIGH RISK PREGNANCY DUE TO SMOKING IN FIRST TRIMESTER: Primary | ICD-10-CM

## 2020-09-28 DIAGNOSIS — Z87.42 HISTORY OF ABNORMAL CERVICAL PAP SMEAR: ICD-10-CM

## 2020-09-28 DIAGNOSIS — R82.5 POSITIVE URINE DRUG SCREEN: ICD-10-CM

## 2020-09-28 PROCEDURE — G0480 DRUG TEST DEF 1-7 CLASSES: HCPCS

## 2020-09-28 PROCEDURE — 99213 OFFICE O/P EST LOW 20 MIN: CPT | Performed by: OBSTETRICS & GYNECOLOGY

## 2020-09-28 NOTE — PROGRESS NOTES
CC: Prenatal visit    Sebastian Alas is a 29 y.o.  at 11w4d.  Doing well.  Denies contractions, LOF, or VB.  Reports good FM.    BP 98/68   Wt 50.4 kg (111 lb 3.2 oz)   LMP 2020 (Exact Date)   BMI 20.34 kg/m²   SVE: Not done     Fetal Heart Rate: us     Problems (from 20 to present)     Problem Noted Resolved    History of abnormal cervical Pap smear 2020 by Harish Johnson MD No    Priority:  High      High risk pregnancy due to smoking in first trimester 2020 by Harish Johnson MD No    Priority:  High      History of LEEP (loop electrosurgical excision procedure) of cervix complicating pregnancy 2020 by Harish Johnson MD No    Priority:  High            A/P: Sebastian Alas is a 29 y.o.  at 11w4d.  - RTC in 3 weeks  - Reviewed COVID-19 visitation policy  - Reviewed COVID-19 precautions     Diagnosis Plan   1. High risk pregnancy due to smoking in first trimester  INVITAE NIPS   2. History of abnormal cervical Pap smear     3. History of loop electrosurgical excision procedure (LEEP) of cervix affecting pregnancy in first trimester     4. Poor fetal growth affecting management of mother in first trimester, fetus 1     5. 11 weeks gestation of pregnancy     6. History of  delivery   discussed 17 hydroxyprogesterone with patient other alternative such as vaginal progesterone.  Wants to use 17 hydroxyprogesterone which she is used in the past.  Recent data which calls into question is effectiveness discussed again my opinion that there probably is some effectiveness but is not nearly 100%     Harish Johnson MD  2020  17:01 CDT

## 2020-10-02 LAB
AMPHET UR CFM-MCNC: >3000 NG/ML
AMPHET UR QL CFM: POSITIVE
AMPHETAMINES UR QL: POSITIVE
LABORATORY COMMENT REPORT: ABNORMAL
METHAMPHET UR CFM-MCNC: >3000 NG/ML
METHAMPHET UR QL CFM: POSITIVE

## 2020-10-14 DIAGNOSIS — O99.331 HIGH RISK PREGNANCY DUE TO SMOKING IN FIRST TRIMESTER: ICD-10-CM

## 2020-10-19 ENCOUNTER — ROUTINE PRENATAL (OUTPATIENT)
Dept: OBSTETRICS AND GYNECOLOGY | Facility: CLINIC | Age: 30
End: 2020-10-19

## 2020-10-19 VITALS — WEIGHT: 115.2 LBS | SYSTOLIC BLOOD PRESSURE: 108 MMHG | DIASTOLIC BLOOD PRESSURE: 62 MMHG | BODY MASS INDEX: 21.07 KG/M2

## 2020-10-19 DIAGNOSIS — O34.41 HISTORY OF LOOP ELECTROSURGICAL EXCISION PROCEDURE (LEEP) OF CERVIX AFFECTING PREGNANCY IN FIRST TRIMESTER: ICD-10-CM

## 2020-10-19 DIAGNOSIS — Z3A.14 14 WEEKS GESTATION OF PREGNANCY: ICD-10-CM

## 2020-10-19 DIAGNOSIS — O99.331 HIGH RISK PREGNANCY DUE TO SMOKING IN FIRST TRIMESTER: ICD-10-CM

## 2020-10-19 DIAGNOSIS — M54.2 NECK PAIN: ICD-10-CM

## 2020-10-19 DIAGNOSIS — Z87.42 HISTORY OF ABNORMAL CERVICAL PAP SMEAR: ICD-10-CM

## 2020-10-19 DIAGNOSIS — Z98.890 HISTORY OF LOOP ELECTROSURGICAL EXCISION PROCEDURE (LEEP) OF CERVIX AFFECTING PREGNANCY IN FIRST TRIMESTER: ICD-10-CM

## 2020-10-19 DIAGNOSIS — Z87.51 HISTORY OF PRETERM DELIVERY: Primary | ICD-10-CM

## 2020-10-19 PROBLEM — J98.8 VIRAL RESPIRATORY ILLNESS: Status: RESOLVED | Noted: 2019-12-12 | Resolved: 2020-10-19

## 2020-10-19 PROBLEM — B97.89 VIRAL RESPIRATORY ILLNESS: Status: RESOLVED | Noted: 2019-12-12 | Resolved: 2020-10-19

## 2020-10-19 PROCEDURE — 99213 OFFICE O/P EST LOW 20 MIN: CPT | Performed by: OBSTETRICS & GYNECOLOGY

## 2020-10-19 NOTE — PROGRESS NOTES
CC: Prenatal visit    Sebastian Alas is a 29 y.o.  at 14w4d.  Doing well.  Denies contractions, LOF, or VB.  Reports good FM.    /62   Wt 52.3 kg (115 lb 3.2 oz)   LMP 2020 (Exact Date)   BMI 21.07 kg/m²   SVE: Not done today     Fetal Heart Rate: 150     Problems (from 20 to present)     Problem Noted Resolved    History of abnormal cervical Pap smear 2020 by Harsih Johnson MD No    Priority:  High      High risk pregnancy due to smoking in first trimester 2020 by Harish Johnson MD No    Priority:  High      History of LEEP (loop electrosurgical excision procedure) of cervix complicating pregnancy 2020 by Harish Johnson MD No    Priority:  High      History of  delivery 2018 by Harish Johnson MD No    Priority:  High            A/P: Sebastian Alas is a 29 y.o.  at 14w4d.  - RTC in 2 weeks  - Reviewed COVID-19 visitation policy  - Reviewed COVID-19 precautions     Diagnosis Plan   1. History of  delivery   in the process of getting [place] approved to get cervical length on return   2. History of abnormal cervical Pap smear     3. High risk pregnancy due to smoking in first trimester     4. History of loop electrosurgical excision procedure (LEEP) of cervix affecting pregnancy in first trimester     5. 14 weeks gestation of pregnancy     6. Neck pain   had acute neck pain without neurologic deficit today does not want to go to emergency room encouraged to if does not resolve or any acute changes     Harish Johnson MD  10/19/2020  13:24 CDT

## 2020-10-28 ENCOUNTER — HOSPITAL ENCOUNTER (EMERGENCY)
Facility: HOSPITAL | Age: 30
Discharge: HOME | End: 2020-10-28
Attending: EMERGENCY MEDICINE | Admitting: EMERGENCY MEDICINE

## 2020-10-28 VITALS
RESPIRATION RATE: 18 BRPM | HEART RATE: 96 BPM | DIASTOLIC BLOOD PRESSURE: 73 MMHG | WEIGHT: 115 LBS | TEMPERATURE: 99.3 F | HEIGHT: 62 IN | OXYGEN SATURATION: 99 % | BODY MASS INDEX: 21.16 KG/M2 | SYSTOLIC BLOOD PRESSURE: 112 MMHG

## 2020-10-28 DIAGNOSIS — N30.00 ACUTE CYSTITIS WITHOUT HEMATURIA: Primary | ICD-10-CM

## 2020-10-28 LAB
ALBUMIN SERPL-MCNC: 4.1 G/DL (ref 3.5–5.2)
ALBUMIN/GLOB SERPL: 1.6 G/DL
ALP SERPL-CCNC: 77 U/L (ref 39–117)
ALT SERPL W P-5'-P-CCNC: 15 U/L (ref 1–33)
ANION GAP SERPL CALCULATED.3IONS-SCNC: 11 MMOL/L (ref 5–15)
AST SERPL-CCNC: 12 U/L (ref 1–32)
BACTERIA UR QL AUTO: ABNORMAL /HPF
BASOPHILS # BLD AUTO: 0.06 10*3/MM3 (ref 0–0.2)
BASOPHILS NFR BLD AUTO: 0.4 % (ref 0–1.5)
BILIRUB SERPL-MCNC: 0.2 MG/DL (ref 0–1.2)
BILIRUB UR QL STRIP: NEGATIVE
BUN SERPL-MCNC: 6 MG/DL (ref 6–20)
BUN/CREAT SERPL: 9.7 (ref 7–25)
CALCIUM SPEC-SCNC: 9.8 MG/DL (ref 8.6–10.5)
CHLORIDE SERPL-SCNC: 103 MMOL/L (ref 98–107)
CLARITY UR: ABNORMAL
CO2 SERPL-SCNC: 26 MMOL/L (ref 22–29)
COLOR UR: YELLOW
CREAT SERPL-MCNC: 0.62 MG/DL (ref 0.57–1)
DEPRECATED RDW RBC AUTO: 45.1 FL (ref 37–54)
EOSINOPHIL # BLD AUTO: 0.27 10*3/MM3 (ref 0–0.4)
EOSINOPHIL NFR BLD AUTO: 1.7 % (ref 0.3–6.2)
ERYTHROCYTE [DISTWIDTH] IN BLOOD BY AUTOMATED COUNT: 13.3 % (ref 12.3–15.4)
GFR SERPL CREATININE-BSD FRML MDRD: 115 ML/MIN/1.73
GLOBULIN UR ELPH-MCNC: 2.6 GM/DL
GLUCOSE SERPL-MCNC: 89 MG/DL (ref 65–99)
GLUCOSE UR STRIP-MCNC: NEGATIVE MG/DL
HCT VFR BLD AUTO: 39.5 % (ref 34–46.6)
HGB BLD-MCNC: 13 G/DL (ref 12–15.9)
HGB UR QL STRIP.AUTO: NEGATIVE
HOLD SPECIMEN: NORMAL
HYALINE CASTS UR QL AUTO: ABNORMAL /LPF
IMM GRANULOCYTES # BLD AUTO: 0.16 10*3/MM3 (ref 0–0.05)
IMM GRANULOCYTES NFR BLD AUTO: 1 % (ref 0–0.5)
KETONES UR QL STRIP: NEGATIVE
LEUKOCYTE ESTERASE UR QL STRIP.AUTO: ABNORMAL
LYMPHOCYTES # BLD AUTO: 2.97 10*3/MM3 (ref 0.7–3.1)
LYMPHOCYTES NFR BLD AUTO: 18.9 % (ref 19.6–45.3)
MAGNESIUM SERPL-MCNC: 2 MG/DL (ref 1.6–2.6)
MCH RBC QN AUTO: 30 PG (ref 26.6–33)
MCHC RBC AUTO-ENTMCNC: 32.9 G/DL (ref 31.5–35.7)
MCV RBC AUTO: 91.2 FL (ref 79–97)
MONOCYTES # BLD AUTO: 0.93 10*3/MM3 (ref 0.1–0.9)
MONOCYTES NFR BLD AUTO: 5.9 % (ref 5–12)
NEUTROPHILS NFR BLD AUTO: 11.29 10*3/MM3 (ref 1.7–7)
NEUTROPHILS NFR BLD AUTO: 72.1 % (ref 42.7–76)
NITRITE UR QL STRIP: NEGATIVE
NRBC BLD AUTO-RTO: 0 /100 WBC (ref 0–0.2)
PH UR STRIP.AUTO: 7 [PH] (ref 5–9)
PLATELET # BLD AUTO: 311 10*3/MM3 (ref 140–450)
PMV BLD AUTO: 10 FL (ref 6–12)
POTASSIUM SERPL-SCNC: 3.4 MMOL/L (ref 3.5–5.2)
PROT SERPL-MCNC: 6.7 G/DL (ref 6–8.5)
PROT UR QL STRIP: NEGATIVE
RBC # BLD AUTO: 4.33 10*6/MM3 (ref 3.77–5.28)
RBC # UR: ABNORMAL /HPF
REF LAB TEST METHOD: ABNORMAL
SODIUM SERPL-SCNC: 140 MMOL/L (ref 136–145)
SP GR UR STRIP: 1.01 (ref 1–1.03)
SQUAMOUS #/AREA URNS HPF: ABNORMAL /HPF
UROBILINOGEN UR QL STRIP: ABNORMAL
WBC # BLD AUTO: 15.68 10*3/MM3 (ref 3.4–10.8)
WBC UR QL AUTO: ABNORMAL /HPF
WHOLE BLOOD HOLD SPECIMEN: NORMAL

## 2020-10-28 PROCEDURE — 87591 N.GONORRHOEAE DNA AMP PROB: CPT | Performed by: EMERGENCY MEDICINE

## 2020-10-28 PROCEDURE — 81001 URINALYSIS AUTO W/SCOPE: CPT | Performed by: EMERGENCY MEDICINE

## 2020-10-28 PROCEDURE — 80053 COMPREHEN METABOLIC PANEL: CPT | Performed by: EMERGENCY MEDICINE

## 2020-10-28 PROCEDURE — 85025 COMPLETE CBC W/AUTO DIFF WBC: CPT | Performed by: EMERGENCY MEDICINE

## 2020-10-28 PROCEDURE — 99283 EMERGENCY DEPT VISIT LOW MDM: CPT

## 2020-10-28 PROCEDURE — 87661 TRICHOMONAS VAGINALIS AMPLIF: CPT | Performed by: EMERGENCY MEDICINE

## 2020-10-28 PROCEDURE — 87491 CHLMYD TRACH DNA AMP PROBE: CPT | Performed by: EMERGENCY MEDICINE

## 2020-10-28 PROCEDURE — 83735 ASSAY OF MAGNESIUM: CPT | Performed by: EMERGENCY MEDICINE

## 2020-10-28 RX ORDER — CEPHALEXIN 500 MG/1
500 CAPSULE ORAL ONCE
Status: COMPLETED | OUTPATIENT
Start: 2020-10-28 | End: 2020-10-28

## 2020-10-28 RX ORDER — CEPHALEXIN 500 MG/1
500 CAPSULE ORAL 2 TIMES DAILY
Qty: 10 CAPSULE | Refills: 0 | Status: SHIPPED | OUTPATIENT
Start: 2020-10-28 | End: 2020-11-02

## 2020-10-28 RX ORDER — SODIUM CHLORIDE 0.9 % (FLUSH) 0.9 %
10 SYRINGE (ML) INJECTION AS NEEDED
Status: DISCONTINUED | OUTPATIENT
Start: 2020-10-28 | End: 2020-10-29 | Stop reason: HOSPADM

## 2020-10-28 RX ADMIN — CEPHALEXIN 500 MG: 500 CAPSULE ORAL at 23:44

## 2020-10-29 DIAGNOSIS — Z87.51 HISTORY OF PRETERM DELIVERY: Primary | ICD-10-CM

## 2020-10-30 ENCOUNTER — HOSPITAL ENCOUNTER (EMERGENCY)
Facility: HOSPITAL | Age: 30
Discharge: HOME | End: 2020-10-30
Attending: FAMILY MEDICINE | Admitting: STUDENT IN AN ORGANIZED HEALTH CARE EDUCATION/TRAINING PROGRAM

## 2020-10-30 ENCOUNTER — TELEPHONE (OUTPATIENT)
Dept: OBSTETRICS AND GYNECOLOGY | Facility: CLINIC | Age: 30
End: 2020-10-30

## 2020-10-30 ENCOUNTER — APPOINTMENT (OUTPATIENT)
Dept: ULTRASOUND IMAGING | Facility: HOSPITAL | Age: 30
End: 2020-10-30

## 2020-10-30 VITALS
RESPIRATION RATE: 20 BRPM | DIASTOLIC BLOOD PRESSURE: 74 MMHG | OXYGEN SATURATION: 100 % | HEIGHT: 62 IN | TEMPERATURE: 97.6 F | SYSTOLIC BLOOD PRESSURE: 111 MMHG | HEART RATE: 100 BPM | BODY MASS INDEX: 22.26 KG/M2 | WEIGHT: 121 LBS

## 2020-10-30 DIAGNOSIS — O26.899 PELVIC PAIN IN PREGNANCY: Primary | ICD-10-CM

## 2020-10-30 DIAGNOSIS — Z3A.16 16 WEEKS GESTATION OF PREGNANCY: ICD-10-CM

## 2020-10-30 DIAGNOSIS — R10.2 PELVIC PAIN IN PREGNANCY: Primary | ICD-10-CM

## 2020-10-30 LAB
ALBUMIN SERPL-MCNC: 3.9 G/DL (ref 3.5–5.2)
ALBUMIN/GLOB SERPL: 1.7 G/DL
ALP SERPL-CCNC: 73 U/L (ref 39–117)
ALT SERPL W P-5'-P-CCNC: 13 U/L (ref 1–33)
ANION GAP SERPL CALCULATED.3IONS-SCNC: 9 MMOL/L (ref 5–15)
AST SERPL-CCNC: 11 U/L (ref 1–32)
BACTERIA UR QL AUTO: ABNORMAL /HPF
BASOPHILS # BLD AUTO: 0.05 10*3/MM3 (ref 0–0.2)
BASOPHILS NFR BLD AUTO: 0.3 % (ref 0–1.5)
BILIRUB SERPL-MCNC: <0.2 MG/DL (ref 0–1.2)
BILIRUB UR QL STRIP: NEGATIVE
BUN SERPL-MCNC: 7 MG/DL (ref 6–20)
BUN/CREAT SERPL: 13.2 (ref 7–25)
CALCIUM SPEC-SCNC: 9 MG/DL (ref 8.6–10.5)
CHLORIDE SERPL-SCNC: 102 MMOL/L (ref 98–107)
CLARITY UR: ABNORMAL
CO2 SERPL-SCNC: 24 MMOL/L (ref 22–29)
COLOR UR: YELLOW
CREAT SERPL-MCNC: 0.53 MG/DL (ref 0.57–1)
DEPRECATED RDW RBC AUTO: 44.6 FL (ref 37–54)
EOSINOPHIL # BLD AUTO: 0.32 10*3/MM3 (ref 0–0.4)
EOSINOPHIL NFR BLD AUTO: 2.1 % (ref 0.3–6.2)
ERYTHROCYTE [DISTWIDTH] IN BLOOD BY AUTOMATED COUNT: 13.6 % (ref 12.3–15.4)
GFR SERPL CREATININE-BSD FRML MDRD: 138 ML/MIN/1.73
GLOBULIN UR ELPH-MCNC: 2.3 GM/DL
GLUCOSE SERPL-MCNC: 83 MG/DL (ref 65–99)
GLUCOSE UR STRIP-MCNC: NEGATIVE MG/DL
HCT VFR BLD AUTO: 38 % (ref 34–46.6)
HGB BLD-MCNC: 13 G/DL (ref 12–15.9)
HGB UR QL STRIP.AUTO: NEGATIVE
HOLD SPECIMEN: NORMAL
HOLD SPECIMEN: NORMAL
HYALINE CASTS UR QL AUTO: ABNORMAL /LPF
IMM GRANULOCYTES # BLD AUTO: 0.14 10*3/MM3 (ref 0–0.05)
IMM GRANULOCYTES NFR BLD AUTO: 0.9 % (ref 0–0.5)
KETONES UR QL STRIP: NEGATIVE
LEUKOCYTE ESTERASE UR QL STRIP.AUTO: ABNORMAL
LIPASE SERPL-CCNC: 22 U/L (ref 13–60)
LYMPHOCYTES # BLD AUTO: 2.76 10*3/MM3 (ref 0.7–3.1)
LYMPHOCYTES NFR BLD AUTO: 18.2 % (ref 19.6–45.3)
MCH RBC QN AUTO: 30.7 PG (ref 26.6–33)
MCHC RBC AUTO-ENTMCNC: 34.2 G/DL (ref 31.5–35.7)
MCV RBC AUTO: 89.6 FL (ref 79–97)
MONOCYTES # BLD AUTO: 0.98 10*3/MM3 (ref 0.1–0.9)
MONOCYTES NFR BLD AUTO: 6.5 % (ref 5–12)
NEUTROPHILS NFR BLD AUTO: 10.93 10*3/MM3 (ref 1.7–7)
NEUTROPHILS NFR BLD AUTO: 72 % (ref 42.7–76)
NITRITE UR QL STRIP: NEGATIVE
NRBC BLD AUTO-RTO: 0 /100 WBC (ref 0–0.2)
PH UR STRIP.AUTO: 6 [PH] (ref 5–9)
PLATELET # BLD AUTO: 315 10*3/MM3 (ref 140–450)
PMV BLD AUTO: 10 FL (ref 6–12)
POTASSIUM SERPL-SCNC: 3.5 MMOL/L (ref 3.5–5.2)
PROT SERPL-MCNC: 6.2 G/DL (ref 6–8.5)
PROT UR QL STRIP: NEGATIVE
RBC # BLD AUTO: 4.24 10*6/MM3 (ref 3.77–5.28)
RBC # UR: ABNORMAL /HPF
REF LAB TEST METHOD: ABNORMAL
SODIUM SERPL-SCNC: 135 MMOL/L (ref 136–145)
SP GR UR STRIP: 1.01 (ref 1–1.03)
SQUAMOUS #/AREA URNS HPF: ABNORMAL /HPF
UROBILINOGEN UR QL STRIP: ABNORMAL
WBC # BLD AUTO: 15.18 10*3/MM3 (ref 3.4–10.8)
WBC UR QL AUTO: ABNORMAL /HPF
WHOLE BLOOD HOLD SPECIMEN: NORMAL
WHOLE BLOOD HOLD SPECIMEN: NORMAL

## 2020-10-30 PROCEDURE — 81001 URINALYSIS AUTO W/SCOPE: CPT | Performed by: PHYSICIAN ASSISTANT

## 2020-10-30 PROCEDURE — 25010000002 CEFTRIAXONE PER 250 MG: Performed by: PHYSICIAN ASSISTANT

## 2020-10-30 PROCEDURE — 80053 COMPREHEN METABOLIC PANEL: CPT | Performed by: PHYSICIAN ASSISTANT

## 2020-10-30 PROCEDURE — 87086 URINE CULTURE/COLONY COUNT: CPT | Performed by: PHYSICIAN ASSISTANT

## 2020-10-30 PROCEDURE — 99284 EMERGENCY DEPT VISIT MOD MDM: CPT

## 2020-10-30 PROCEDURE — 96372 THER/PROPH/DIAG INJ SC/IM: CPT

## 2020-10-30 PROCEDURE — 76815 OB US LIMITED FETUS(S): CPT

## 2020-10-30 PROCEDURE — 83690 ASSAY OF LIPASE: CPT | Performed by: PHYSICIAN ASSISTANT

## 2020-10-30 PROCEDURE — 85025 COMPLETE CBC W/AUTO DIFF WBC: CPT | Performed by: PHYSICIAN ASSISTANT

## 2020-10-30 RX ORDER — AZITHROMYCIN 250 MG/1
1000 TABLET, FILM COATED ORAL ONCE
Status: COMPLETED | OUTPATIENT
Start: 2020-10-30 | End: 2020-10-30

## 2020-10-30 RX ORDER — SODIUM CHLORIDE 0.9 % (FLUSH) 0.9 %
10 SYRINGE (ML) INJECTION AS NEEDED
Status: DISCONTINUED | OUTPATIENT
Start: 2020-10-30 | End: 2020-10-30 | Stop reason: HOSPADM

## 2020-10-30 RX ADMIN — AZITHROMYCIN 1000 MG: 250 TABLET, FILM COATED ORAL at 18:30

## 2020-10-30 RX ADMIN — LIDOCAINE HYDROCHLORIDE 250 MG: 10 INJECTION, SOLUTION EPIDURAL; INFILTRATION; INTRACAUDAL; PERINEURAL at 18:36

## 2020-10-30 NOTE — TELEPHONE ENCOUNTER
Patient has been to ER. Is having pain and pressure, the antibiotics is not helping. Would like a call back.

## 2020-10-31 LAB
BACTERIA SPEC AEROBE CULT: NO GROWTH
C TRACH RRNA SPEC QL NAA+PROBE: POSITIVE
N GONORRHOEA RRNA SPEC QL NAA+PROBE: NEGATIVE
T VAGINALIS DNA SPEC QL NAA+PROBE: NEGATIVE

## 2020-11-02 ENCOUNTER — ROUTINE PRENATAL (OUTPATIENT)
Dept: OBSTETRICS AND GYNECOLOGY | Facility: CLINIC | Age: 30
End: 2020-11-02

## 2020-11-02 ENCOUNTER — TELEPHONE (OUTPATIENT)
Dept: EMERGENCY DEPT | Facility: HOSPITAL | Age: 30
End: 2020-11-02

## 2020-11-02 ENCOUNTER — TELEPHONE (OUTPATIENT)
Dept: OBSTETRICS AND GYNECOLOGY | Facility: CLINIC | Age: 30
End: 2020-11-02

## 2020-11-02 VITALS — DIASTOLIC BLOOD PRESSURE: 64 MMHG | BODY MASS INDEX: 21.77 KG/M2 | SYSTOLIC BLOOD PRESSURE: 98 MMHG | WEIGHT: 119 LBS

## 2020-11-02 DIAGNOSIS — O99.331 HIGH RISK PREGNANCY DUE TO SMOKING IN FIRST TRIMESTER: ICD-10-CM

## 2020-11-02 DIAGNOSIS — Z87.51 HISTORY OF PRETERM DELIVERY: ICD-10-CM

## 2020-11-02 DIAGNOSIS — Z3A.16 16 WEEKS GESTATION OF PREGNANCY: ICD-10-CM

## 2020-11-02 DIAGNOSIS — Z36.3 ENCOUNTER FOR ANTENATAL SCREENING FOR MALFORMATIONS: Primary | ICD-10-CM

## 2020-11-02 DIAGNOSIS — Z98.890 HISTORY OF LOOP ELECTROSURGICAL EXCISION PROCEDURE (LEEP) OF CERVIX AFFECTING PREGNANCY IN FIRST TRIMESTER: ICD-10-CM

## 2020-11-02 DIAGNOSIS — O34.41 HISTORY OF LOOP ELECTROSURGICAL EXCISION PROCEDURE (LEEP) OF CERVIX AFFECTING PREGNANCY IN FIRST TRIMESTER: ICD-10-CM

## 2020-11-02 PROCEDURE — 99213 OFFICE O/P EST LOW 20 MIN: CPT | Performed by: OBSTETRICS & GYNECOLOGY

## 2020-11-03 ENCOUNTER — TELEPHONE (OUTPATIENT)
Dept: OBSTETRICS AND GYNECOLOGY | Facility: CLINIC | Age: 30
End: 2020-11-03

## 2020-11-03 DIAGNOSIS — O26.872 SHORT CERVICAL LENGTH DURING PREGNANCY IN SECOND TRIMESTER: Primary | ICD-10-CM

## 2020-11-03 NOTE — TELEPHONE ENCOUNTER
I called the patient and left her a message to call back and get scheduled for a cervical length/anatomy ultrasound for 2 weeks.

## 2020-11-04 ENCOUNTER — TELEPHONE (OUTPATIENT)
Dept: OBSTETRICS AND GYNECOLOGY | Facility: CLINIC | Age: 30
End: 2020-11-04

## 2020-11-04 RX ORDER — AZITHROMYCIN 250 MG/1
TABLET, FILM COATED ORAL
Qty: 4 TABLET | Refills: 1 | Status: SHIPPED | OUTPATIENT
Start: 2020-11-04 | End: 2020-11-09

## 2020-11-04 NOTE — PROGRESS NOTES
CC: Prenatal visit    Sebastian Alas is a 29 y.o.  at 16w5d.  Doing well.  Denies contractions, LOF, or VB.  Reports good FM.    BP 98/64   Wt 54 kg (119 lb)   LMP 2020 (Exact Date)   BMI 21.77 kg/m²         Fetal Heart Rate: 158     Problems (from 20 to present)     Problem Noted Resolved    History of abnormal cervical Pap smear 2020 by Harish Johnson MD No    Priority:  High      Positive urine drug screen 2020 by Harish Johnson MD No    Priority:  High      High risk pregnancy due to smoking in first trimester 2020 by Harish Johnson MD No    Priority:  High      History of LEEP (loop electrosurgical excision procedure) of cervix complicating pregnancy 2020 by Harish Johnson MD No cervical length is reassuring today 3.45 cm    Priority:  High      History of  delivery 2018 by Harish Johnson MD No    Priority:  High            A/P: Sebastian Alas is a 29 y.o.  at 16w5d.  - RTC in 2 weeks  - Reviewed COVID-19 visitation policy  - Reviewed COVID-19 precautions      Harish Johnson MD  11/3/2020  18:44 CST

## 2020-11-04 NOTE — TELEPHONE ENCOUNTER
Reviewed results of chlamydia positive from ER will send in prescription to use condoms until she and partner treated.  Importance of this for her and baby reviewed

## 2020-11-11 ENCOUNTER — TELEPHONE (OUTPATIENT)
Dept: OBSTETRICS AND GYNECOLOGY | Facility: CLINIC | Age: 30
End: 2020-11-11

## 2020-11-11 NOTE — TELEPHONE ENCOUNTER
I called the patient to schedule her an appointment to come in and get her [place] injection but didn't get an answer.

## 2020-11-23 ENCOUNTER — ROUTINE PRENATAL (OUTPATIENT)
Dept: OBSTETRICS AND GYNECOLOGY | Facility: CLINIC | Age: 30
End: 2020-11-23

## 2020-11-23 DIAGNOSIS — Z3A.19 19 WEEKS GESTATION OF PREGNANCY: ICD-10-CM

## 2020-11-23 DIAGNOSIS — R82.5 POSITIVE URINE DRUG SCREEN: ICD-10-CM

## 2020-11-23 DIAGNOSIS — O26.872 SHORT CERVIX DURING PREGNANCY IN SECOND TRIMESTER: ICD-10-CM

## 2020-11-23 DIAGNOSIS — O99.331 HIGH RISK PREGNANCY DUE TO SMOKING IN FIRST TRIMESTER: ICD-10-CM

## 2020-11-23 DIAGNOSIS — Z87.42 HISTORY OF ABNORMAL CERVICAL PAP SMEAR: ICD-10-CM

## 2020-11-23 DIAGNOSIS — Z87.51 HISTORY OF PRETERM DELIVERY: Primary | ICD-10-CM

## 2020-11-23 DIAGNOSIS — Z98.890 HISTORY OF LOOP ELECTROSURGICAL EXCISION PROCEDURE (LEEP) OF CERVIX AFFECTING PREGNANCY IN FIRST TRIMESTER: ICD-10-CM

## 2020-11-23 DIAGNOSIS — O34.41 HISTORY OF LOOP ELECTROSURGICAL EXCISION PROCEDURE (LEEP) OF CERVIX AFFECTING PREGNANCY IN FIRST TRIMESTER: ICD-10-CM

## 2020-11-23 PROCEDURE — 99213 OFFICE O/P EST LOW 20 MIN: CPT | Performed by: OBSTETRICS & GYNECOLOGY

## 2020-11-23 RX ORDER — HYDROXYPROGESTERONE CAPROATE 250 MG/ML
250 INJECTION INTRAMUSCULAR
Status: DISCONTINUED | OUTPATIENT
Start: 2020-11-23 | End: 2021-03-25

## 2020-11-24 VITALS — SYSTOLIC BLOOD PRESSURE: 98 MMHG | DIASTOLIC BLOOD PRESSURE: 72 MMHG | WEIGHT: 123.4 LBS | BODY MASS INDEX: 22.57 KG/M2

## 2020-11-24 PROBLEM — O26.872 SHORT CERVIX DURING PREGNANCY IN SECOND TRIMESTER: Status: ACTIVE | Noted: 2020-11-24

## 2020-11-24 NOTE — PROGRESS NOTES
CC: Prenatal visit    Sebastian Alas is a 29 y.o.  at 19w5d.  Doing well.  Denies contractions, LOF, or VB.  Reports good FM.    BP 98/72   Wt 56 kg (123 lb 6.4 oz)   LMP 2020 (Exact Date)   BMI 22.57 kg/m²        Fetal Heart Rate: 160     Problems (from 20 to present)     Problem Noted Resolved    History of abnormal cervical Pap smear 2020 by Harish Johnson MD No    Priority:  High      Positive urine drug screen 2020 by Harish Johnson MD No    Priority:  High      High risk pregnancy due to smoking in first trimester 2020 by Harish Johnson MD No    Priority:  High      History of LEEP (loop electrosurgical excision procedure) of cervix complicating pregnancy 2020 by Harish Johnson MD No    Priority:  High      History of  delivery 2018 by Harish Johnson MD No    Priority:  High            A/P: Sebastian Alas is a 29 y.o.  at 19w5d.  - RTC in 1 weeks  - Reviewed COVID-19 visitation policy  - Reviewed COVID-19 precautions     Diagnosis Plan   1. History of  delivery  HYDROXYprogesterone caproate (DANIEL) IM injection 250 mg will give weekly   2. History of abnormal cervical Pap smear     3. High risk pregnancy due to smoking in first trimester     4. History of loop electrosurgical excision procedure (LEEP) of cervix affecting pregnancy in first trimester     5. Positive urine drug screen     6. Short cervix during pregnancy in second trimester   cervix is just under 2.5 will reevaluate in 1 week per MFM I discussed cerclage with patient wants to hold off for now   7. 19 weeks gestation of pregnancy       Harish Johnson MD  2020  14:23 CST

## 2020-12-02 ENCOUNTER — TELEPHONE (OUTPATIENT)
Dept: OBSTETRICS AND GYNECOLOGY | Facility: CLINIC | Age: 30
End: 2020-12-02

## 2020-12-02 NOTE — TELEPHONE ENCOUNTER
I called the patient to see if she could come in tomorrow at 11:00 a.m. for an ultrasound. Patient said she could so she is on the schedule.

## 2020-12-03 ENCOUNTER — ROUTINE PRENATAL (OUTPATIENT)
Dept: OBSTETRICS AND GYNECOLOGY | Facility: CLINIC | Age: 30
End: 2020-12-03

## 2020-12-03 VITALS — SYSTOLIC BLOOD PRESSURE: 98 MMHG | WEIGHT: 125 LBS | BODY MASS INDEX: 22.86 KG/M2 | DIASTOLIC BLOOD PRESSURE: 64 MMHG

## 2020-12-03 DIAGNOSIS — Z98.890 HISTORY OF LOOP ELECTROSURGICAL EXCISION PROCEDURE (LEEP) OF CERVIX AFFECTING PREGNANCY IN FIRST TRIMESTER: ICD-10-CM

## 2020-12-03 DIAGNOSIS — O34.41 HISTORY OF LOOP ELECTROSURGICAL EXCISION PROCEDURE (LEEP) OF CERVIX AFFECTING PREGNANCY IN FIRST TRIMESTER: ICD-10-CM

## 2020-12-03 DIAGNOSIS — O99.331 HIGH RISK PREGNANCY DUE TO SMOKING IN FIRST TRIMESTER: ICD-10-CM

## 2020-12-03 DIAGNOSIS — Z87.51 HISTORY OF PRETERM DELIVERY: ICD-10-CM

## 2020-12-03 DIAGNOSIS — R82.5 POSITIVE URINE DRUG SCREEN: ICD-10-CM

## 2020-12-03 DIAGNOSIS — Z3A.21 21 WEEKS GESTATION OF PREGNANCY: Primary | ICD-10-CM

## 2020-12-03 DIAGNOSIS — Z87.42 HISTORY OF ABNORMAL CERVICAL PAP SMEAR: ICD-10-CM

## 2020-12-03 PROCEDURE — 99213 OFFICE O/P EST LOW 20 MIN: CPT | Performed by: OBSTETRICS & GYNECOLOGY

## 2020-12-03 PROCEDURE — 96372 THER/PROPH/DIAG INJ SC/IM: CPT | Performed by: OBSTETRICS & GYNECOLOGY

## 2020-12-03 RX ADMIN — HYDROXYPROGESTERONE CAPROATE 250 MG: 250 INJECTION INTRAMUSCULAR at 13:19

## 2020-12-04 PROBLEM — Z3A.21 21 WEEKS GESTATION OF PREGNANCY: Status: ACTIVE | Noted: 2020-12-04

## 2020-12-05 NOTE — PROGRESS NOTES
CC: Prenatal visit    Sebastian Alas is a 29 y.o.  at 21w1d.  Doing well.  Denies contractions, LOF, or VB.  Reports good FM.    BP 98/64   Wt 56.7 kg (125 lb)   LMP 2020 (Exact Date)   BMI 22.86 kg/m²   SVE: Not done           Problems (from 20 to present)     Problem Noted Resolved    History of abnormal cervical Pap smear 2020 by Harish Johnson MD No    Priority:  High      Positive urine drug screen 2020 by Harish Johnson MD No    Priority:  High      High risk pregnancy due to smoking in first trimester 2020 by Harish Johnson MD No    Priority:  High      History of LEEP (loop electrosurgical excision procedure) of cervix complicating pregnancy 2020 by Harish Johnson MD No    Priority:  High      History of  delivery 2018 by Harish Johnson MD No    Priority:  High            A/P: Sebastian Alas is a 29 y.o.  at 21w1d.  - RTC in 1 weeks  - Reviewed COVID-19 visitation policy  - Reviewed COVID-19 precautions     Diagnosis Plan   1. 21 weeks gestation of pregnancy     2. History of abnormal cervical Pap smear     3. High risk pregnancy due to smoking in first trimester     4. History of loop electrosurgical excision procedure (LEEP) of cervix affecting pregnancy in first trimester     5. Positive urine drug screen     6. History of  delivery   cervical length is actually over 3 today.  Will await MFM reading.  Given 17 hydroxyprogesterone patient understands recent controversy about its effectiveness     Harish Johnson MD  2020  21:45 CST

## 2020-12-07 DIAGNOSIS — O26.872 SHORT CERVICAL LENGTH DURING PREGNANCY IN SECOND TRIMESTER: Primary | ICD-10-CM

## 2020-12-07 DIAGNOSIS — Z87.51 HISTORY OF PRETERM DELIVERY: ICD-10-CM

## 2020-12-17 ENCOUNTER — ROUTINE PRENATAL (OUTPATIENT)
Dept: OBSTETRICS AND GYNECOLOGY | Facility: CLINIC | Age: 30
End: 2020-12-17

## 2020-12-17 VITALS — SYSTOLIC BLOOD PRESSURE: 100 MMHG | DIASTOLIC BLOOD PRESSURE: 62 MMHG | BODY MASS INDEX: 22.72 KG/M2 | WEIGHT: 124.2 LBS

## 2020-12-17 DIAGNOSIS — Z87.51 HISTORY OF PRETERM DELIVERY: ICD-10-CM

## 2020-12-17 DIAGNOSIS — Z87.42 HISTORY OF ABNORMAL CERVICAL PAP SMEAR: ICD-10-CM

## 2020-12-17 DIAGNOSIS — Z3A.23 23 WEEKS GESTATION OF PREGNANCY: Primary | ICD-10-CM

## 2020-12-17 DIAGNOSIS — R82.5 POSITIVE URINE DRUG SCREEN: ICD-10-CM

## 2020-12-17 DIAGNOSIS — O34.40 HISTORY OF LOOP ELECTROSURGICAL EXCISION PROCEDURE (LEEP) OF CERVIX AFFECTING PREGNANCY, ANTEPARTUM: ICD-10-CM

## 2020-12-17 DIAGNOSIS — Z98.890 HISTORY OF LOOP ELECTROSURGICAL EXCISION PROCEDURE (LEEP) OF CERVIX AFFECTING PREGNANCY, ANTEPARTUM: ICD-10-CM

## 2020-12-17 DIAGNOSIS — O99.331 HIGH RISK PREGNANCY DUE TO SMOKING IN FIRST TRIMESTER: ICD-10-CM

## 2020-12-17 PROCEDURE — 99213 OFFICE O/P EST LOW 20 MIN: CPT | Performed by: OBSTETRICS & GYNECOLOGY

## 2020-12-17 PROCEDURE — 96372 THER/PROPH/DIAG INJ SC/IM: CPT | Performed by: OBSTETRICS & GYNECOLOGY

## 2020-12-17 RX ADMIN — HYDROXYPROGESTERONE CAPROATE 250 MG: 250 INJECTION INTRAMUSCULAR at 15:09

## 2020-12-18 PROBLEM — Z3A.23 23 WEEKS GESTATION OF PREGNANCY: Status: ACTIVE | Noted: 2020-12-18

## 2020-12-18 NOTE — PROGRESS NOTES
CC: Prenatal visit    Sebastian Alas is a 30 y.o.  at 23w1d.  Doing well.  Denies contractions, LOF, or VB.  Reports good FM.    /62   Wt 56.3 kg (124 lb 3.2 oz)   LMP 2020 (Exact Date)   BMI 22.72 kg/m²   SVE: Not done  Fundal Height (cm): 24 cm  Fetal Heart Rate: 154     Problems (from 20 to present)     Problem Noted Resolved    History of abnormal cervical Pap smear 2020 by Harish Johnson MD No    Priority:  High      Positive urine drug screen 2020 by Harish Johnson MD No    Priority:  High      High risk pregnancy due to smoking in first trimester 2020 by Harish Johnson MD No    Priority:  High      History of LEEP (loop electrosurgical excision procedure) of cervix complicating pregnancy 2020 by Harish Johnson MD No    Priority:  High      History of  delivery 2018 by Harish Johnson MD No    Priority:  High            A/P: Sebastian Alas is a 30 y.o.  at 23w1d.  - RTC in 1 weeks  - Reviewed COVID-19 visitation policy  - Reviewed COVID-19 precautions     Diagnosis Plan   1. 23 weeks gestation of pregnancy     2. History of abnormal cervical Pap smear     3. High risk pregnancy due to smoking in first trimester     4. History of loop electrosurgical excision procedure (LEEP) of cervix affecting pregnancy, antepartum   cervical length today is good at 3.3.  She is on weekly 17 progesterone.  Recent conflicting data about this reviewed but she wants to continue   5. Positive urine drug screen     6. History of  delivery       Harish Johnson MD  2020  08:30 CST

## 2021-01-13 ENCOUNTER — ROUTINE PRENATAL (OUTPATIENT)
Dept: OBSTETRICS AND GYNECOLOGY | Facility: CLINIC | Age: 31
End: 2021-01-13

## 2021-01-13 DIAGNOSIS — O47.00 PRETERM CONTRACTIONS: Primary | ICD-10-CM

## 2021-01-13 DIAGNOSIS — Z3A.26 26 WEEKS GESTATION OF PREGNANCY: ICD-10-CM

## 2021-01-13 DIAGNOSIS — Z87.42 HISTORY OF ABNORMAL CERVICAL PAP SMEAR: ICD-10-CM

## 2021-01-13 DIAGNOSIS — Z87.51 HISTORY OF PRETERM DELIVERY: ICD-10-CM

## 2021-01-13 DIAGNOSIS — O34.40 HISTORY OF LOOP ELECTROSURGICAL EXCISION PROCEDURE (LEEP) OF CERVIX AFFECTING PREGNANCY, ANTEPARTUM: ICD-10-CM

## 2021-01-13 DIAGNOSIS — O99.331 HIGH RISK PREGNANCY DUE TO SMOKING IN FIRST TRIMESTER: ICD-10-CM

## 2021-01-13 DIAGNOSIS — R82.5 POSITIVE URINE DRUG SCREEN: ICD-10-CM

## 2021-01-13 DIAGNOSIS — Z98.890 HISTORY OF LOOP ELECTROSURGICAL EXCISION PROCEDURE (LEEP) OF CERVIX AFFECTING PREGNANCY, ANTEPARTUM: ICD-10-CM

## 2021-01-13 PROCEDURE — 96372 THER/PROPH/DIAG INJ SC/IM: CPT | Performed by: OBSTETRICS & GYNECOLOGY

## 2021-01-13 PROCEDURE — 99213 OFFICE O/P EST LOW 20 MIN: CPT | Performed by: OBSTETRICS & GYNECOLOGY

## 2021-01-13 RX ORDER — HYDROXYPROGESTERONE CAPROATE 250 MG/ML
250 INJECTION INTRAMUSCULAR
Status: DISCONTINUED | OUTPATIENT
Start: 2021-01-13 | End: 2021-03-25

## 2021-01-13 RX ADMIN — HYDROXYPROGESTERONE CAPROATE 250 MG: 250 INJECTION INTRAMUSCULAR at 14:04

## 2021-01-14 VITALS — WEIGHT: 130 LBS | DIASTOLIC BLOOD PRESSURE: 64 MMHG | BODY MASS INDEX: 23.78 KG/M2 | SYSTOLIC BLOOD PRESSURE: 102 MMHG

## 2021-01-14 PROBLEM — Z3A.26 26 WEEKS GESTATION OF PREGNANCY: Status: ACTIVE | Noted: 2021-01-14

## 2021-01-14 PROBLEM — O47.00 PRETERM CONTRACTIONS: Status: ACTIVE | Noted: 2021-01-14

## 2021-01-15 NOTE — PROGRESS NOTES
CC: Prenatal visit    Sebastian Alas is a 30 y.o.  at 27w0d.  Doing well.  Denies contractions, LOF, or VB.  Reports good FM.    /64   Wt 59 kg (130 lb)   LMP 2020 (Exact Date)   BMI 23.78 kg/m²   SVE: Not done  Fundal Height (cm): 26 cm  Fetal Heart Rate: 160     Problems (from 20 to present)     Problem Noted Resolved    History of abnormal cervical Pap smear 2020 by Harish Johnson MD No    Priority:  High      Positive urine drug screen 2020 by Harish Johnson MD No    Priority:  High      High risk pregnancy due to smoking in first trimester 2020 by Harish Johnson MD No    Priority:  High      History of LEEP (loop electrosurgical excision procedure) of cervix complicating pregnancy 2020 by Harish Johnson MD No    Priority:  High      History of  delivery 2018 by Harish Johnson MD No    Priority:  High            A/P: Sebastian Alas is a 30 y.o.  at 27w0d.  - RTC in 1 weeks  - Reviewed COVID-19 visitation policy  - Reviewed COVID-19 precautions     Diagnosis Plan   1.  contractions  US Ob Transvaginal cervical length reassuring.  Discussed whether or not she wants to continue on with the 17 hydroxy progesterone again discussed controversy my personal opinion.  She wants to continue advised her that it is very important she be more compliant   2. History of abnormal cervical Pap smear     3. High risk pregnancy due to smoking in first trimester  Glucose, Post 50 Gm Glucola   4. History of loop electrosurgical excision procedure (LEEP) of cervix affecting pregnancy, antepartum     5. Positive urine drug screen     6. History of  delivery  HYDROXYprogesterone caproate (DANIEL) IM injection 250 mg   7. 26 weeks gestation of pregnancy       Harish Johnson MD  2021  21:37 CST

## 2021-02-03 ENCOUNTER — ROUTINE PRENATAL (OUTPATIENT)
Dept: OBSTETRICS AND GYNECOLOGY | Facility: CLINIC | Age: 31
End: 2021-02-03

## 2021-02-03 VITALS — SYSTOLIC BLOOD PRESSURE: 94 MMHG | BODY MASS INDEX: 24.69 KG/M2 | DIASTOLIC BLOOD PRESSURE: 62 MMHG | WEIGHT: 135 LBS

## 2021-02-03 DIAGNOSIS — O09.899 HISTORY OF MATERNAL CHLAMYDIA INFECTION, CURRENTLY PREGNANT: ICD-10-CM

## 2021-02-03 DIAGNOSIS — R82.5 POSITIVE URINE DRUG SCREEN: Primary | ICD-10-CM

## 2021-02-03 DIAGNOSIS — Z3A.29 29 WEEKS GESTATION OF PREGNANCY: ICD-10-CM

## 2021-02-03 DIAGNOSIS — Z91.199 NONCOMPLIANCE: Chronic | ICD-10-CM

## 2021-02-03 DIAGNOSIS — O34.40 HISTORY OF LOOP ELECTROSURGICAL EXCISION PROCEDURE (LEEP) OF CERVIX AFFECTING PREGNANCY, ANTEPARTUM: ICD-10-CM

## 2021-02-03 DIAGNOSIS — Z98.890 HISTORY OF LOOP ELECTROSURGICAL EXCISION PROCEDURE (LEEP) OF CERVIX AFFECTING PREGNANCY, ANTEPARTUM: ICD-10-CM

## 2021-02-03 DIAGNOSIS — Z87.42 HISTORY OF ABNORMAL CERVICAL PAP SMEAR: ICD-10-CM

## 2021-02-03 DIAGNOSIS — Z87.51 HISTORY OF PRETERM DELIVERY: ICD-10-CM

## 2021-02-03 DIAGNOSIS — O99.331 HIGH RISK PREGNANCY DUE TO SMOKING IN FIRST TRIMESTER: ICD-10-CM

## 2021-02-03 LAB
AMPHET+METHAMPHET UR QL: NEGATIVE
AMPHETAMINES UR QL: NEGATIVE
BARBITURATES UR QL SCN: NEGATIVE
BENZODIAZ UR QL SCN: NEGATIVE
BUPRENORPHINE SERPL-MCNC: NEGATIVE NG/ML
CANNABINOIDS SERPL QL: NEGATIVE
COCAINE UR QL: NEGATIVE
METHADONE UR QL SCN: NEGATIVE
OPIATES UR QL: NEGATIVE
OXYCODONE UR QL SCN: NEGATIVE
PCP UR QL SCN: NEGATIVE
PROPOXYPH UR QL: NEGATIVE
TRICYCLICS UR QL SCN: NEGATIVE

## 2021-02-03 PROCEDURE — 80306 DRUG TEST PRSMV INSTRMNT: CPT | Performed by: OBSTETRICS & GYNECOLOGY

## 2021-02-03 PROCEDURE — 87591 N.GONORRHOEAE DNA AMP PROB: CPT | Performed by: OBSTETRICS & GYNECOLOGY

## 2021-02-03 PROCEDURE — 99213 OFFICE O/P EST LOW 20 MIN: CPT | Performed by: OBSTETRICS & GYNECOLOGY

## 2021-02-03 PROCEDURE — 87491 CHLMYD TRACH DNA AMP PROBE: CPT | Performed by: OBSTETRICS & GYNECOLOGY

## 2021-02-03 PROCEDURE — 87661 TRICHOMONAS VAGINALIS AMPLIF: CPT | Performed by: OBSTETRICS & GYNECOLOGY

## 2021-02-03 PROCEDURE — 96372 THER/PROPH/DIAG INJ SC/IM: CPT | Performed by: OBSTETRICS & GYNECOLOGY

## 2021-02-03 RX ADMIN — HYDROXYPROGESTERONE CAPROATE 250 MG: 250 INJECTION INTRAMUSCULAR at 14:22

## 2021-02-03 NOTE — PROGRESS NOTES
CC: Prenatal visit    Sebastian Alas is a 30 y.o.  at 29w6d.  Doing well.  Denies contractions, LOF, or VB.  Reports good FM.    BP 94/62   Wt 61.2 kg (135 lb)   LMP 2020 (Exact Date)   BMI 24.69 kg/m²   SVE: Not done  Fundal Height (cm): 29 cm  Fetal Heart Rate: 150     Problems (from 20 to present)     Problem Noted Resolved    Noncompliance 2/3/2021 by Harish Johnson MD No    Priority:  High      History of abnormal cervical Pap smear 2020 by Harish Johnson MD No    Priority:  High      Positive urine drug screen 2020 by Harish Johnson MD No    Priority:  High      High risk pregnancy due to smoking in first trimester 2020 by Harish Johnson MD No    Priority:  High      History of LEEP (loop electrosurgical excision procedure) of cervix complicating pregnancy 2020 by Harish Johnson MD No    Priority:  High      History of  delivery 2018 by Harish Johnson MD No    Priority:  High            A/P: Sebastian Alas is a 30 y.o.  at 29w6d.  - RTC in 1 weeks  - Reviewed COVID-19 visitation policy  - Reviewed COVID-19 precautions     Diagnosis Plan   1. Positive urine drug screen  Urine Drug Screen - Urine, Clean Catch    Chlamydia trachomatis, Neisseria gonorrhoeae, Trichomonas vaginalis, PCR - Urine, Urine, Clean Catch    Chlamydia trachomatis, Neisseria gonorrhoeae, Trichomonas vaginalis, PCR - Urine, Urine, Clean Catch   2. History of abnormal cervical Pap smear  Chlamydia trachomatis, Neisseria gonorrhoeae, Trichomonas vaginalis, PCR - Urine, Urine, Clean Catch    Chlamydia trachomatis, Neisseria gonorrhoeae, Trichomonas vaginalis, PCR - Urine, Urine, Clean Catch   3. High risk pregnancy due to smoking in first trimester  Chlamydia trachomatis, Neisseria gonorrhoeae, Trichomonas vaginalis, PCR - Urine, Urine, Clean Catch    Chlamydia trachomatis, Neisseria gonorrhoeae, Trichomonas vaginalis, PCR - Urine, Urine, Clean Catch   4.  History of loop electrosurgical excision procedure (LEEP) of cervix affecting pregnancy, antepartum  Chlamydia trachomatis, Neisseria gonorrhoeae, Trichomonas vaginalis, PCR - Urine, Urine, Clean Catch    Chlamydia trachomatis, Neisseria gonorrhoeae, Trichomonas vaginalis, PCR - Urine, Urine, Clean Catch   5. History of  delivery  Chlamydia trachomatis, Neisseria gonorrhoeae, Trichomonas vaginalis, PCR - Urine, Urine, Clean Catch    Chlamydia trachomatis, Neisseria gonorrhoeae, Trichomonas vaginalis, PCR - Urine, Urine, Clean Catch still interested 17 hydroxyprogesterone went over recent controversies.   6. History of maternal chlamydia infection, currently pregnant  CT/NG, TV, PCR - Urine, Urine, Clean Catch    Chlamydia trachomatis, Neisseria gonorrhoeae, Trichomonas vaginalis, PCR - Urine, Urine, Clean Catch    Chlamydia trachomatis, Neisseria gonorrhoeae, Trichomonas vaginalis, PCR - Urine, Urine, Clean Catch   7. Noncompliance   patient has not been seen for some time importance of regular follow-up reviewed   8. 29 weeks gestation of pregnancy     Results console reviewed with patient.  Will get 1 hour.  We will also recheck hemoglobin original in October had been good at 13.  Reviewed care [facility] no relevancy found  Harish Johnson MD  2/3/2021  14:52 CST

## 2021-02-25 ENCOUNTER — ROUTINE PRENATAL (OUTPATIENT)
Dept: OBSTETRICS AND GYNECOLOGY | Facility: CLINIC | Age: 31
End: 2021-02-25

## 2021-02-25 VITALS — WEIGHT: 138 LBS | SYSTOLIC BLOOD PRESSURE: 102 MMHG | DIASTOLIC BLOOD PRESSURE: 66 MMHG | BODY MASS INDEX: 25.24 KG/M2

## 2021-02-25 DIAGNOSIS — Z98.890 HISTORY OF LOOP ELECTROSURGICAL EXCISION PROCEDURE (LEEP) OF CERVIX AFFECTING PREGNANCY, ANTEPARTUM: ICD-10-CM

## 2021-02-25 DIAGNOSIS — Z87.42 HISTORY OF ABNORMAL CERVICAL PAP SMEAR: ICD-10-CM

## 2021-02-25 DIAGNOSIS — Z87.51 HISTORY OF PRETERM DELIVERY: ICD-10-CM

## 2021-02-25 DIAGNOSIS — O99.331 HIGH RISK PREGNANCY DUE TO SMOKING IN FIRST TRIMESTER: ICD-10-CM

## 2021-02-25 DIAGNOSIS — R82.5 POSITIVE URINE DRUG SCREEN: ICD-10-CM

## 2021-02-25 DIAGNOSIS — O34.40 HISTORY OF LOOP ELECTROSURGICAL EXCISION PROCEDURE (LEEP) OF CERVIX AFFECTING PREGNANCY, ANTEPARTUM: ICD-10-CM

## 2021-02-25 DIAGNOSIS — Z3A.32 32 WEEKS GESTATION OF PREGNANCY: ICD-10-CM

## 2021-02-25 DIAGNOSIS — Z91.199 NONCOMPLIANCE: ICD-10-CM

## 2021-02-25 DIAGNOSIS — O26.849 FETAL SIZE INCONSISTENT WITH DATES: Primary | ICD-10-CM

## 2021-02-25 PROCEDURE — 99213 OFFICE O/P EST LOW 20 MIN: CPT | Performed by: OBSTETRICS & GYNECOLOGY

## 2021-02-25 PROCEDURE — 96372 THER/PROPH/DIAG INJ SC/IM: CPT | Performed by: OBSTETRICS & GYNECOLOGY

## 2021-02-25 RX ADMIN — HYDROXYPROGESTERONE CAPROATE 250 MG: 250 INJECTION INTRAMUSCULAR at 13:18

## 2021-02-27 PROBLEM — O36.8120 DECREASED FETAL MOVEMENTS IN SECOND TRIMESTER: Status: ACTIVE | Noted: 2021-02-27

## 2021-02-27 PROBLEM — Z3A.32 32 WEEKS GESTATION OF PREGNANCY: Status: ACTIVE | Noted: 2021-02-27

## 2021-02-28 NOTE — PROGRESS NOTES
CC: Prenatal visit    Sebastian Alas is a 30 y.o.  at 33w2d.  Doing well.  Denies contractions, LOF, or VB.  Reports good FM.    /66   Wt 62.6 kg (138 lb)   LMP 2020 (Exact Date)   BMI 25.24 kg/m²   SVE: Declines  Fundal Height (cm): 33 cm  Fetal Heart Rate: 137     Problems (from 20 to present)     Problem Noted Resolved    Noncompliance 2/3/2021 by Harish Johnson MD No    Priority:  High      History of abnormal cervical Pap smear 2020 by Harish Johnson MD No    Priority:  High      Positive urine drug screen 2020 by Harish Johnson MD No    Priority:  High      High risk pregnancy due to smoking in first trimester 2020 by Harish Johnson MD No    Priority:  High      History of LEEP (loop electrosurgical excision procedure) of cervix complicating pregnancy 2020 by Harish Johnson MD No    Priority:  High      History of  delivery 2018 by Harish Johnson MD No    Priority:  High            A/P: Sebastian Alas is a 30 y.o.  at 33w2d.  - RTC in 1 weeks  - Reviewed COVID-19 visitation policy  - Reviewed COVID-19 precautions     Diagnosis Plan   1. Fetal size inconsistent with dates  MFM OB US MAD will get ultrasound for evaluation   2. Noncompliance     3. History of abnormal cervical Pap smear     4. High risk pregnancy due to smoking in first trimester     5. History of loop electrosurgical excision procedure (LEEP) of cervix affecting pregnancy, antepartum     6. Positive urine drug screen     7. History of  delivery     8. Decreased fetal movements in second trimester, single or unspecified fetus     9. 32 weeks gestation of pregnancy     Will order ultrasound for evaluation of fetal size because of discrepancy.  Really results console reviewed.  Has never gotten Glucola strongly urged to do this  Harish Johnson MD  2021  22:25 CST

## 2021-03-03 ENCOUNTER — ROUTINE PRENATAL (OUTPATIENT)
Dept: OBSTETRICS AND GYNECOLOGY | Facility: CLINIC | Age: 31
End: 2021-03-03

## 2021-03-03 VITALS — DIASTOLIC BLOOD PRESSURE: 68 MMHG | WEIGHT: 141.2 LBS | BODY MASS INDEX: 25.83 KG/M2 | SYSTOLIC BLOOD PRESSURE: 102 MMHG

## 2021-03-03 DIAGNOSIS — O99.331 HIGH RISK PREGNANCY DUE TO SMOKING IN FIRST TRIMESTER: ICD-10-CM

## 2021-03-03 DIAGNOSIS — R82.5 POSITIVE URINE DRUG SCREEN: ICD-10-CM

## 2021-03-03 DIAGNOSIS — O34.40 HISTORY OF LOOP ELECTROSURGICAL EXCISION PROCEDURE (LEEP) OF CERVIX AFFECTING PREGNANCY, ANTEPARTUM: ICD-10-CM

## 2021-03-03 DIAGNOSIS — Z98.890 HISTORY OF LOOP ELECTROSURGICAL EXCISION PROCEDURE (LEEP) OF CERVIX AFFECTING PREGNANCY, ANTEPARTUM: ICD-10-CM

## 2021-03-03 DIAGNOSIS — Z91.199 NONCOMPLIANCE: ICD-10-CM

## 2021-03-03 DIAGNOSIS — Z87.42 HISTORY OF ABNORMAL CERVICAL PAP SMEAR: ICD-10-CM

## 2021-03-03 DIAGNOSIS — Z87.51 HISTORY OF PRETERM DELIVERY: ICD-10-CM

## 2021-03-03 DIAGNOSIS — Z3A.33 33 WEEKS GESTATION OF PREGNANCY: ICD-10-CM

## 2021-03-03 DIAGNOSIS — Z00.00 ANNUAL PHYSICAL EXAM: Primary | ICD-10-CM

## 2021-03-03 PROCEDURE — 99213 OFFICE O/P EST LOW 20 MIN: CPT | Performed by: OBSTETRICS & GYNECOLOGY

## 2021-03-05 PROBLEM — Z00.00 ANNUAL PHYSICAL EXAM: Status: ACTIVE | Noted: 2021-03-05

## 2021-03-05 PROBLEM — Z3A.33 33 WEEKS GESTATION OF PREGNANCY: Status: ACTIVE | Noted: 2021-03-05

## 2021-03-11 ENCOUNTER — LAB (OUTPATIENT)
Dept: LAB | Facility: HOSPITAL | Age: 31
End: 2021-03-11

## 2021-03-11 ENCOUNTER — ROUTINE PRENATAL (OUTPATIENT)
Dept: OBSTETRICS AND GYNECOLOGY | Facility: CLINIC | Age: 31
End: 2021-03-11

## 2021-03-11 VITALS — WEIGHT: 145 LBS | DIASTOLIC BLOOD PRESSURE: 78 MMHG | SYSTOLIC BLOOD PRESSURE: 120 MMHG | BODY MASS INDEX: 26.52 KG/M2

## 2021-03-11 DIAGNOSIS — Z98.890 HISTORY OF LOOP ELECTROSURGICAL EXCISION PROCEDURE (LEEP) OF CERVIX AFFECTING PREGNANCY, ANTEPARTUM: ICD-10-CM

## 2021-03-11 DIAGNOSIS — O34.40 HISTORY OF LOOP ELECTROSURGICAL EXCISION PROCEDURE (LEEP) OF CERVIX AFFECTING PREGNANCY, ANTEPARTUM: ICD-10-CM

## 2021-03-11 DIAGNOSIS — Z3A.35 35 WEEKS GESTATION OF PREGNANCY: ICD-10-CM

## 2021-03-11 DIAGNOSIS — O36.8130 DECREASED FETAL MOVEMENTS IN THIRD TRIMESTER, SINGLE OR UNSPECIFIED FETUS: Primary | ICD-10-CM

## 2021-03-11 DIAGNOSIS — Z87.51 HISTORY OF PRETERM DELIVERY: ICD-10-CM

## 2021-03-11 DIAGNOSIS — Z36.85 ANTENATAL SCREENING FOR STREPTOCOCCUS B: ICD-10-CM

## 2021-03-11 DIAGNOSIS — Z87.42 HISTORY OF ABNORMAL CERVICAL PAP SMEAR: ICD-10-CM

## 2021-03-11 DIAGNOSIS — R82.5 POSITIVE URINE DRUG SCREEN: ICD-10-CM

## 2021-03-11 DIAGNOSIS — O99.331 HIGH RISK PREGNANCY DUE TO SMOKING IN FIRST TRIMESTER: ICD-10-CM

## 2021-03-11 DIAGNOSIS — Z91.199 NONCOMPLIANCE: ICD-10-CM

## 2021-03-11 DIAGNOSIS — O36.8120 DECREASED FETAL MOVEMENTS IN SECOND TRIMESTER, SINGLE OR UNSPECIFIED FETUS: Primary | ICD-10-CM

## 2021-03-11 LAB
DEPRECATED RDW RBC AUTO: 38.8 FL (ref 37–54)
ERYTHROCYTE [DISTWIDTH] IN BLOOD BY AUTOMATED COUNT: 12.7 % (ref 12.3–15.4)
GLUCOSE 1H P 100 G GLC PO SERPL-MCNC: 104 MG/DL (ref 60–140)
HCT VFR BLD AUTO: 35.4 % (ref 34–46.6)
HGB BLD-MCNC: 12.1 G/DL (ref 12–15.9)
MCH RBC QN AUTO: 29.2 PG (ref 26.6–33)
MCHC RBC AUTO-ENTMCNC: 34.2 G/DL (ref 31.5–35.7)
MCV RBC AUTO: 85.3 FL (ref 79–97)
PLATELET # BLD AUTO: 387 10*3/MM3 (ref 140–450)
PMV BLD AUTO: 10.4 FL (ref 6–12)
RBC # BLD AUTO: 4.15 10*6/MM3 (ref 3.77–5.28)
WBC # BLD AUTO: 19.23 10*3/MM3 (ref 3.4–10.8)

## 2021-03-11 PROCEDURE — 36415 COLL VENOUS BLD VENIPUNCTURE: CPT

## 2021-03-11 PROCEDURE — 87653 STREP B DNA AMP PROBE: CPT | Performed by: OBSTETRICS & GYNECOLOGY

## 2021-03-11 PROCEDURE — 85027 COMPLETE CBC AUTOMATED: CPT

## 2021-03-11 PROCEDURE — 99213 OFFICE O/P EST LOW 20 MIN: CPT | Performed by: OBSTETRICS & GYNECOLOGY

## 2021-03-11 PROCEDURE — 82950 GLUCOSE TEST: CPT

## 2021-03-12 ENCOUNTER — HOSPITAL ENCOUNTER (OUTPATIENT)
Facility: HOSPITAL | Age: 31
Discharge: HOME | End: 2021-03-12
Attending: FAMILY MEDICINE | Admitting: FAMILY MEDICINE

## 2021-03-12 VITALS
DIASTOLIC BLOOD PRESSURE: 71 MMHG | HEART RATE: 95 BPM | TEMPERATURE: 98.5 F | SYSTOLIC BLOOD PRESSURE: 114 MMHG | OXYGEN SATURATION: 99 %

## 2021-03-12 LAB
BACTERIA UR QL AUTO: ABNORMAL /HPF
BILIRUB UR QL STRIP: NEGATIVE
CANDIDA ALBICANS: NEGATIVE
CLARITY UR: ABNORMAL
COLOR UR: YELLOW
GARDNERELLA VAGINALIS: POSITIVE
GLUCOSE UR STRIP-MCNC: NEGATIVE MG/DL
GROUP B STREP, DNA: NEGATIVE
HGB UR QL STRIP.AUTO: NEGATIVE
HYALINE CASTS UR QL AUTO: ABNORMAL /LPF
KETONES UR QL STRIP: NEGATIVE
LEUKOCYTE ESTERASE UR QL STRIP.AUTO: ABNORMAL
NITRITE UR QL STRIP: NEGATIVE
PH UR STRIP.AUTO: 6 [PH] (ref 5–9)
PROT UR QL STRIP: NEGATIVE
RBC # UR: ABNORMAL /HPF
REF LAB TEST METHOD: ABNORMAL
SP GR UR STRIP: 1.02 (ref 1–1.03)
SQUAMOUS #/AREA URNS HPF: ABNORMAL /HPF
T VAGINALIS DNA VAG QL PROBE+SIG AMP: NEGATIVE
UROBILINOGEN UR QL STRIP: ABNORMAL
WBC UR QL AUTO: ABNORMAL /HPF

## 2021-03-12 PROCEDURE — 59025 FETAL NON-STRESS TEST: CPT | Performed by: FAMILY MEDICINE

## 2021-03-12 PROCEDURE — 96372 THER/PROPH/DIAG INJ SC/IM: CPT

## 2021-03-12 PROCEDURE — G0463 HOSPITAL OUTPT CLINIC VISIT: HCPCS

## 2021-03-12 PROCEDURE — 87086 URINE CULTURE/COLONY COUNT: CPT | Performed by: FAMILY MEDICINE

## 2021-03-12 PROCEDURE — 87510 GARDNER VAG DNA DIR PROBE: CPT | Performed by: FAMILY MEDICINE

## 2021-03-12 PROCEDURE — 87480 CANDIDA DNA DIR PROBE: CPT | Performed by: FAMILY MEDICINE

## 2021-03-12 PROCEDURE — 25010000002 BETAMETHASONE ACET & SOD PHOS PER 4 MG: Performed by: FAMILY MEDICINE

## 2021-03-12 PROCEDURE — 99213 OFFICE O/P EST LOW 20 MIN: CPT | Performed by: FAMILY MEDICINE

## 2021-03-12 PROCEDURE — 59025 FETAL NON-STRESS TEST: CPT

## 2021-03-12 PROCEDURE — 87660 TRICHOMONAS VAGIN DIR PROBE: CPT | Performed by: FAMILY MEDICINE

## 2021-03-12 PROCEDURE — 81001 URINALYSIS AUTO W/SCOPE: CPT | Performed by: FAMILY MEDICINE

## 2021-03-12 RX ORDER — SODIUM CHLORIDE, SODIUM LACTATE, POTASSIUM CHLORIDE, CALCIUM CHLORIDE 600; 310; 30; 20 MG/100ML; MG/100ML; MG/100ML; MG/100ML
125 INJECTION, SOLUTION INTRAVENOUS CONTINUOUS
Status: DISCONTINUED | OUTPATIENT
Start: 2021-03-12 | End: 2021-03-12 | Stop reason: HOSPADM

## 2021-03-12 RX ORDER — BETAMETHASONE SODIUM PHOSPHATE AND BETAMETHASONE ACETATE 3; 3 MG/ML; MG/ML
12 INJECTION, SUSPENSION INTRA-ARTICULAR; INTRALESIONAL; INTRAMUSCULAR; SOFT TISSUE EVERY 24 HOURS
Status: DISCONTINUED | OUTPATIENT
Start: 2021-03-12 | End: 2021-03-12 | Stop reason: HOSPADM

## 2021-03-12 RX ORDER — METRONIDAZOLE 500 MG/1
2000 TABLET ORAL ONCE
Status: COMPLETED | OUTPATIENT
Start: 2021-03-12 | End: 2021-03-12

## 2021-03-12 RX ORDER — NIFEDIPINE 10 MG/1
10 CAPSULE ORAL ONCE
Status: COMPLETED | OUTPATIENT
Start: 2021-03-12 | End: 2021-03-12

## 2021-03-12 RX ORDER — SODIUM CHLORIDE 0.9 % (FLUSH) 0.9 %
10 SYRINGE (ML) INJECTION AS NEEDED
Status: DISCONTINUED | OUTPATIENT
Start: 2021-03-12 | End: 2021-03-12 | Stop reason: HOSPADM

## 2021-03-12 RX ORDER — TERBUTALINE SULFATE 1 MG/ML
0.25 INJECTION, SOLUTION SUBCUTANEOUS ONCE
Status: DISCONTINUED | OUTPATIENT
Start: 2021-03-12 | End: 2021-03-12

## 2021-03-12 RX ORDER — NIFEDIPINE 10 MG/1
10 CAPSULE ORAL EVERY 8 HOURS SCHEDULED
Status: DISCONTINUED | OUTPATIENT
Start: 2021-03-12 | End: 2021-03-12 | Stop reason: HOSPADM

## 2021-03-12 RX ORDER — SODIUM CHLORIDE 0.9 % (FLUSH) 0.9 %
10 SYRINGE (ML) INJECTION EVERY 12 HOURS SCHEDULED
Status: DISCONTINUED | OUTPATIENT
Start: 2021-03-12 | End: 2021-03-12 | Stop reason: HOSPADM

## 2021-03-12 RX ORDER — SODIUM CHLORIDE, SODIUM LACTATE, POTASSIUM CHLORIDE, CALCIUM CHLORIDE 600; 310; 30; 20 MG/100ML; MG/100ML; MG/100ML; MG/100ML
INJECTION, SOLUTION INTRAVENOUS
Status: COMPLETED
Start: 2021-03-12 | End: 2021-03-12

## 2021-03-12 RX ADMIN — METRONIDAZOLE 2000 MG: 500 TABLET ORAL at 20:14

## 2021-03-12 RX ADMIN — BETAMETHASONE SODIUM PHOSPHATE AND BETAMETHASONE ACETATE 12 MG: 3; 3 INJECTION, SUSPENSION INTRA-ARTICULAR; INTRALESIONAL; INTRAMUSCULAR at 17:21

## 2021-03-12 RX ADMIN — SODIUM CHLORIDE, POTASSIUM CHLORIDE, SODIUM LACTATE AND CALCIUM CHLORIDE 1000 ML: 600; 310; 30; 20 INJECTION, SOLUTION INTRAVENOUS at 17:46

## 2021-03-12 RX ADMIN — NIFEDIPINE 10 MG: 10 CAPSULE ORAL at 18:42

## 2021-03-12 RX ADMIN — SODIUM CHLORIDE, POTASSIUM CHLORIDE, SODIUM LACTATE AND CALCIUM CHLORIDE 1000 ML: 600; 310; 30; 20 INJECTION, SOLUTION INTRAVENOUS at 17:23

## 2021-03-13 ENCOUNTER — HOSPITAL ENCOUNTER (OUTPATIENT)
Facility: HOSPITAL | Age: 31
Discharge: HOME | End: 2021-03-13
Attending: FAMILY MEDICINE | Admitting: FAMILY MEDICINE

## 2021-03-13 PROBLEM — Z36.85 ANTENATAL SCREENING FOR STREPTOCOCCUS B: Status: ACTIVE | Noted: 2021-03-13

## 2021-03-13 PROBLEM — O36.8130 DECREASED FETAL MOVEMENTS IN THIRD TRIMESTER: Status: ACTIVE | Noted: 2021-03-13

## 2021-03-13 PROBLEM — Z3A.35 35 WEEKS GESTATION OF PREGNANCY: Status: ACTIVE | Noted: 2021-03-13

## 2021-03-13 LAB — BACTERIA SPEC AEROBE CULT: NO GROWTH

## 2021-03-13 PROCEDURE — 96372 THER/PROPH/DIAG INJ SC/IM: CPT

## 2021-03-13 PROCEDURE — 25010000002 BETAMETHASONE ACET & SOD PHOS PER 4 MG: Performed by: FAMILY MEDICINE

## 2021-03-13 PROCEDURE — G0463 HOSPITAL OUTPT CLINIC VISIT: HCPCS

## 2021-03-13 RX ORDER — BETAMETHASONE SODIUM PHOSPHATE AND BETAMETHASONE ACETATE 3; 3 MG/ML; MG/ML
12 INJECTION, SUSPENSION INTRA-ARTICULAR; INTRALESIONAL; INTRAMUSCULAR; SOFT TISSUE EVERY 24 HOURS
Status: COMPLETED | OUTPATIENT
Start: 2021-03-13 | End: 2021-03-13

## 2021-03-13 RX ADMIN — BETAMETHASONE SODIUM PHOSPHATE AND BETAMETHASONE ACETATE 12 MG: 3; 3 INJECTION, SUSPENSION INTRA-ARTICULAR; INTRALESIONAL; INTRAMUSCULAR at 17:45

## 2021-03-13 NOTE — PROGRESS NOTES
[facility] OB Triage Note  ?  OB Provider: Harish Johnson MD     S: Pt is a 30 y.o.  at 35w1d with Estimated Date of Delivery: 4/15/21 presents to triage c/o contractions since 11AM. She reports they progressively became more intense & regular prompting her to come to the hospital.  She has a history of PTD at 34wkGA.  She reports not really feeling baby move because she has been christopher. Denies LOF or vaginal bleeding.      Problems (from 20 to present)     Problem Noted Resolved    Noncompliance 2/3/2021 by Harish Johnson MD No    History of abnormal cervical Pap smear 2020 by Harish Johnson MD No    Positive urine drug screen 2020 by Harish Johnson MD No    High risk pregnancy due to smoking in first trimester 2020 by Harish Johnson MD No    History of LEEP (loop electrosurgical excision procedure) of cervix complicating pregnancy 2020 by Harish Johnson MD No    History of  delivery 2018 by Harish Johnson MD No          OB History        5    Para   2    Term   1       1    AB   2    Living   2       SAB   2    TAB        Ectopic        Molar        Multiple   0    Live Births   2              Prior to Admission medications    Medication Sig Start Date End Date Taking? Authorizing Provider   progesterone (PROMETRIUM) 100 MG capsule Insert 1 capsule into the vagina Every Night. 20   Provider, MD Arturo     Past Medical History:   Diagnosis Date   • Abnormal Pap smear of cervix    • Anxiety    • Asthma    • Cervical dysplasia    • Chlamydia    • Cyst of ovary    • Palpitations    • Tobacco dependence syndrome    • URI (upper respiratory infection)    • Urogenital trichomoniasis    • Varicella      Past Surgical History:   Procedure Laterality Date   • CERVICAL BIOPSY  W/ LOOP ELECTRODE EXCISION     • ENDOSCOPY  2012    Normal esophagus. Gastritis in stomach. Biopsy taken. Normal duodenum. Biopsy taken.   •  ENDOSCOPY AND COLONOSCOPY  01/27/2012    Colitis in rectum. Biopsy taken.   • MULTIPLE TOOTH EXTRACTIONS        reports that she has been smoking cigarettes. She has been smoking about 1.00 pack per day. She has never used smokeless tobacco. She reports previous alcohol use. She reports that she does not use drugs.  Family History   Problem Relation Age of Onset   • Colon cancer Father    • Coronary artery disease Father    • Hypertension Father    • Kidney disease Father    • Thyroid disease Father    • Thyroid disease Sister    • Coronary artery disease Paternal Grandfather    • Coronary artery disease Maternal Grandmother    • COPD Maternal Grandmother    • Coronary artery disease Maternal Grandfather    • Heart disease Maternal Grandfather    • Heart attack Maternal Grandfather    • No Known Problems Mother    • Heart defect Son    • No Known Problems Daughter    • Dementia Paternal Grandmother      No Known Allergies    ROS: Review of Systems   Constitutional: Negative for chills and fever.   Eyes: Negative for photophobia and visual disturbance.   Respiratory: Negative for cough and shortness of breath.    Cardiovascular: Negative for chest pain, palpitations and leg swelling.   Gastrointestinal: Negative for abdominal pain, diarrhea, nausea and vomiting.   Genitourinary: Negative for dysuria, vaginal bleeding and vaginal discharge.   Skin: Negative for color change and rash.   Neurological: Negative for dizziness, light-headedness and headache.   Hematological: Negative for adenopathy. Does not bruise/bleed easily.   Psychiatric/Behavioral: Negative for depressed mood. The patient is not nervous/anxious.    All other systems reviewed and are negative.     ?  O: /77   Pulse 103   Temp 98.5 °F (36.9 °C) (Oral)   LMP 07/09/2020 (Exact Date)   SpO2 99%    Gen: Well-appearing, well-developed, well hydrated, well nourished, no acute distress, alert and oriented, appears stated age, comfortable, pleasant,  cooperative  Abd: soft, gravid; FHT present  Ext: no edema  Psych: A & Ox3; judgement and insight intact; memory both long term and short term intact. Mood and affect are appropriate.    Cervix: Dilation: 1.5 cm per RN     FHT: Baseline: 135 bpm, Variability: Moderate (6-25bpm), Accelerations: 2 15x15 accels present in 20 minutes, Decelerations: Absent   Category 1 tracing   Contractions: Frequency: Every 2-3 minutes on arrival; contractions spaced out after 1L fluid bolus but then pt started christopher every 1-2 minutes.     Lab Results (last 24 hours)     Procedure Component Value Units Date/Time    Gardnerella vaginalis, Trichomonas vaginalis, Candida albicans, DNA - Swab, Vagina [[id number]] Collected: 21    Specimen: Swab from Vagina Updated: 21     JIN ALBICANS Negative     GARDNERELLA VAGINALIS Positive     TRICHOMONAS VAGINALIS Negative    Urinalysis With Microscopic If Indicated (No Culture) - Urine, Clean Catch [[id number]]  (Abnormal) Collected: 21 160    Specimen: Urine, Clean Catch Updated: 21 1626     Color, UA Yellow     Appearance, UA Cloudy     pH, UA 6.0     Specific Gravity, UA 1.023     Glucose, UA Negative     Ketones, UA Negative     Bilirubin, UA Negative     Blood, UA Negative     Protein, UA Negative     Leuk Esterase, UA Small (1+)     Nitrite, UA Negative     Urobilinogen, UA 1.0 E.U./dL    Urinalysis, Microscopic Only - Urine, Clean Catch [[id number]]  (Abnormal) Collected: 21 1604    Specimen: Urine, Clean Catch Updated: 21 1626     RBC, UA 13-20 /HPF      WBC, UA Too Numerous to Count /HPF      Bacteria, UA 1+ /HPF      Squamous Epithelial Cells, UA 13-20 /HPF      Hyaline Casts, UA 7-12 /LPF      Methodology Automated Microscopy           A/P: 30 y.o.  35w1d with  contractions  IVF bolus 1000mg given, contractions spaced out for about 30 minutes but returned  - Celestone #1 given 3/12/21  - Pt has h/o asthma, brethine  contraindicated; give Procardia 10mg PO x1.  - No cervical change after 1hr 45min.  - Vaginosis panel: + for BV, treated with flagyl  - Pt noted to have contractions space out & only uterine irritability noted on monitor; as she had no cervical change she was discharged home with instructions to come back tomorrow for Celestone #2    Signature  Soha Sena MD  [address], [place] [address]  Office: [phone]      This document has been electronically signed by Soha Sena MD on March 12, 2021 20:56 CST

## 2021-03-13 NOTE — NON STRESS TEST
Sebastian Alas, a  at 35w1d with an ALYSSA of 4/15/2021, by Last Menstrual Period, was seen at [facility] for a nonstress test.    No chief complaint on file.      Patient Active Problem List   Diagnosis   • History of  delivery   • History of abnormal cervical Pap smear   • Positive urine drug screen   • 8 weeks gestation of pregnancy   • High risk pregnancy due to smoking in first trimester   • History of LEEP (loop electrosurgical excision procedure) of cervix complicating pregnancy   • Poor fetal growth affecting management of mother in first trimester, fetus 1   • 11 weeks gestation of pregnancy   • 14 weeks gestation of pregnancy   • Neck pain   • Short cervix during pregnancy in second trimester   • 21 weeks gestation of pregnancy   • 23 weeks gestation of pregnancy   •  contractions   • 26 weeks gestation of pregnancy   • Noncompliance   • 29 weeks gestation of pregnancy   • Fetal size inconsistent with dates   • Decreased fetal movements in second trimester   • 32 weeks gestation of pregnancy   • Annual physical exam   • 33 weeks gestation of pregnancy       Start Time:   Stop Time:     Interpretation A  Nonstress Test Interpretation A: Reactive (21 2000 : Joyce Dover, RN)  Comments A: ANDRES Mathur RN (21 2000 : Joyce Dover, RN)      Pt c/o contractions. Betamethzone x1, Vag panel + BV treated with flagyl, sent home with instructions to return 3/13/2021 5pm for second dose betamethazone. Culture sent on urine-pending

## 2021-03-13 NOTE — PROGRESS NOTES
CC: Prenatal visit    Sebastian Alas is a 30 y.o.  at 35w2d.  Doing well.  Denies contractions, LOF, or VB.  Reports good FM.    /78   Wt 65.8 kg (145 lb)   LMP 2020 (Exact Date)   BMI 26.52 kg/m²   SVE: 1  Fundal Height (cm): 35 cm  Fetal Heart Rate: 158     Problems (from 20 to present)     Problem Noted Resolved    Noncompliance 2/3/2021 by Harish Johnson MD No    Priority:  High      History of abnormal cervical Pap smear 2020 by Harish Johnson MD No    Priority:  High      Positive urine drug screen 2020 by Harish Johnson MD No    Priority:  High      High risk pregnancy due to smoking in first trimester 2020 by aHrish Johnson MD No    Priority:  High      History of LEEP (loop electrosurgical excision procedure) of cervix complicating pregnancy 2020 by Harish Johnson MD No    Priority:  High      History of  delivery 2018 by Harish Johnson MD No    Priority:  High            A/P: Sebastian Alas is a 30 y.o.  at 35w2d.  - RTC in 1 weeks  - Reviewed COVID-19 visitation policy  - Reviewed COVID-19 precautions     Diagnosis Plan   1. Decreased fetal movements in third trimester, single or unspecified fetus  US Fetal Biophysical Profile;Without Non-Stress Testing biophysical profile reassuring 8 of 8 patient instructed at length on kick counts.   2. Noncompliance   refuses further 17 hydroxyprogesterone injection   3. History of abnormal cervical Pap smear     4. High risk pregnancy due to smoking in first trimester     5. History of loop electrosurgical excision procedure (LEEP) of cervix affecting pregnancy, antepartum     6. Positive urine drug screen     7. History of  delivery     8.  screening for streptococcus B  Group B Strep (Molecular) - Swab, Vaginal/Rectum   9. 35 weeks gestation of pregnancy       Harish Johnson MD  3/13/2021  14:12 CST

## 2021-03-18 ENCOUNTER — ROUTINE PRENATAL (OUTPATIENT)
Dept: OBSTETRICS AND GYNECOLOGY | Facility: CLINIC | Age: 31
End: 2021-03-18

## 2021-03-18 VITALS — WEIGHT: 145.2 LBS | BODY MASS INDEX: 26.56 KG/M2 | SYSTOLIC BLOOD PRESSURE: 108 MMHG | DIASTOLIC BLOOD PRESSURE: 64 MMHG

## 2021-03-18 DIAGNOSIS — Z87.42 HISTORY OF ABNORMAL CERVICAL PAP SMEAR: ICD-10-CM

## 2021-03-18 DIAGNOSIS — O34.40 HISTORY OF LOOP ELECTROSURGICAL EXCISION PROCEDURE (LEEP) OF CERVIX AFFECTING PREGNANCY, ANTEPARTUM: ICD-10-CM

## 2021-03-18 DIAGNOSIS — Z87.51 HISTORY OF PRETERM DELIVERY: ICD-10-CM

## 2021-03-18 DIAGNOSIS — Z3A.36 36 WEEKS GESTATION OF PREGNANCY: Primary | ICD-10-CM

## 2021-03-18 DIAGNOSIS — Z91.199 NONCOMPLIANCE: ICD-10-CM

## 2021-03-18 DIAGNOSIS — Z98.890 HISTORY OF LOOP ELECTROSURGICAL EXCISION PROCEDURE (LEEP) OF CERVIX AFFECTING PREGNANCY, ANTEPARTUM: ICD-10-CM

## 2021-03-18 DIAGNOSIS — O99.331 HIGH RISK PREGNANCY DUE TO SMOKING IN FIRST TRIMESTER: ICD-10-CM

## 2021-03-18 DIAGNOSIS — R82.5 POSITIVE URINE DRUG SCREEN: ICD-10-CM

## 2021-03-18 PROCEDURE — 99213 OFFICE O/P EST LOW 20 MIN: CPT | Performed by: OBSTETRICS & GYNECOLOGY

## 2021-03-18 RX ORDER — HYDROCORTISONE ACETATE 25 MG/1
25 SUPPOSITORY RECTAL 2 TIMES DAILY
Qty: 12 EACH | Refills: 0 | Status: SHIPPED | OUTPATIENT
Start: 2021-03-18 | End: 2021-05-19

## 2021-03-19 PROBLEM — Z3A.36 36 WEEKS GESTATION OF PREGNANCY: Status: ACTIVE | Noted: 2021-03-19

## 2021-03-19 NOTE — PROGRESS NOTES
CC: Prenatal visit    Sebastian Alas is a 30 y.o.  at 36w1d.  Doing well.  Denies contractions, LOF, or VB.  Reports good FM.    /64   Wt 65.9 kg (145 lb 3.2 oz)   LMP 2020 (Exact Date)   BMI 26.56 kg/m²      Fundal Height (cm): 37 cm  Fetal Heart Rate: 160     Problems (from 20 to present)     Problem Noted Resolved    Noncompliance 2/3/2021 by Harish Johnson MD No    Priority:  High      History of abnormal cervical Pap smear 2020 by Harish Johnson MD No    Priority:  High      Positive urine drug screen 2020 by Harish Johnson MD No    Priority:  High      High risk pregnancy due to smoking in first trimester 2020 by Harish Johnson MD No    Priority:  High      History of LEEP (loop electrosurgical excision procedure) of cervix complicating pregnancy 2020 by Harish Johnson MD No    Priority:  High      History of  delivery 2018 by Harish Johnson MD No    Priority:  High            A/P: Sebastian Alas is a 30 y.o.  at 36w1d.  - RTC in 1 weeks  - Reviewed COVID-19 visitation policy  - Reviewed COVID-19 precautions     Diagnosis Plan   1. 36 weeks gestation of pregnancy     2. Noncompliance     3. History of abnormal cervical Pap smear     4. High risk pregnancy due to smoking in first trimester     5. History of loop electrosurgical excision procedure (LEEP) of cervix affecting pregnancy, antepartum     6. Positive urine drug screen     7. History of  delivery     Patient to labor and delivery for  contractions.  Is now 2 cm.  Not really christopher now we will plan for elective induction of labor if does not deliver by 2021.  Last hemoglobin was 12.1 on 311.  Discussed signs and symptoms of  labor  Harish Johnson MD  3/19/2021  11:16 CDT

## 2021-03-25 ENCOUNTER — ROUTINE PRENATAL (OUTPATIENT)
Dept: OBSTETRICS AND GYNECOLOGY | Facility: CLINIC | Age: 31
End: 2021-03-25

## 2021-03-25 DIAGNOSIS — O26.843 SIZE OF FETUS INCONSISTENT WITH DATES IN THIRD TRIMESTER: ICD-10-CM

## 2021-03-25 DIAGNOSIS — Z87.42 HISTORY OF ABNORMAL CERVICAL PAP SMEAR: ICD-10-CM

## 2021-03-25 DIAGNOSIS — Z98.890 HISTORY OF LOOP ELECTROSURGICAL EXCISION PROCEDURE (LEEP) OF CERVIX AFFECTING PREGNANCY IN THIRD TRIMESTER: ICD-10-CM

## 2021-03-25 DIAGNOSIS — O40.3XX0 POLYHYDRAMNIOS IN THIRD TRIMESTER COMPLICATION, SINGLE OR UNSPECIFIED FETUS: Primary | ICD-10-CM

## 2021-03-25 DIAGNOSIS — Z91.199 NONCOMPLIANCE: ICD-10-CM

## 2021-03-25 DIAGNOSIS — Z3A.37 37 WEEKS GESTATION OF PREGNANCY: ICD-10-CM

## 2021-03-25 DIAGNOSIS — Z87.51 HISTORY OF PRETERM DELIVERY: ICD-10-CM

## 2021-03-25 DIAGNOSIS — R82.5 POSITIVE URINE DRUG SCREEN: ICD-10-CM

## 2021-03-25 DIAGNOSIS — O34.43 HISTORY OF LOOP ELECTROSURGICAL EXCISION PROCEDURE (LEEP) OF CERVIX AFFECTING PREGNANCY IN THIRD TRIMESTER: ICD-10-CM

## 2021-03-25 DIAGNOSIS — O99.331 HIGH RISK PREGNANCY DUE TO SMOKING IN FIRST TRIMESTER: ICD-10-CM

## 2021-03-25 PROCEDURE — 99213 OFFICE O/P EST LOW 20 MIN: CPT | Performed by: OBSTETRICS & GYNECOLOGY

## 2021-03-27 VITALS — DIASTOLIC BLOOD PRESSURE: 70 MMHG | BODY MASS INDEX: 27.18 KG/M2 | WEIGHT: 148.6 LBS | SYSTOLIC BLOOD PRESSURE: 112 MMHG

## 2021-03-27 PROBLEM — O40.3XX0 POLYHYDRAMNIOS IN THIRD TRIMESTER: Status: ACTIVE | Noted: 2021-03-27

## 2021-03-27 PROBLEM — O26.843 SIZE OF FETUS INCONSISTENT WITH DATES IN THIRD TRIMESTER: Status: ACTIVE | Noted: 2021-03-27

## 2021-03-27 PROBLEM — Z3A.37 37 WEEKS GESTATION OF PREGNANCY: Status: ACTIVE | Noted: 2021-03-27

## 2021-03-27 NOTE — PROGRESS NOTES
CC: Prenatal visit    Sebastian Alas is a 30 y.o.  at 37w2d.  Doing well.  Denies contractions, LOF, or VB.  Reports good FM.    /70   Wt 67.4 kg (148 lb 9.6 oz)   LMP 2020 (Exact Date)   BMI 27.18 kg/m²   SVE: Not done  Fundal Height (cm): 37 cm  Fetal Heart Rate: 148     Problems (from 20 to present)     Problem Noted Resolved    Noncompliance 2/3/2021 by Harish Johnson MD No    Priority:  High      History of abnormal cervical Pap smear 2020 by Harish Johnson MD No    Priority:  High      Positive urine drug screen 2020 by Harish Johnson MD No    Priority:  High      High risk pregnancy due to smoking in first trimester 2020 by Harish Johnson MD No    Priority:  High      History of LEEP (loop electrosurgical excision procedure) of cervix complicating pregnancy 2020 by Harish Johnson MD No    Priority:  High      History of  delivery 2018 by Harish Johnson MD No    Priority:  High            A/P: Sebastian Alas is a 30 y.o.  at 37w2d.  - RTC in 1 weeks  - Reviewed COVID-19 visitation policy  - Reviewed COVID-19 precautions     Diagnosis Plan   1. Polyhydramnios in third trimester complication, single or unspecified fetus  M OB US MAD borderline polyhydramnios on scan today at 25.5.  Will repeat biophysical profile in 1 week strict kick counts.  I reviewed images and discussed with sonographer   2. Noncompliance     3. History of abnormal cervical Pap smear     4. High risk pregnancy due to smoking in first trimester     5. History of loop electrosurgical excision procedure (LEEP) of cervix affecting pregnancy in third trimester     6. Positive urine drug screen     7. History of  delivery     8. Size of fetus inconsistent with dates in third trimester  MFM OB US MAD   9. 37 weeks gestation of pregnancy       Harish Johnson MD  3/27/2021  13:50 CDT

## 2021-04-01 ENCOUNTER — ROUTINE PRENATAL (OUTPATIENT)
Dept: OBSTETRICS AND GYNECOLOGY | Facility: CLINIC | Age: 31
End: 2021-04-01

## 2021-04-01 VITALS — SYSTOLIC BLOOD PRESSURE: 110 MMHG | DIASTOLIC BLOOD PRESSURE: 70 MMHG | BODY MASS INDEX: 27.36 KG/M2 | WEIGHT: 149.6 LBS

## 2021-04-01 DIAGNOSIS — Z3A.38 38 WEEKS GESTATION OF PREGNANCY: Primary | ICD-10-CM

## 2021-04-01 DIAGNOSIS — Z91.199 NONCOMPLIANCE: ICD-10-CM

## 2021-04-01 DIAGNOSIS — Z98.890 HISTORY OF LOOP ELECTROSURGICAL EXCISION PROCEDURE (LEEP) OF CERVIX AFFECTING PREGNANCY IN THIRD TRIMESTER: ICD-10-CM

## 2021-04-01 DIAGNOSIS — Z87.51 HISTORY OF PRETERM DELIVERY: ICD-10-CM

## 2021-04-01 DIAGNOSIS — O34.43 HISTORY OF LOOP ELECTROSURGICAL EXCISION PROCEDURE (LEEP) OF CERVIX AFFECTING PREGNANCY IN THIRD TRIMESTER: ICD-10-CM

## 2021-04-01 DIAGNOSIS — Z87.42 HISTORY OF ABNORMAL CERVICAL PAP SMEAR: ICD-10-CM

## 2021-04-01 DIAGNOSIS — O99.331 HIGH RISK PREGNANCY DUE TO SMOKING IN FIRST TRIMESTER: ICD-10-CM

## 2021-04-01 DIAGNOSIS — R82.5 POSITIVE URINE DRUG SCREEN: ICD-10-CM

## 2021-04-01 PROCEDURE — 99213 OFFICE O/P EST LOW 20 MIN: CPT | Performed by: OBSTETRICS & GYNECOLOGY

## 2021-04-02 ENCOUNTER — ANESTHESIA (OUTPATIENT)
Dept: LABOR AND DELIVERY | Facility: HOSPITAL | Age: 31
End: 2021-04-02

## 2021-04-02 ENCOUNTER — HOSPITAL ENCOUNTER (INPATIENT)
Facility: HOSPITAL | Age: 31
LOS: 1 days | Discharge: HOME | End: 2021-04-03
Attending: OBSTETRICS & GYNECOLOGY | Admitting: OBSTETRICS & GYNECOLOGY

## 2021-04-02 ENCOUNTER — ANESTHESIA EVENT (OUTPATIENT)
Dept: LABOR AND DELIVERY | Facility: HOSPITAL | Age: 31
End: 2021-04-02

## 2021-04-02 PROBLEM — O36.8130 DECREASED FETAL MOVEMENTS IN THIRD TRIMESTER: Status: RESOLVED | Noted: 2021-03-13 | Resolved: 2021-04-02

## 2021-04-02 PROBLEM — O47.00 PRETERM CONTRACTIONS: Status: RESOLVED | Noted: 2021-01-14 | Resolved: 2021-04-02

## 2021-04-02 PROBLEM — Z3A.35 35 WEEKS GESTATION OF PREGNANCY: Status: RESOLVED | Noted: 2021-03-13 | Resolved: 2021-04-02

## 2021-04-02 PROBLEM — Z3A.26 26 WEEKS GESTATION OF PREGNANCY: Status: RESOLVED | Noted: 2021-01-14 | Resolved: 2021-04-02

## 2021-04-02 PROBLEM — Z3A.36 36 WEEKS GESTATION OF PREGNANCY: Status: RESOLVED | Noted: 2021-03-19 | Resolved: 2021-04-02

## 2021-04-02 PROBLEM — Z36.85 ANTENATAL SCREENING FOR STREPTOCOCCUS B: Status: RESOLVED | Noted: 2021-03-13 | Resolved: 2021-04-02

## 2021-04-02 PROBLEM — Z37.9 NORMAL LABOR: Status: ACTIVE | Noted: 2021-04-02

## 2021-04-02 PROBLEM — Z00.00 ANNUAL PHYSICAL EXAM: Status: RESOLVED | Noted: 2021-03-05 | Resolved: 2021-04-02

## 2021-04-02 PROBLEM — Z3A.38 38 WEEKS GESTATION OF PREGNANCY: Status: ACTIVE | Noted: 2021-04-02

## 2021-04-02 PROBLEM — O99.333 TOBACCO SMOKING AFFECTING PREGNANCY IN THIRD TRIMESTER: Status: ACTIVE | Noted: 2020-09-01

## 2021-04-02 PROBLEM — Z3A.32 32 WEEKS GESTATION OF PREGNANCY: Status: RESOLVED | Noted: 2021-02-27 | Resolved: 2021-04-02

## 2021-04-02 PROBLEM — O36.5911: Status: RESOLVED | Noted: 2020-09-28 | Resolved: 2021-04-02

## 2021-04-02 PROBLEM — O26.872 SHORT CERVIX DURING PREGNANCY IN SECOND TRIMESTER: Status: RESOLVED | Noted: 2020-11-24 | Resolved: 2021-04-02

## 2021-04-02 PROBLEM — Z3A.11 11 WEEKS GESTATION OF PREGNANCY: Status: RESOLVED | Noted: 2020-09-28 | Resolved: 2021-04-02

## 2021-04-02 PROBLEM — Z3A.14 14 WEEKS GESTATION OF PREGNANCY: Status: RESOLVED | Noted: 2020-10-19 | Resolved: 2021-04-02

## 2021-04-02 PROBLEM — Z3A.38 38 WEEKS GESTATION OF PREGNANCY: Status: RESOLVED | Noted: 2021-04-02 | Resolved: 2021-04-02

## 2021-04-02 PROBLEM — O26.843 SIZE OF FETUS INCONSISTENT WITH DATES IN THIRD TRIMESTER: Status: RESOLVED | Noted: 2021-03-27 | Resolved: 2021-04-02

## 2021-04-02 PROBLEM — Z3A.08 8 WEEKS GESTATION OF PREGNANCY: Status: RESOLVED | Noted: 2020-09-01 | Resolved: 2021-04-02

## 2021-04-02 PROBLEM — O36.8120 DECREASED FETAL MOVEMENTS IN SECOND TRIMESTER: Status: RESOLVED | Noted: 2021-02-27 | Resolved: 2021-04-02

## 2021-04-02 PROBLEM — Z3A.37 37 WEEKS GESTATION OF PREGNANCY: Status: RESOLVED | Noted: 2021-03-27 | Resolved: 2021-04-02

## 2021-04-02 PROBLEM — Z3A.29 29 WEEKS GESTATION OF PREGNANCY: Status: RESOLVED | Noted: 2021-02-03 | Resolved: 2021-04-02

## 2021-04-02 PROBLEM — Z3A.33 33 WEEKS GESTATION OF PREGNANCY: Status: RESOLVED | Noted: 2021-03-05 | Resolved: 2021-04-02

## 2021-04-02 PROBLEM — Z3A.23 23 WEEKS GESTATION OF PREGNANCY: Status: RESOLVED | Noted: 2020-12-18 | Resolved: 2021-04-02

## 2021-04-02 PROBLEM — Z3A.21 21 WEEKS GESTATION OF PREGNANCY: Status: RESOLVED | Noted: 2020-12-04 | Resolved: 2021-04-02

## 2021-04-02 PROBLEM — O26.849 FETAL SIZE INCONSISTENT WITH DATES: Status: RESOLVED | Noted: 2021-02-25 | Resolved: 2021-04-02

## 2021-04-02 PROBLEM — M54.2 NECK PAIN: Status: RESOLVED | Noted: 2020-10-19 | Resolved: 2021-04-02

## 2021-04-02 LAB
ABO GROUP BLD: NORMAL
AMPHET+METHAMPHET UR QL: NEGATIVE
AMPHETAMINES UR QL: NEGATIVE
BARBITURATES UR QL SCN: NEGATIVE
BENZODIAZ UR QL SCN: NEGATIVE
BLD GP AB SCN SERPL QL: NEGATIVE
BUPRENORPHINE SERPL-MCNC: NEGATIVE NG/ML
CANNABINOIDS SERPL QL: NEGATIVE
COCAINE UR QL: NEGATIVE
DEPRECATED RDW RBC AUTO: 41.4 FL (ref 37–54)
ERYTHROCYTE [DISTWIDTH] IN BLOOD BY AUTOMATED COUNT: 13.5 % (ref 12.3–15.4)
HCT VFR BLD AUTO: 36.1 % (ref 34–46.6)
HGB BLD-MCNC: 11.9 G/DL (ref 12–15.9)
Lab: NORMAL
MCH RBC QN AUTO: 27.9 PG (ref 26.6–33)
MCHC RBC AUTO-ENTMCNC: 33 G/DL (ref 31.5–35.7)
MCV RBC AUTO: 84.5 FL (ref 79–97)
METHADONE UR QL SCN: NEGATIVE
OPIATES UR QL: NEGATIVE
OXYCODONE UR QL SCN: NEGATIVE
PCP UR QL SCN: NEGATIVE
PLATELET # BLD AUTO: 373 10*3/MM3 (ref 140–450)
PMV BLD AUTO: 10.1 FL (ref 6–12)
PROPOXYPH UR QL: NEGATIVE
RBC # BLD AUTO: 4.27 10*6/MM3 (ref 3.77–5.28)
RH BLD: POSITIVE
T&S EXPIRATION DATE: NORMAL
TRICYCLICS UR QL SCN: NEGATIVE
WBC # BLD AUTO: 20.5 10*3/MM3 (ref 3.4–10.8)

## 2021-04-02 PROCEDURE — 51703 INSERT BLADDER CATH COMPLEX: CPT

## 2021-04-02 PROCEDURE — 3E033VJ INTRODUCTION OF OTHER HORMONE INTO PERIPHERAL VEIN, PERCUTANEOUS APPROACH: ICD-10-PCS | Performed by: OBSTETRICS & GYNECOLOGY

## 2021-04-02 PROCEDURE — 85027 COMPLETE CBC AUTOMATED: CPT | Performed by: OBSTETRICS & GYNECOLOGY

## 2021-04-02 PROCEDURE — 86850 RBC ANTIBODY SCREEN: CPT | Performed by: OBSTETRICS & GYNECOLOGY

## 2021-04-02 PROCEDURE — S0260 H&P FOR SURGERY: HCPCS | Performed by: OBSTETRICS & GYNECOLOGY

## 2021-04-02 PROCEDURE — 86900 BLOOD TYPING SEROLOGIC ABO: CPT | Performed by: OBSTETRICS & GYNECOLOGY

## 2021-04-02 PROCEDURE — C1755 CATHETER, INTRASPINAL: HCPCS

## 2021-04-02 PROCEDURE — 25010000002 FENTANYL CITRATE (PF) 100 MCG/2ML SOLUTION: Performed by: NURSE ANESTHETIST, CERTIFIED REGISTERED

## 2021-04-02 PROCEDURE — 59410 OBSTETRICAL CARE: CPT | Performed by: OBSTETRICS & GYNECOLOGY

## 2021-04-02 PROCEDURE — C1755 CATHETER, INTRASPINAL: HCPCS | Performed by: NURSE ANESTHETIST, CERTIFIED REGISTERED

## 2021-04-02 PROCEDURE — 86901 BLOOD TYPING SEROLOGIC RH(D): CPT | Performed by: OBSTETRICS & GYNECOLOGY

## 2021-04-02 PROCEDURE — 80306 DRUG TEST PRSMV INSTRMNT: CPT | Performed by: OBSTETRICS & GYNECOLOGY

## 2021-04-02 PROCEDURE — 6A550ZT PHERESIS OF CORD BLOOD STEM CELLS, SINGLE: ICD-10-PCS | Performed by: OBSTETRICS & GYNECOLOGY

## 2021-04-02 RX ORDER — ONDANSETRON 2 MG/ML
4 INJECTION INTRAMUSCULAR; INTRAVENOUS ONCE AS NEEDED
Status: DISCONTINUED | OUTPATIENT
Start: 2021-04-02 | End: 2021-04-02 | Stop reason: HOSPADM

## 2021-04-02 RX ORDER — FENTANYL CITRATE 50 UG/ML
INJECTION, SOLUTION INTRAMUSCULAR; INTRAVENOUS AS NEEDED
Status: DISCONTINUED | OUTPATIENT
Start: 2021-04-02 | End: 2021-04-02 | Stop reason: SURG

## 2021-04-02 RX ORDER — MISOPROSTOL 200 UG/1
800 TABLET ORAL AS NEEDED
Status: DISCONTINUED | OUTPATIENT
Start: 2021-04-02 | End: 2021-04-02 | Stop reason: HOSPADM

## 2021-04-02 RX ORDER — OXYTOCIN/0.9 % SODIUM CHLORIDE 30/500 ML
85 PLASTIC BAG, INJECTION (ML) INTRAVENOUS ONCE
Status: DISCONTINUED | OUTPATIENT
Start: 2021-04-02 | End: 2021-04-02 | Stop reason: HOSPADM

## 2021-04-02 RX ORDER — SODIUM CHLORIDE 0.9 % (FLUSH) 0.9 %
3-10 SYRINGE (ML) INJECTION AS NEEDED
Status: DISCONTINUED | OUTPATIENT
Start: 2021-04-02 | End: 2021-04-02 | Stop reason: HOSPADM

## 2021-04-02 RX ORDER — ONDANSETRON 2 MG/ML
4 INJECTION INTRAMUSCULAR; INTRAVENOUS EVERY 6 HOURS PRN
Status: DISCONTINUED | OUTPATIENT
Start: 2021-04-02 | End: 2021-04-02 | Stop reason: HOSPADM

## 2021-04-02 RX ORDER — METHYLERGONOVINE MALEATE 0.2 MG/ML
200 INJECTION INTRAVENOUS ONCE AS NEEDED
Status: DISCONTINUED | OUTPATIENT
Start: 2021-04-02 | End: 2021-04-02 | Stop reason: HOSPADM

## 2021-04-02 RX ORDER — ONDANSETRON 4 MG/1
4 TABLET, FILM COATED ORAL EVERY 6 HOURS PRN
Status: DISCONTINUED | OUTPATIENT
Start: 2021-04-02 | End: 2021-04-02 | Stop reason: HOSPADM

## 2021-04-02 RX ORDER — PROMETHAZINE HYDROCHLORIDE 25 MG/1
25 TABLET ORAL EVERY 6 HOURS PRN
Status: DISCONTINUED | OUTPATIENT
Start: 2021-04-02 | End: 2021-04-02 | Stop reason: HOSPADM

## 2021-04-02 RX ORDER — LIDOCAINE HYDROCHLORIDE 10 MG/ML
5 INJECTION, SOLUTION EPIDURAL; INFILTRATION; INTRACAUDAL; PERINEURAL AS NEEDED
Status: DISCONTINUED | OUTPATIENT
Start: 2021-04-02 | End: 2021-04-02 | Stop reason: HOSPADM

## 2021-04-02 RX ORDER — SODIUM CHLORIDE 0.9 % (FLUSH) 0.9 %
3 SYRINGE (ML) INJECTION EVERY 12 HOURS SCHEDULED
Status: DISCONTINUED | OUTPATIENT
Start: 2021-04-02 | End: 2021-04-02 | Stop reason: HOSPADM

## 2021-04-02 RX ORDER — ACETAMINOPHEN 500 MG
1000 TABLET ORAL EVERY 6 HOURS
Status: DISCONTINUED | OUTPATIENT
Start: 2021-04-02 | End: 2021-04-02 | Stop reason: HOSPADM

## 2021-04-02 RX ORDER — DIPHENHYDRAMINE HYDROCHLORIDE 50 MG/ML
12.5 INJECTION INTRAMUSCULAR; INTRAVENOUS EVERY 8 HOURS PRN
Status: DISCONTINUED | OUTPATIENT
Start: 2021-04-02 | End: 2021-04-02 | Stop reason: HOSPADM

## 2021-04-02 RX ORDER — BUPIVACAINE HYDROCHLORIDE 2.5 MG/ML
INJECTION, SOLUTION EPIDURAL; INFILTRATION; INTRACAUDAL AS NEEDED
Status: DISCONTINUED | OUTPATIENT
Start: 2021-04-02 | End: 2021-04-02 | Stop reason: SURG

## 2021-04-02 RX ORDER — PROMETHAZINE HYDROCHLORIDE 12.5 MG/1
12.5 SUPPOSITORY RECTAL EVERY 6 HOURS PRN
Status: DISCONTINUED | OUTPATIENT
Start: 2021-04-02 | End: 2021-04-02 | Stop reason: HOSPADM

## 2021-04-02 RX ORDER — LIDOCAINE HYDROCHLORIDE AND EPINEPHRINE 15; 5 MG/ML; UG/ML
INJECTION, SOLUTION EPIDURAL AS NEEDED
Status: DISCONTINUED | OUTPATIENT
Start: 2021-04-02 | End: 2021-04-02 | Stop reason: SURG

## 2021-04-02 RX ORDER — OXYTOCIN/0.9 % SODIUM CHLORIDE 30/500 ML
650 PLASTIC BAG, INJECTION (ML) INTRAVENOUS ONCE
Status: COMPLETED | OUTPATIENT
Start: 2021-04-02 | End: 2021-04-02

## 2021-04-02 RX ORDER — CARBOPROST TROMETHAMINE 250 UG/ML
250 INJECTION, SOLUTION INTRAMUSCULAR AS NEEDED
Status: DISCONTINUED | OUTPATIENT
Start: 2021-04-02 | End: 2021-04-02 | Stop reason: HOSPADM

## 2021-04-02 RX ORDER — SODIUM CHLORIDE, SODIUM LACTATE, POTASSIUM CHLORIDE, CALCIUM CHLORIDE 600; 310; 30; 20 MG/100ML; MG/100ML; MG/100ML; MG/100ML
125 INJECTION, SOLUTION INTRAVENOUS CONTINUOUS
Status: DISCONTINUED | OUTPATIENT
Start: 2021-04-02 | End: 2021-04-03

## 2021-04-02 RX ORDER — EPHEDRINE SULFATE 50 MG/ML
5 INJECTION, SOLUTION INTRAVENOUS
Status: DISCONTINUED | OUTPATIENT
Start: 2021-04-02 | End: 2021-04-02 | Stop reason: HOSPADM

## 2021-04-02 RX ORDER — IBUPROFEN 800 MG/1
800 TABLET ORAL EVERY 8 HOURS
Status: DISCONTINUED | OUTPATIENT
Start: 2021-04-02 | End: 2021-04-02 | Stop reason: HOSPADM

## 2021-04-02 RX ADMIN — OXYTOCIN-SODIUM CHLORIDE 0.9% IV SOLN 30 UNIT/500ML 650 ML/HR: 30-0.9/5 SOLUTION at 20:51

## 2021-04-02 RX ADMIN — BUPIVACAINE HYDROCHLORIDE 8 ML: 2.5 INJECTION, SOLUTION EPIDURAL; INFILTRATION; INTRACAUDAL; PERINEURAL at 18:23

## 2021-04-02 RX ADMIN — LIDOCAINE HYDROCHLORIDE AND EPINEPHRINE 2 ML: 15; 5 INJECTION, SOLUTION EPIDURAL at 18:18

## 2021-04-02 RX ADMIN — Medication 10 ML/HR: at 18:27

## 2021-04-02 RX ADMIN — SODIUM CHLORIDE, POTASSIUM CHLORIDE, SODIUM LACTATE AND CALCIUM CHLORIDE 125 ML/HR: 600; 310; 30; 20 INJECTION, SOLUTION INTRAVENOUS at 16:25

## 2021-04-02 RX ADMIN — SODIUM CHLORIDE, POTASSIUM CHLORIDE, SODIUM LACTATE AND CALCIUM CHLORIDE 125 ML/HR: 600; 310; 30; 20 INJECTION, SOLUTION INTRAVENOUS at 18:00

## 2021-04-02 RX ADMIN — FENTANYL CITRATE 100 MCG: 50 INJECTION INTRAMUSCULAR; INTRAVENOUS at 18:23

## 2021-04-02 NOTE — PROGRESS NOTES
CC: Prenatal visit     Sebastian Alas is a 30 y.o.  at 38w1d.  Doing well.  Denies contractions, LOF, or VB.  Reports good FM.    /70   Wt 67.9 kg (149 lb 9.6 oz)   LMP 2020 (Exact Date)   BMI 27.36 kg/m²   SVE: 3 cm  Fundal Height (cm): 38 cm  Fetal Heart Rate: 150     Problems (from 20 to present)     Problem Noted Resolved    Noncompliance 2/3/2021 by Harish Johnson MD No    Priority:  High      History of abnormal cervical Pap smear 2020 by Harish Johnson MD No    Priority:  High      Positive urine drug screen 2020 by Harish Johnson MD No    Priority:  High      High risk pregnancy due to smoking in first trimester 2020 by Harish Johnson MD No    Priority:  High      History of LEEP (loop electrosurgical excision procedure) of cervix complicating pregnancy 2020 by Harish Johnson MD No    Priority:  High      History of  delivery 2018 by Harish Johnson MD No    Priority:  High            A/P: Sebastian Alas is a 30 y.o.  at 38w1d.  - RTC in 1 weeks  - Reviewed COVID-19 visitation policy  - Reviewed COVID-19 precautions     Diagnosis Plan   1. 38 weeks gestation of pregnancy     2. Noncompliance   portance of regular compliance particularly toward into pregnancy reviewed   3. History of abnormal cervical Pap smear     4. High risk pregnancy due to smoking in first trimester     5. History of loop electrosurgical excision procedure (LEEP) of cervix affecting pregnancy in third trimester     6. Positive urine drug screen   portance of avoiding drug use in pregnancy reviewed   7. History of  delivery   she refused a 17 hydroxyprogesterone toward and.   Ultrasound today shows ZO 17 biophysical profile   Harish Johnson MD  2021  13:59 CDT

## 2021-04-02 NOTE — ANESTHESIA PREPROCEDURE EVALUATION
Anesthesia Evaluation     Patient summary reviewed and Nursing notes reviewed                Airway   No difficulty expected  Dental      Pulmonary - normal exam   (+) asthma,recent URI,   Cardiovascular - normal exam        Neuro/Psych  (+) psychiatric history,     GI/Hepatic/Renal/Endo      Musculoskeletal     (+) neck pain,   Abdominal  - normal exam   Substance History      OB/GYN    (+) Pregnant,         Other                        Anesthesia Plan    ASA 2 - emergent     epidural     intravenous induction     Anesthetic plan, all risks, benefits, and alternatives have been provided, discussed and informed consent has been obtained with: patient.    Plan discussed with CRNA.

## 2021-04-02 NOTE — H&P
HISTORY & PHYSICAL - Obstetrics  [facility]    Name: Sebastian Alas  MRN: [id number]  Location: [facility]21  Date: 2021  CSN: [id number]      CHIEF COMPLAINT:  Contractions    HISTORY OF PRESENT ILLNESS  Sebastian Alas is a 30 y.o.  at 38w1d presents with a chief complaint of contractions that have worsened over the past several hours.  Denies LOF, vaginal bleeding.  Reports good FM.    Patient denies any chest pain, palpitations, headaches, lightheadedness, shortness of breath, cough, nausea, vomiting, diarrhea, constipation, fever, or chills.    ROS  Review of Systems   Constitutional: Negative.    HENT: Negative.    Eyes: Negative.    Respiratory: Negative.    Cardiovascular: Negative.    Gastrointestinal: Positive for abdominal pain.   Endocrine: Negative.    Genitourinary: Negative.    Musculoskeletal: Negative.    Skin: Negative.    Neurological: Negative.    Hematological: Negative.    Psychiatric/Behavioral: Negative.      PRENATAL LAB RESULTS  Prenatal labs reviewed  External Prenatal Results     Pregnancy Outside Results - Transcribed From Office Records - See Scanned Records For Details     Test Value Date Time    Hgb  12.1 g/dL 21 1004       13.0 g/dL 10/30/20 1656       13.0 g/dL 10/28/20 2146       14.8 g/dL 20 1202    Hct  35.4 % 21 1004       38.0 % 10/30/20 1656       39.5 % 10/28/20 2146       42.7 % 20 1202    ABO  A  20 1202    Rh  Positive  20 1202    Antibody Screen  Negative  20 1202    Glucose Fasting GTT       Glucose Tolerance Test 1 hour       Glucose Tolerance Test 3 hour       Gonorrhea (discrete)  Negative  21 1439       Negative  10/28/20 2150       Not Detected  20 1159    Chlamydia (discrete)  Negative  21 1439       Positive  10/28/20 2150       Not Detected  20 1159    RPR  Non-Reactive  20 1202    VDRL       Syphilis Antibody       Rubella  Negative  20 1202     HBsAg  Non-Reactive  08/31/20 1202    Herpes Simplex Virus PCR       Herpes Simplex VIrus Culture       HIV  Non-Reactive  08/31/20 1202    Hep C RNA Quant PCR ^ NONREACTIVE  01/29/18 1858    Hep C Antibody  Non-Reactive  08/31/20 1202    AFP       Group B Strep  Negative  03/11/21 1011    GBS Susceptibility to Clindamycin       GBS Susceptibility to Erythromycin       Fetal Fibronectin  Positive  01/14/19 1537    Genetic Testing, Maternal Blood             Drug Screening     Test Value Date Time    Urine Drug Screen       Amphetamine Screen  Negative  02/03/21 1425       Negative  08/31/20 1159    Barbiturate Screen  Negative  02/03/21 1425       Negative  08/31/20 1159    Benzodiazepine Screen  Negative  02/03/21 1425       Negative  08/31/20 1159    Methadone Screen  Negative  02/03/21 1425       Negative  08/31/20 1159    Phencyclidine Screen  Negative  02/03/21 1425       Negative  08/31/20 1159    Opiates Screen  Negative  02/03/21 1425       Negative  08/31/20 1159    THC Screen  Negative  02/03/21 1425       Negative  08/31/20 1159    Cocaine Screen       Propoxyphene Screen  Negative  02/03/21 1425       Negative  08/31/20 1159    Buprenorphine Screen  Negative  02/03/21 1425       Negative  08/31/20 1159    Methamphetamine Screen       Oxycodone Screen  Negative  02/03/21 1425       Negative  08/31/20 1159    Tricyclic Antidepressants Screen  Negative  02/03/21 1425       Negative  08/31/20 1159          Legend    ^: Historical                      PRENATAL RISK FACTORS  8/20 Problems (from 08/31/20 to present)     Problem Noted Resolved    Noncompliance 2/3/2021 by Harish Johnson MD No    History of abnormal cervical Pap smear 9/1/2020 by Harish Johnson MD No    Positive urine drug screen 9/1/2020 by Harish Johnson MD No    High risk pregnancy due to smoking in first trimester 9/1/2020 by Harish Johnson MD No    History of LEEP (loop electrosurgical excision procedure) of cervix complicating  pregnancy 2020 by Harish Johnson MD No    History of  delivery 2018 by Harish Johnson MD No        OB HISTORY  OB History    Para Term  AB Living   5 2 1 1 2 2   SAB TAB Ectopic Molar Multiple Live Births   2       0 2      # Outcome Date GA Lbr Vern/2nd Weight Sex Delivery Anes PTL Lv   5 Current            4 Term 19 38w0d / 00:07 2670 g (5 lb 14.2 oz) M  EPI N ISAIAH   3 2018     SAB      2   34w0d  2637 g (5 lb 13 oz) F Vag-Spont EPI Y ISAIAH   1 2009     SAB        GYN HISTORY  H/o chlamydia, trichomonas  S/p LEEP    PAST MEDICAL HISTORY  Past Medical History:   Diagnosis Date   • Abnormal Pap smear of cervix    • Anxiety    • Asthma    • Cervical dysplasia    • Chlamydia    • Cyst of ovary    • Palpitations    • Tobacco dependence syndrome    • URI (upper respiratory infection)    • Urogenital trichomoniasis    • Varicella      PAST SURGICAL HISTORY  Past Surgical History:   Procedure Laterality Date   • CERVICAL BIOPSY  W/ LOOP ELECTRODE EXCISION     • ENDOSCOPY  2012    Normal esophagus. Gastritis in stomach. Biopsy taken. Normal duodenum. Biopsy taken.   • ENDOSCOPY AND COLONOSCOPY  2012    Colitis in rectum. Biopsy taken.   • MULTIPLE TOOTH EXTRACTIONS       FAMILY HISTORY  Family History   Problem Relation Age of Onset   • Colon cancer Father    • Coronary artery disease Father    • Hypertension Father    • Kidney disease Father    • Thyroid disease Father    • Thyroid disease Sister    • Coronary artery disease Paternal Grandfather    • Coronary artery disease Maternal Grandmother    • COPD Maternal Grandmother    • Coronary artery disease Maternal Grandfather    • Heart disease Maternal Grandfather    • Heart attack Maternal Grandfather    • No Known Problems Mother    • Heart defect Son    • No Known Problems Daughter    • Dementia Paternal Grandmother      SOCIAL HISTORY  Social History     Socioeconomic History   • Marital status:  "     Spouse name: Not on file   • Number of children: Not on file   • Years of education: Not on file   • Highest education level: 9th grade   Tobacco Use   • Smoking status: Current Every Day Smoker     Packs/day: 0.50     Types: Cigarettes   • Smokeless tobacco: Never Used   • Tobacco comment: patient refuses   Vaping Use   • Vaping Use: Never used   Substance and Sexual Activity   • Alcohol use: Not Currently   • Drug use: No   • Sexual activity: Yes     Partners: Male     Comment: last pap at [facility]      ALLERGIES  No Known Allergies  HOME MEDICATIONS  Prior to Admission medications    Medication Sig Start Date End Date Taking? Authorizing Provider   hydrocortisone (ANUSOL-HC) 25 MG suppository Insert 1 suppository into the rectum 2 (Two) Times a Day. 3/18/21  Yes Harish Johnson MD   progesterone (PROMETRIUM) 100 MG capsule Insert 1 capsule into the vagina Every Night. 20  Provider, MD Arturo     PHYSICAL EXAM  Ht 157.5 cm (62\")   Wt 67.6 kg (149 lb)   LMP 2020 (Exact Date)   BMI 27.25 kg/m²   General: No acute distress. Well developed, well nourished. Pleasant.  Heart: Regular rate and rhythm. No murmurs, rubs, or gallops  Lungs: Clear to auscultation bilaterally. No wheezes, rales, or rhonchi.  Abdomen: Soft, nontender to palpation, enlarged by gravid uterus.    SVE per RN: 5-6/ 90/ -2, BBOW, vertex    IMPRESSION  Sebastian Alas is a 30 y.o.  at 38w1d admitted with term labor.    PLAN  1.  IUP at 38w1d with term labor  - Admit: Labor and Delivery  - Attending: Dr. Hand  - Condition: Stable  - Vitals: per protocol  - Activity: ad antoine  - Nursing: Continuous electronic fetal monitoring, as per protocol  - Diet: Clears  - IV fluids:  mL/hr  - Meds: None  - Allergies: NKDA  - Labs: CBC, T&S, UDS  - GBS: neg.  Antibiotics:  N/A  - Sebastian Alas and I have discussed pain goals for this hospitalization after reviewing her current clinical condition, " medical history and prior pain experiences.  The goal is to keep her pain level appropriate.  Patient does desire an epidural  - Anticipate     This document has been electronically signed by Poonam Hand MD on 2021 15:57 CDT.

## 2021-04-02 NOTE — ANESTHESIA PROCEDURE NOTES
Labor Epidural      Patient reassessed immediately prior to procedure    Patient location during procedure: OB  Start Time: 4/2/2021 6:07 PM  Stop Time: 4/2/2021 6:28 PM  Performed By  CRNA: Johan Wolfe CRNA  Preanesthetic Checklist  Completed: patient identified, IV checked, site marked, risks and benefits discussed, surgical consent, monitors and equipment checked, pre-op evaluation and timeout performed  Prep:  Pt Position:sitting  Sterile Tech:cap, gloves, mask and sterile barrier  Prep:povidone-iodine 7.5% surgical scrub  Monitoring:blood pressure monitoring and continuous pulse oximetry  Epidural Block Procedure:  Approach:midline  Guidance:landmark technique and palpation technique  Location:L3-L4  Needle Type:Tuohy  Needle Gauge:17 G  Loss of Resistance Medium: saline  Paresthesia: none  Aspiration:negative  Test Dose:negative  Med administered at 4/2/2021 6:44 PM  Number of Attempts: 1  Post Assessment:  Dressing:secured with tape and occlusive dressing applied  Pt Tolerance:patient tolerated the procedure well with no apparent complications  Complications:no

## 2021-04-03 VITALS
RESPIRATION RATE: 18 BRPM | HEART RATE: 86 BPM | OXYGEN SATURATION: 98 % | DIASTOLIC BLOOD PRESSURE: 63 MMHG | WEIGHT: 149 LBS | BODY MASS INDEX: 27.42 KG/M2 | HEIGHT: 62 IN | SYSTOLIC BLOOD PRESSURE: 124 MMHG | TEMPERATURE: 97.8 F

## 2021-04-03 PROCEDURE — 0503F POSTPARTUM CARE VISIT: CPT | Performed by: OBSTETRICS & GYNECOLOGY

## 2021-04-03 RX ORDER — OXYTOCIN/0.9 % SODIUM CHLORIDE 30/500 ML
650 PLASTIC BAG, INJECTION (ML) INTRAVENOUS ONCE
Status: DISCONTINUED | OUTPATIENT
Start: 2021-04-03 | End: 2021-04-04 | Stop reason: HOSPADM

## 2021-04-03 RX ORDER — DOCUSATE SODIUM 100 MG/1
100 CAPSULE, LIQUID FILLED ORAL 2 TIMES DAILY
Status: DISCONTINUED | OUTPATIENT
Start: 2021-04-03 | End: 2021-04-04 | Stop reason: HOSPADM

## 2021-04-03 RX ORDER — BISACODYL 10 MG
10 SUPPOSITORY, RECTAL RECTAL DAILY PRN
Status: DISCONTINUED | OUTPATIENT
Start: 2021-04-03 | End: 2021-04-04 | Stop reason: HOSPADM

## 2021-04-03 RX ORDER — ONDANSETRON 4 MG/1
4 TABLET, FILM COATED ORAL EVERY 6 HOURS PRN
Status: DISCONTINUED | OUTPATIENT
Start: 2021-04-03 | End: 2021-04-04 | Stop reason: HOSPADM

## 2021-04-03 RX ORDER — IBUPROFEN 800 MG/1
800 TABLET ORAL EVERY 8 HOURS
Status: DISCONTINUED | OUTPATIENT
Start: 2021-04-03 | End: 2021-04-04 | Stop reason: HOSPADM

## 2021-04-03 RX ORDER — ACETAMINOPHEN 500 MG
1000 TABLET ORAL EVERY 6 HOURS
Qty: 60 TABLET | Refills: 0 | Status: SHIPPED | OUTPATIENT
Start: 2021-04-03 | End: 2021-05-19

## 2021-04-03 RX ORDER — SODIUM CHLORIDE 0.9 % (FLUSH) 0.9 %
1-10 SYRINGE (ML) INJECTION AS NEEDED
Status: DISCONTINUED | OUTPATIENT
Start: 2021-04-03 | End: 2021-04-04 | Stop reason: HOSPADM

## 2021-04-03 RX ORDER — HYDROCORTISONE 25 MG/G
1 CREAM TOPICAL AS NEEDED
Status: DISCONTINUED | OUTPATIENT
Start: 2021-04-03 | End: 2021-04-04 | Stop reason: HOSPADM

## 2021-04-03 RX ORDER — LANOLIN 100 %
OINTMENT (GRAM) TOPICAL
Status: DISCONTINUED | OUTPATIENT
Start: 2021-04-03 | End: 2021-04-04 | Stop reason: HOSPADM

## 2021-04-03 RX ORDER — ACETAMINOPHEN 500 MG
1000 TABLET ORAL EVERY 6 HOURS
Status: DISCONTINUED | OUTPATIENT
Start: 2021-04-03 | End: 2021-04-04 | Stop reason: HOSPADM

## 2021-04-03 RX ORDER — PRENATAL VIT/IRON FUM/FOLIC AC 27MG-0.8MG
1 TABLET ORAL DAILY
Status: DISCONTINUED | OUTPATIENT
Start: 2021-04-03 | End: 2021-04-04 | Stop reason: HOSPADM

## 2021-04-03 RX ORDER — OXYTOCIN/0.9 % SODIUM CHLORIDE 30/500 ML
85 PLASTIC BAG, INJECTION (ML) INTRAVENOUS ONCE
Status: DISCONTINUED | OUTPATIENT
Start: 2021-04-03 | End: 2021-04-04 | Stop reason: HOSPADM

## 2021-04-03 RX ORDER — CALCIUM CARBONATE 200(500)MG
2 TABLET,CHEWABLE ORAL 3 TIMES DAILY PRN
Status: DISCONTINUED | OUTPATIENT
Start: 2021-04-03 | End: 2021-04-04 | Stop reason: HOSPADM

## 2021-04-03 RX ORDER — FERROUS SULFATE TAB EC 324 MG (65 MG FE EQUIVALENT) 324 (65 FE) MG
324 TABLET DELAYED RESPONSE ORAL 2 TIMES DAILY WITH MEALS
Status: DISCONTINUED | OUTPATIENT
Start: 2021-04-03 | End: 2021-04-04 | Stop reason: HOSPADM

## 2021-04-03 RX ORDER — IBUPROFEN 800 MG/1
800 TABLET ORAL EVERY 8 HOURS
Qty: 60 TABLET | Refills: 0 | Status: SHIPPED | OUTPATIENT
Start: 2021-04-03 | End: 2021-05-19

## 2021-04-03 RX ORDER — ONDANSETRON 2 MG/ML
4 INJECTION INTRAMUSCULAR; INTRAVENOUS EVERY 6 HOURS PRN
Status: DISCONTINUED | OUTPATIENT
Start: 2021-04-03 | End: 2021-04-04 | Stop reason: HOSPADM

## 2021-04-03 RX ADMIN — ACETAMINOPHEN 1000 MG: 500 TABLET, FILM COATED ORAL at 10:38

## 2021-04-03 RX ADMIN — ACETAMINOPHEN 1000 MG: 500 TABLET, FILM COATED ORAL at 16:38

## 2021-04-03 RX ADMIN — ACETAMINOPHEN 1000 MG: 500 TABLET, FILM COATED ORAL at 03:39

## 2021-04-03 RX ADMIN — IBUPROFEN 800 MG: 800 TABLET, FILM COATED ORAL at 08:55

## 2021-04-03 RX ADMIN — PRENATAL VIT W/ FE FUMARATE-FA TAB 27-0.8 MG 1 TABLET: 27-0.8 TAB at 08:55

## 2021-04-03 RX ADMIN — IBUPROFEN 800 MG: 800 TABLET, FILM COATED ORAL at 16:38

## 2021-04-03 RX ADMIN — DOCUSATE SODIUM 100 MG: 100 CAPSULE, LIQUID FILLED ORAL at 08:55

## 2021-04-03 RX ADMIN — FERROUS SULFATE TAB EC 324 MG (65 MG FE EQUIVALENT) 324 MG: 324 (65 FE) TABLET DELAYED RESPONSE at 08:55

## 2021-04-03 NOTE — DISCHARGE SUMMARY
[facility]  Discharge Summary  Patient Name: Sebastian Alas  : 1990  MRN: [id number]  CSN: [id number]    Discharge Summary    Date of Admission: 2021   Date of Discharge: 4/3/2021    Principle Discharge Dx: Active Hospital Problems    Diagnosis  POA   • **Normal labor [O80, Z37.9]  Not Applicable   • Single liveborn infant delivered vaginally [Z38.00]  No   • Polyhydramnios in third trimester [O40.3XX0]  Yes   • Noncompliance [Z91.19]  Not Applicable   • Tobacco smoking affecting pregnancy in third trimester [O99.333]  Yes   • History of LEEP (loop electrosurgical excision procedure) of cervix complicating pregnancy [O34.40, Z98.890]  Not Applicable   • Positive urine drug screen [R82.5]  Yes   • History of abnormal cervical Pap smear [Z87.42]  Not Applicable   • History of  delivery [Z87.51]  Not Applicable      Procedures Performed:    Brief History: Patient is a 30 y.o. now  who presented to labor and delivery at 38w1d in term labor.   Hospital Course: Patient presented at 38w1d in term labor.  She had a .  Her postpartum course was unremarkable.  On PPD #1 she expressed the desire for discharge.  She had passed gas and was urinating normally.  She was eating a regular diet without difficulty.  She was ambulating well.  Discharge instructions were given.  All questions were answered   Condition:  Discharge Activity:  Discharge Diet: Stable  Activity Instructions     Bathing Restrictions      Type of Restriction: Bathing    Bathing Restrictions: Other    Explain Bathing Restrictions: No soaking in bathtub for 4 weeks. Showers are fine.    Driving Restrictions      Type of Restriction: Driving    Driving Restrictions: No Driving (Time Limited)    Length: Other    Indicate Length of Restriction: No driving for 1 week or if using narcotic pain medications. Riding is car is fine.    Lifting Restrictions      Type of Restriction: Lifting    Lifting  Restrictions: Other    Explain Lifing Restrictions: No lifting more than infant and baby carrier together for 6 weeks.    Pelvic Rest      Nothing in the vagina for 6 weeks to include tampons, intercourse, or douching.    Sexual Activity Restrictions      Type of Restriction: Sex    Explain Sexual Activity Restrictions: No sexual intercourse for at least 6 weeks        Regular   Discharge Medications:    Your medication list      START taking these medications      Instructions Last Dose Given Next Dose Due   acetaminophen 500 MG tablet  Commonly known as: TYLENOL      Take 2 tablets by mouth Every 6 (Six) Hours.       ibuprofen 800 MG tablet  Commonly known as: ADVIL,MOTRIN      Take 1 tablet by mouth Every 8 (Eight) Hours.          CONTINUE taking these medications      Instructions Last Dose Given Next Dose Due   hydrocortisone 25 MG suppository  Commonly known as: ANUSOL-HC      Insert 1 suppository into the rectum 2 (Two) Times a Day.             Where to Get Your Medications      These medications were sent to [id number] - [address] AT [facility] [phone] [facility] [phone] [address] [address]    Phone: [phone] ·   acetaminophen 500 MG tablet  · ibuprofen 800 MG tablet        Discharge Disposition: Home   Follow-up: No future appointments.  2 week PP visit (telephone)  6 week PP visit     <30 minutes was spent with the patient on the day of discharge.    This document has been electronically signed by Poonam Hand MD on April 3, 2021 11:04 CDT.

## 2021-04-03 NOTE — CONSULTS
Case Management/Social Work    Patient Name:  Sebastian Alas  YOB: 1990  MRN: [id number]  Admit Date:  4/2/2021      SW received consult re: mother having hx of +UDS during pregnancy. Mother positive for Methamphetamines on 8/31/2020. Mother UDS normal upon admission. Baby Nguyen Alas UDS also normal, meconium pending. SW met with mother in room with her sister present. She voiced permission for her sister to stay during conversation. Mother admits to methamphetamine use during beginning of pregnancy but soon quit using after finding out she was expecting. She did state that this is her third living child and that her sister had temporary custody of her daughter a few years ago but no active DCBS case at this time. SW explained referral to DCBS if meconium showed anything not prx during pregnancy. She voiced understanding. No concerns noted or voiced by nursing staff at this time. Mother reports of a good support system inluding her mother and sister. She is currently enrolled in [employer] and [employer] and has already reached out to her mentor with [employer]. SW noted several no-shows and non-compliance in chart. Mother states she does not have transportation issues or needs/ concerns for baby Nguyen at this time. SW confirmed mother's address and telephone number.  staff will continue to follow meconium results and make appropriate referrals if needed.    Electronically signed by:  VIKRAM Aranda  04/03/21 10:58 CDT

## 2021-04-03 NOTE — L&D DELIVERY NOTE
[facility]  Vaginal Delivery Note    Delivery     Delivery: Vaginal, Spontaneous     YOB: 2019    Time of Birth:  Gestational Age 8:47 PM   38w1d     Anesthesia: Epidural     Delivering clinician: Poonam Hand    Forceps?   No   Vacuum? No    Shoulder dystocia present: No        Delivery narrative:     Patient pushed to deliver a viable 2540g male from the BRENDA position over an intact perineum via  under epidural anesthesia on 2021 at 8:47PM.  No noted nuchal cord.  Right anterior shoulder was delivered with ease, followed by left posterior shoulder.  Body was then delivered with gentle traction.  Mouth and nose bulb suctioned.  3 vessel cord clamped x2 and cut after 30 seconds of delayed clamping.  Baby was placed on mother's chest.  Cord blood was obtained and sent.  Placenta delivered spontaneously intact and Pitocin was started.  Cervix, vagina, and perineum were examined and no laceration was noted.  EBL 100mL; QBL pending, please see nursing documentation.  Apgar scores 9/9.  Mother and infant stable.    Infant    Findings: male  infant     Infant observations: Weight: 2540 g (5 lb 9.6 oz)   Length: 18.504  in  Observations/Comments:        Apgars: 9  @ 1 minute /    9  @ 5 minutes   Infant Name: Scottie Alas     Placenta, Cord, and Fluid    Placenta delivered  Spontaneous  at   2021  8:50 PM     Cord: 3 vessels  present.   Nuchal Cord?  no   Cord blood obtained: Yes    Cord gases obtained:  No    Cord gas results: Venous:  No results found for: PHCVEN    Arterial:  No results found for: PHCART     Repair    Episiotomy: None     No    Lacerations: No   Estimated Blood Loss: Est. Blood Loss (mL): 100 mL (Filed from Delivery Summary) (21)         Complications  none    Disposition  Mother to Mother Baby/Postpartum in stable condition currently.  Baby to NBN in stable condition currently.    Poonam Hand MD  21  21:02 CDT

## 2021-04-03 NOTE — PLAN OF CARE
Problem: Adult Inpatient Plan of Care  Goal: Plan of Care Review  Outcome: Ongoing, Progressing  Flowsheets (Taken 4/3/2021 5836)  Progress: improving  Plan of Care Reviewed With: patient  Outcome Summary: VSS, fundus frim, lochia light, ambulates in room, tolerating Po intake, pain controlled with PO pain medications.   Goal Outcome Evaluation:  Plan of Care Reviewed With: patient  Progress: improving  Outcome Summary: VSS, fundus frim, lochia light, ambulates in room, tolerating Po intake, pain controlled with PO pain medications.

## 2021-04-03 NOTE — ANESTHESIA POSTPROCEDURE EVALUATION
Patient: Sebastian Alas    Procedure Summary     Date: 04/02/21 Room / Location:     Anesthesia Start: 1805 Anesthesia Stop: 2300    Procedure: LABOR ANALGESIA Diagnosis:     Scheduled Providers:  Provider: Johan Wolfe CRNA    Anesthesia Type: epidural ASA Status: 2 - Emergent          Anesthesia Type: epidural    Vitals  Vitals Value Taken Time   /63 04/02/21 2255   Temp 98.7 °F (37.1 °C) 04/02/21 2056   Pulse 109 04/02/21 2255   Resp 18 04/02/21 2241   SpO2 97 % 04/02/21 2107   Vitals shown include unvalidated device data.        Post Anesthesia Care and Evaluation    Patient location during evaluation: PHASE II  Patient participation: complete - patient participated  Level of consciousness: awake and alert  Pain score: 0  Pain management: adequate  Airway patency: patent  Anesthetic complications: No anesthetic complications  PONV Status: none  Cardiovascular status: acceptable  Respiratory status: acceptable  Hydration status: acceptable  Post Neuraxial Block status: No signs or symptoms of PDPH

## 2021-04-03 NOTE — PROGRESS NOTES
"[facility]  Progress Note - Obstetrics    Name: Sebastian Alas  MRN: [id number]  Location: M758/1  Date: 4/3/2021  CSN: [id number]       PPD #1 s/p  at 38w1d with term labor     Patient seen and examined.  Denies headache, dizziness, chest pain, shortness of breath, nausea, vomiting.  Minimal/moderate lochia.  OOB and ambulating.  Tolerating regular diet.  Voiding without difficulty.  Breastfeeding.    PHYSICAL EXAM  /68 (BP Location: Left arm, Patient Position: Sitting)   Pulse 98   Temp 97.5 °F (36.4 °C) (Oral)   Resp 18   Ht 157.5 cm (62\")   Wt 67.6 kg (149 lb)   LMP 2020 (Exact Date)   SpO2 98%   Breastfeeding Yes   BMI 27.25 kg/m²   General: AAOx3, no apparent distress.  Lungs: CTA B/L anteriorly, no wheezes, rales, rhonchi.  CV: Acceptable rate, regular rhythm, S1/S2 normal.  Abdomen: +BS, soft, nondistended, uterine fundus firm, nontender, and below umbilicus.  Lower extremities: No bilateral edema.  2+/4+ dorsalis pedis pulses B/L.    IMPRESSION  Sebastian Alas is a 30 y.o.  PPD #1 s/p .  Doing well and recovering appropriately.    PLAN  1.  Postpartum s/p   - Continue routine postpartum care.  - Diet: Pregnancy/breastfeeding  - DVT prophylaxis: SCDs  - Breastfeeding  - Birth control options were discussed and patient decided on BTL; will sign MA consents prior to discharge  - Discharge to home today.  Follow-up in 2 weeks, 6 weeks with OB/GYN provider.     This document has been electronically signed by Poonam Hand MD on April 3, 2021 11:01 CDT.  "

## 2021-04-04 NOTE — PLAN OF CARE
Problem: Adult Inpatient Plan of Care  Goal: Plan of Care Review  Outcome: Ongoing, Progressing  Flowsheets  Taken 4/3/2021 2201 by Mariana Hinton, RN  Progress: improving  Outcome Summary: Pt ambulating in room well. Interacting with . VSS. Pt to be discharged.  Taken 4/3/2021 0534 by Jennifer Fitzgerald RN  Plan of Care Reviewed With: patient   Goal Outcome Evaluation:     Progress: improving  Outcome Summary: Pt ambulating in room well. Interacting with . VSS. Pt to be discharged.

## 2021-04-08 NOTE — PAYOR COMM NOTE
"Hilary Pulido   [facility]  phone [phone]  fax [phone]    Auth# [id number]    Sebastian Alas (30 y.o. Female)     Date of Birth Social Security Number Address Home Phone MRN    1990  [address] [facility] [phone] [id number]    Anglican Marital Status          Alevism        Admission Date Admission Type Admitting Provider Attending Provider Department, Room/Bed    21 Elective Poonam Hand MD  [facility] MOTHER BABY, M758/1    Discharge Date Discharge Disposition Discharge Destination        4/3/2021 Home or Self Care              Attending Provider: (none)   Allergies: No Known Allergies    Isolation: None   Infection: None   Code Status: Prior    Ht: 157.5 cm (62\")   Wt: 67.6 kg (149 lb)    Admission Cmt: None   Principal Problem: Normal labor [O80,Z37.9]                 Active Insurance as of 2021     Primary Coverage     Payor Plan Insurance Group Employer/Plan Group    [place] [id number]     Payor Plan Address Payor Plan Phone Number Payor Plan Fax Number Effective Dates    [employer] [address] [address] [phone]  2020 - None Entered    [facility] [id number]       Subscriber Name Subscriber Birth Date Member ID       SEBASTIAN ALAS 1990 [id number]                 Emergency Contacts      (Rel.) Home Phone Work Phone Mobile Phone    Alexandria Alas (Mother) [phone] -- [phone]               Discharge Summary      Poonam Hand MD at 21 1104          [facility]  Discharge Summary  Patient Name: Sebastian Alas  : 1990  MRN: [id number]  CSN: [id number]    Discharge Summary    Date of Admission: 2021   Date of Discharge: 4/3/2021    Principle Discharge Dx: Active Hospital Problems    Diagnosis  POA   • **Normal labor [O80, Z37.9]  Not Applicable   • Single liveborn infant " delivered vaginally [Z38.00]  No   • Polyhydramnios in third trimester [O40.3XX0]  Yes   • Noncompliance [Z91.19]  Not Applicable   • Tobacco smoking affecting pregnancy in third trimester [O99.333]  Yes   • History of LEEP (loop electrosurgical excision procedure) of cervix complicating pregnancy [O34.40, Z98.890]  Not Applicable   • Positive urine drug screen [R82.5]  Yes   • History of abnormal cervical Pap smear [Z87.42]  Not Applicable   • History of  delivery [Z87.51]  Not Applicable      Procedures Performed:    Brief History: Patient is a 30 y.o. now  who presented to labor and delivery at 38w1d in term labor.   Hospital Course: Patient presented at 38w1d in term labor.  She had a .  Her postpartum course was unremarkable.  On PPD #1 she expressed the desire for discharge.  She had passed gas and was urinating normally.  She was eating a regular diet without difficulty.  She was ambulating well.  Discharge instructions were given.  All questions were answered   Condition:  Discharge Activity:  Discharge Diet: Stable  Activity Instructions     Bathing Restrictions      Type of Restriction: Bathing    Bathing Restrictions: Other    Explain Bathing Restrictions: No soaking in bathtub for 4 weeks. Showers are fine.    Driving Restrictions      Type of Restriction: Driving    Driving Restrictions: No Driving (Time Limited)    Length: Other    Indicate Length of Restriction: No driving for 1 week or if using narcotic pain medications. Riding is car is fine.    Lifting Restrictions      Type of Restriction: Lifting    Lifting Restrictions: Other    Explain Lifing Restrictions: No lifting more than infant and baby carrier together for 6 weeks.    Pelvic Rest      Nothing in the vagina for 6 weeks to include tampons, intercourse, or douching.    Sexual Activity Restrictions      Type of Restriction: Sex    Explain Sexual Activity Restrictions: No sexual intercourse for at least 6 weeks         Regular   Discharge Medications:    Your medication list      START taking these medications      Instructions Last Dose Given Next Dose Due   acetaminophen 500 MG tablet  Commonly known as: TYLENOL      Take 2 tablets by mouth Every 6 (Six) Hours.       ibuprofen 800 MG tablet  Commonly known as: ADVIL,MOTRIN      Take 1 tablet by mouth Every 8 (Eight) Hours.          CONTINUE taking these medications      Instructions Last Dose Given Next Dose Due   hydrocortisone 25 MG suppository  Commonly known as: ANUSOL-HC      Insert 1 suppository into the rectum 2 (Two) Times a Day.             Where to Get Your Medications      These medications were sent to [facility][id number] - [address] AT [facility] - [phone]  - [phone] [address] [address]    Phone: [phone] ·   acetaminophen 500 MG tablet  · ibuprofen 800 MG tablet        Discharge Disposition: Home   Follow-up: No future appointments.  2 week PP visit (telephone)  6 week PP visit     <30 minutes was spent with the patient on the day of discharge.    This document has been electronically signed by Poonam Hand MD on April 3, 2021 11:04 CDT.    Electronically signed by Poonam Hand MD at 04/03/21 8200

## 2021-04-21 ENCOUNTER — POSTPARTUM VISIT (OUTPATIENT)
Dept: OBSTETRICS AND GYNECOLOGY | Facility: CLINIC | Age: 31
End: 2021-04-21

## 2021-04-21 VITALS
SYSTOLIC BLOOD PRESSURE: 98 MMHG | DIASTOLIC BLOOD PRESSURE: 68 MMHG | WEIGHT: 137.8 LBS | BODY MASS INDEX: 25.36 KG/M2 | HEIGHT: 62 IN

## 2021-04-21 DIAGNOSIS — Z30.2 STERILIZATION: ICD-10-CM

## 2021-04-21 PROCEDURE — 99213 OFFICE O/P EST LOW 20 MIN: CPT | Performed by: OBSTETRICS & GYNECOLOGY

## 2021-04-23 PROBLEM — Z30.2 STERILIZATION: Status: ACTIVE | Noted: 2021-04-23

## 2021-04-23 NOTE — PROGRESS NOTES
Sebastian Alas is a 30 y.o. y/o female.     Chief Complaint: Postpartum follow-up; desires sterilization    HPI:   30 y.o. .  Patient's last menstrual period was 2020 (exact date)..  Patient is postpartum Letha 2 she is interested in sterilization I went over the risk benefits alternatives at length were tentatively going to schedule for 2021 for bilateral salpingectomy risk benefits alternatives reviewed          Review of Systems     Constitutional: Denies night sweats    HENT: No hearing changes, denies ear pain    Eye: No eye pain; no foreign body in eye    Pulmonary: No hemoptysis    Cardiovascular: No claudication    GI: No hematemesis    Musculoskeletal: No arthralgias, no joint swelling    Endocrine: No polydipsia or polyuria    Hematologic: Denies any free bleeding    Psychiatric: Denies any delusions    The following portions of the patient's history were reviewed and updated as appropriate: allergies, current medications, past family history, past medical history, past social history, past surgical history and problem list.    No Known Allergies     Outpatient Medications Prior to Visit   Medication Sig Dispense Refill   • acetaminophen (TYLENOL) 500 MG tablet Take 2 tablets by mouth Every 6 (Six) Hours. 60 tablet 0   • ibuprofen (ADVIL,MOTRIN) 800 MG tablet Take 1 tablet by mouth Every 8 (Eight) Hours. 60 tablet 0   • hydrocortisone (ANUSOL-HC) 25 MG suppository Insert 1 suppository into the rectum 2 (Two) Times a Day. 12 each 0     No facility-administered medications prior to visit.        The patient has a family history of   Family History   Problem Relation Age of Onset   • Colon cancer Father    • Coronary artery disease Father    • Hypertension Father    • Kidney disease Father    • Thyroid disease Father    • Thyroid disease Sister    • Coronary artery disease Paternal Grandfather    • Coronary artery disease Maternal Grandmother    • COPD Maternal Grandmother    •  Coronary artery disease Maternal Grandfather    • Heart disease Maternal Grandfather    • Heart attack Maternal Grandfather    • No Known Problems Mother    • Heart defect Son    • No Known Problems Daughter    • Dementia Paternal Grandmother         Past Medical History:   Diagnosis Date   • Abnormal Pap smear of cervix    • Anxiety    • Asthma    • Cervical dysplasia    • Chlamydia    • Cyst of ovary    • Palpitations    • Tobacco dependence syndrome    • URI (upper respiratory infection)    • Urogenital trichomoniasis    • Varicella         OB History        5    Para   3    Term   2       1    AB   2    Living   3       SAB   2    TAB        Ectopic        Molar        Multiple   0    Live Births   3                 Social History     Socioeconomic History   • Marital status:      Spouse name: Not on file   • Number of children: Not on file   • Years of education: Not on file   • Highest education level: 9th grade   Tobacco Use   • Smoking status: Current Every Day Smoker     Packs/day: 0.50     Types: Cigarettes   • Smokeless tobacco: Never Used   • Tobacco comment: patient refuses   Vaping Use   • Vaping Use: Never used   Substance and Sexual Activity   • Alcohol use: Not Currently   • Drug use: No   • Sexual activity: Yes     Partners: Male     Comment: last pap at [facility]         Past Surgical History:   Procedure Laterality Date   • CERVICAL BIOPSY  W/ LOOP ELECTRODE EXCISION     • ENDOSCOPY  2012    Normal esophagus. Gastritis in stomach. Biopsy taken. Normal duodenum. Biopsy taken.   • ENDOSCOPY AND COLONOSCOPY  2012    Colitis in rectum. Biopsy taken.   • MULTIPLE TOOTH EXTRACTIONS          Patient Active Problem List   Diagnosis   • History of  delivery   • History of abnormal cervical Pap smear   • Positive urine drug screen   • Tobacco smoking affecting pregnancy in third trimester   • History of LEEP (loop electrosurgical excision procedure) of cervix  "complicating pregnancy   • Noncompliance   • Polyhydramnios in third trimester   • Normal labor   • Single liveborn infant delivered vaginally   • Sterilization   • Postpartum state        Documented Vitals    04/21/21 0945   BP: 98/68   Weight: 62.5 kg (137 lb 12.8 oz)   Height: 157.5 cm (62\")        Body mass index is 25.2 kg/m².    Physical Exam  Constitutional: Appears to be in no acute distress; Eyes: Sclerae normal; Endocrine system: Thyroid palpation is normal; Pulmonary system: Lungs clear; Cardiovascular system: Heart regular rate and rhythm; Gastrointestinal system: Abdomen soft and nontender, active bowel sounds; Urologic system: CVA negative; Psychiatric: Appropriate insight; Neurologic: Gait within normal limits     Laboratory Data:   Lab Results - Last 18 Months   Lab Units 10/30/20  1656 10/28/20  2146   GLUCOSE mg/dL 83 89   BUN mg/dL 7 6   CREATININE mg/dL 0.53* 0.62   SODIUM mmol/L 135* 140   POTASSIUM mmol/L 3.5 3.4*   CHLORIDE mmol/L 102 103   CO2 mmol/L 24.0 26.0   CALCIUM mg/dL 9.0 9.8   TOTAL PROTEIN g/dL 6.2 6.7   ALBUMIN g/dL 3.90 4.10   ALT (SGPT) U/L 13 15   AST (SGOT) U/L 11 12   ALK PHOS U/L 73 77   BILIRUBIN mg/dL <0.2 0.2   EGFR IF NONAFRICN AM mL/min/1.73 138 115   GLOBULIN gm/dL 2.3 2.6   A/G RATIO g/dL 1.7 1.6   BUN / CREAT RATIO  13.2 9.7   ANION GAP mmol/L 9.0 11.0     Lab Results - Last 18 Months   Lab Units 04/02/21  1646 03/11/21  1004 10/30/20  1656 10/28/20  2146 08/31/20  1202   WBC 10*3/mm3 20.50* 19.23* 15.18* 15.68* 11.83*   RBC 10*6/mm3 4.27 4.15 4.24 4.33 4.93   HEMOGLOBIN g/dL 11.9* 12.1 13.0 13.0 14.8   HEMATOCRIT % 36.1 35.4 38.0 39.5 42.7   MCV fL 84.5 85.3 89.6 91.2 86.6   MCH pg 27.9 29.2 30.7 30.0 30.0   MCHC g/dL 33.0 34.2 34.2 32.9 34.7   RDW % 13.5 12.7 13.6 13.3 12.8   RDW-SD fl 41.4 38.8 44.6 45.1 39.8   MPV fL 10.1 10.4 10.0 10.0 10.9   PLATELETS 10*3/mm3 373 387 315 311 373     No results for input(s): HCGQUAL in the last 70003 hours.    Assessment        " Diagnosis Plan   1. Postpartum state     2. Sterilization   gradual for bilateral salpingectomy         Plan       No orders of the defined types were placed in this encounter.            This document has been electronically signed by Harish Johnson MD on April 23, 2021 15:23 CDT    Please note that portions of this note were completed with a voice recognition program.

## 2021-05-19 ENCOUNTER — PRE-ADMISSION TESTING (OUTPATIENT)
Dept: PREADMISSION TESTING | Facility: HOSPITAL | Age: 31
End: 2021-05-19

## 2021-05-19 ENCOUNTER — POSTPARTUM VISIT (OUTPATIENT)
Dept: OBSTETRICS AND GYNECOLOGY | Facility: CLINIC | Age: 31
End: 2021-05-19

## 2021-05-19 VITALS
BODY MASS INDEX: 24.84 KG/M2 | RESPIRATION RATE: 14 BRPM | DIASTOLIC BLOOD PRESSURE: 84 MMHG | SYSTOLIC BLOOD PRESSURE: 120 MMHG | HEIGHT: 62 IN | OXYGEN SATURATION: 98 % | WEIGHT: 135 LBS | HEART RATE: 79 BPM

## 2021-05-19 VITALS
DIASTOLIC BLOOD PRESSURE: 68 MMHG | SYSTOLIC BLOOD PRESSURE: 102 MMHG | BODY MASS INDEX: 24.88 KG/M2 | WEIGHT: 135.2 LBS | HEIGHT: 62 IN

## 2021-05-19 DIAGNOSIS — Z30.2 STERILIZATION: ICD-10-CM

## 2021-05-19 DIAGNOSIS — Z30.2 STERILIZATION: Primary | ICD-10-CM

## 2021-05-19 LAB
ANION GAP SERPL CALCULATED.3IONS-SCNC: 10 MMOL/L (ref 5–15)
BASOPHILS # BLD AUTO: 0.08 10*3/MM3 (ref 0–0.2)
BASOPHILS NFR BLD AUTO: 0.7 % (ref 0–1.5)
BUN SERPL-MCNC: 9 MG/DL (ref 6–20)
BUN/CREAT SERPL: 12.7 (ref 7–25)
CALCIUM SPEC-SCNC: 10 MG/DL (ref 8.6–10.5)
CHLORIDE SERPL-SCNC: 103 MMOL/L (ref 98–107)
CO2 SERPL-SCNC: 26 MMOL/L (ref 22–29)
CREAT SERPL-MCNC: 0.71 MG/DL (ref 0.57–1)
DEPRECATED RDW RBC AUTO: 44.1 FL (ref 37–54)
EOSINOPHIL # BLD AUTO: 0.3 10*3/MM3 (ref 0–0.4)
EOSINOPHIL NFR BLD AUTO: 2.8 % (ref 0.3–6.2)
ERYTHROCYTE [DISTWIDTH] IN BLOOD BY AUTOMATED COUNT: 14.4 % (ref 12.3–15.4)
GFR SERPL CREATININE-BSD FRML MDRD: 98 ML/MIN/1.73
GLUCOSE SERPL-MCNC: 100 MG/DL (ref 65–99)
HCT VFR BLD AUTO: 44 % (ref 34–46.6)
HGB BLD-MCNC: 14.4 G/DL (ref 12–15.9)
IMM GRANULOCYTES # BLD AUTO: 0.03 10*3/MM3 (ref 0–0.05)
IMM GRANULOCYTES NFR BLD AUTO: 0.3 % (ref 0–0.5)
LYMPHOCYTES # BLD AUTO: 2.88 10*3/MM3 (ref 0.7–3.1)
LYMPHOCYTES NFR BLD AUTO: 26.9 % (ref 19.6–45.3)
MCH RBC QN AUTO: 27.5 PG (ref 26.6–33)
MCHC RBC AUTO-ENTMCNC: 32.7 G/DL (ref 31.5–35.7)
MCV RBC AUTO: 84.1 FL (ref 79–97)
MONOCYTES # BLD AUTO: 0.79 10*3/MM3 (ref 0.1–0.9)
MONOCYTES NFR BLD AUTO: 7.4 % (ref 5–12)
NEUTROPHILS NFR BLD AUTO: 6.64 10*3/MM3 (ref 1.7–7)
NEUTROPHILS NFR BLD AUTO: 61.9 % (ref 42.7–76)
NRBC BLD AUTO-RTO: 0 /100 WBC (ref 0–0.2)
PLATELET # BLD AUTO: 371 10*3/MM3 (ref 140–450)
PMV BLD AUTO: 10.1 FL (ref 6–12)
POTASSIUM SERPL-SCNC: 4.4 MMOL/L (ref 3.5–5.2)
RBC # BLD AUTO: 5.23 10*6/MM3 (ref 3.77–5.28)
SODIUM SERPL-SCNC: 139 MMOL/L (ref 136–145)
WBC # BLD AUTO: 10.72 10*3/MM3 (ref 3.4–10.8)

## 2021-05-19 PROCEDURE — 85025 COMPLETE CBC W/AUTO DIFF WBC: CPT

## 2021-05-19 PROCEDURE — 80048 BASIC METABOLIC PNL TOTAL CA: CPT

## 2021-05-19 PROCEDURE — 99214 OFFICE O/P EST MOD 30 MIN: CPT | Performed by: OBSTETRICS & GYNECOLOGY

## 2021-05-19 PROCEDURE — 36415 COLL VENOUS BLD VENIPUNCTURE: CPT

## 2021-05-19 RX ORDER — SODIUM CHLORIDE 0.9 % (FLUSH) 0.9 %
10 SYRINGE (ML) INJECTION AS NEEDED
Status: CANCELLED | OUTPATIENT
Start: 2021-06-01

## 2021-05-19 RX ORDER — SODIUM CHLORIDE, SODIUM LACTATE, POTASSIUM CHLORIDE, CALCIUM CHLORIDE 600; 310; 30; 20 MG/100ML; MG/100ML; MG/100ML; MG/100ML
125 INJECTION, SOLUTION INTRAVENOUS CONTINUOUS
Status: CANCELLED | OUTPATIENT
Start: 2021-06-01

## 2021-05-19 RX ORDER — BUPIVACAINE HCL/0.9 % NACL/PF 0.1 %
2 PLASTIC BAG, INJECTION (ML) EPIDURAL ONCE
Status: CANCELLED | OUTPATIENT
Start: 2021-06-01 | End: 2021-05-19

## 2021-05-19 RX ORDER — SODIUM CHLORIDE 0.9 % (FLUSH) 0.9 %
3 SYRINGE (ML) INJECTION EVERY 12 HOURS SCHEDULED
Status: CANCELLED | OUTPATIENT
Start: 2021-06-01

## 2021-05-20 NOTE — PROGRESS NOTES
Chief complaint I am sure I do not want having more children    Please see H&P dictated for hospital

## 2021-05-20 NOTE — H&P
History and Physical    Blondy Josefina Alas is a 30 y.o. y/o female.     Chief Complaint: I am sure I want to have both tubes out    HPI:   30 y.o. .  Patient's last menstrual period was 2020 (exact date)..  Patient is certain she wishes no further childbearing and wishes tubal sterilization.  The risk benefits alternatives have been reviewed at length and questions answered.Patient requests tubal sterilization by laparoscopy. She understands the short term risk of procedure to include death. She understands the risk of damage to bowel with risk of colostomy. She understands the risk of damage to the bowel not recognized at time of procedure with sepsis and/or need to return to OR. She understands the risk of damage to blood vessels with massive hemorrhage and transfusion and other complications. She understands the need for possible laparotomy for complications if unable to perform laparoscopically. She understands how her medical history and surgical history affects the likelihood of this happening.     She understands the long-term risk of sterilization including but not limited to: failure, intended to be permanent with possible regret, increased risk of menorrhagia and pelvic pain. That if there is a failure it is more likely it could be an ectopic pregnancy.    Discussed methods of sterilization and after reviewing the risk benefits alternatives wants to plan if technically possible for bilateral salpingectomy       Review of Systems     Constitutional: Denies night sweats    HENT: No hearing changes, denies ear pain    Eye: No eye pain; no foreign body in eye    Pulmonary: No hemoptysis    Cardiovascular: No claudication    GI: No hematemesis    Musculoskeletal: No arthralgias, no joint swelling    Endocrine: No polydipsia or polyuria    Hematologic: Denies any free bleeding    Psychiatric: Denies any delusions      The following portions of the patient's history were reviewed and updated as  appropriate: allergies, current medications, past family history, past medical history, past social history, past surgical history and problem list.    No Known Allergies     Prior to Admission medications    Not on File       The patient has a family history of   Family History   Problem Relation Age of Onset   • Colon cancer Father    • Coronary artery disease Father    • Hypertension Father    • Kidney disease Father    • Thyroid disease Father    • Thyroid disease Sister    • Coronary artery disease Paternal Grandfather    • Coronary artery disease Maternal Grandmother    • COPD Maternal Grandmother    • Coronary artery disease Maternal Grandfather    • Heart disease Maternal Grandfather    • Heart attack Maternal Grandfather    • No Known Problems Mother    • Heart defect Son    • No Known Problems Daughter    • Dementia Paternal Grandmother         Past Medical History:   Diagnosis Date   • Abnormal Pap smear of cervix    • Anxiety    • Anxiety and depression    • Asthma    • Bipolar 1 disorder (CMS/[facility])    • Cancer (CMS/[facility])     cervical cancer had leep procedure   • Cervical dysplasia    • Chlamydia    • Cyst of ovary    • Palpitations    • Tobacco dependence syndrome    • URI (upper respiratory infection)    • Urogenital trichomoniasis    • Varicella    • Wears dentures     uppers   • Wears glasses         OB History        5    Para   3    Term   2       1    AB   2    Living   3       SAB   2    TAB        Ectopic        Molar        Multiple   0    Live Births   3                 Social History     Socioeconomic History   • Marital status:      Spouse name: Not on file   • Number of children: Not on file   • Years of education: Not on file   • Highest education level: 9th grade   Tobacco Use   • Smoking status: Current Every Day Smoker     Packs/day: 0.50     Types: Cigarettes   • Smokeless tobacco: Never Used   • Tobacco comment: patient refuses   Vaping Use   • Vaping Use: Never  "used   Substance and Sexual Activity   • Alcohol use: Not Currently   • Drug use: Yes     Comment: recovering addict approsx 1 year   • Sexual activity: Yes     Partners: Male     Comment: last pap at [facility]         Past Surgical History:   Procedure Laterality Date   • CERVICAL BIOPSY  W/ LOOP ELECTRODE EXCISION     • ENDOSCOPY  2012    Normal esophagus. Gastritis in stomach. Biopsy taken. Normal duodenum. Biopsy taken.   • ENDOSCOPY AND COLONOSCOPY  2012    Colitis in rectum. Biopsy taken.   • MULTIPLE TOOTH EXTRACTIONS          Patient Active Problem List   Diagnosis   • History of  delivery   • History of abnormal cervical Pap smear   • Positive urine drug screen   • Tobacco smoking affecting pregnancy in third trimester   • History of LEEP (loop electrosurgical excision procedure) of cervix complicating pregnancy   • Noncompliance   • Polyhydramnios in third trimester   • Normal labor   • Single liveborn infant delivered vaginally   • Sterilization   • Postpartum state        Documented Vitals    21 1003   BP: 102/68   Weight: 61.3 kg (135 lb 3.2 oz)   Height: 157.5 cm (62\")        Body mass index is 24.73 kg/m².    Physical Exam  Constitutional: Appears to be in no acute distress; Eyes: Sclera normal; Endocrine system: Thyroid palpate is normal; Pulmonary system: Lungs clear; Cardiovascular system: Heart regular rate and rhythm; Gastrointestinal system: Abdomen soft, nontender, active bowel sounds; Urologic system: CVA negative; Psychiatric: Appropriate insight; Neurologic: Gait within normal limits.      Last Labs  Lab Results   Component Value Date    WBC 10.72 2021    RBC 5.23 2021    HGB 14.4 2021    HCT 44.0 2021    MCV 84.1 2021    MCH 27.5 2021    MCHC 32.7 2021    RDW 14.4 2021    RDWSD 44.1 2021    MPV 10.1 2021     2021        Lab Results   Component Value Date    GLUCOSE 100 (H) 2021    BUN 9 " 05/19/2021    CREATININE 0.71 05/19/2021     05/19/2021    K 4.4 05/19/2021     05/19/2021    CO2 26.0 05/19/2021    CALCIUM 10.0 05/19/2021    PROTEINTOT 6.2 10/30/2020    ALBUMIN 3.90 10/30/2020    ALT 13 10/30/2020    AST 11 10/30/2020    ALKPHOS 73 10/30/2020    BILITOT <0.2 10/30/2020    EGFRIFNONA 98 05/19/2021    GLOB 2.3 10/30/2020    AGRATIO 1.7 10/30/2020    BCR 12.7 05/19/2021    ANIONGAP 10.0 05/19/2021       No results found for: HCGQUAL    Assessment &Plan: Patient request sterilization after reviewing the risk benefits alternatives were gone plan for laparoscopic bilateral salpingectomy if technically possible possible laparotomy      Plan of care has been reviewed with staff, patient and family.  Risks, benefits of treatment plan have been discussed.  All questions have been answered.     Sebastian Alas and I have discussed pain goals for this hospitalization after reviewing her current clinical condition, medical history and prior pain experiences.  The goal is to keep her pain level 3.  To help achieve this, I plan to multimodal pain management.        This document has been electronically signed by Harish Johnson MD on May 20, 2021 18:38 CDT    Please note that portions of this note were completed with a voice recognition program.

## 2021-05-29 ENCOUNTER — LAB (OUTPATIENT)
Dept: LAB | Facility: HOSPITAL | Age: 31
End: 2021-05-29

## 2021-05-29 DIAGNOSIS — Z01.818 PREOP TESTING: Primary | ICD-10-CM

## 2021-05-29 LAB — SARS-COV-2 N GENE RESP QL NAA+PROBE: NOT DETECTED

## 2021-05-29 PROCEDURE — 87635 SARS-COV-2 COVID-19 AMP PRB: CPT

## 2021-05-29 PROCEDURE — C9803 HOPD COVID-19 SPEC COLLECT: HCPCS

## 2021-05-31 ENCOUNTER — ANESTHESIA EVENT (OUTPATIENT)
Dept: PERIOP | Facility: HOSPITAL | Age: 31
End: 2021-05-31

## 2021-06-01 ENCOUNTER — ANESTHESIA (OUTPATIENT)
Dept: PERIOP | Facility: HOSPITAL | Age: 31
End: 2021-06-01

## 2021-06-01 ENCOUNTER — HOSPITAL ENCOUNTER (OUTPATIENT)
Facility: HOSPITAL | Age: 31
Setting detail: HOSPITAL OUTPATIENT SURGERY
Discharge: HOME | End: 2021-06-01
Attending: OBSTETRICS & GYNECOLOGY | Admitting: OBSTETRICS & GYNECOLOGY

## 2021-06-01 VITALS
HEIGHT: 62 IN | OXYGEN SATURATION: 98 % | RESPIRATION RATE: 18 BRPM | WEIGHT: 132.28 LBS | HEART RATE: 58 BPM | BODY MASS INDEX: 24.34 KG/M2 | SYSTOLIC BLOOD PRESSURE: 123 MMHG | DIASTOLIC BLOOD PRESSURE: 62 MMHG | TEMPERATURE: 98.2 F

## 2021-06-01 DIAGNOSIS — Z30.2 STERILIZATION: ICD-10-CM

## 2021-06-01 DIAGNOSIS — Z90.79 H/O BILATERAL SALPINGECTOMY: Primary | ICD-10-CM

## 2021-06-01 LAB
ABO GROUP BLD: NORMAL
AMPHET+METHAMPHET UR QL: NEGATIVE
AMPHETAMINES UR QL: NEGATIVE
B-HCG UR QL: NEGATIVE
BARBITURATES UR QL SCN: NEGATIVE
BENZODIAZ UR QL SCN: NEGATIVE
BLD GP AB SCN SERPL QL: NEGATIVE
BUPRENORPHINE SERPL-MCNC: NEGATIVE NG/ML
CANNABINOIDS SERPL QL: NEGATIVE
COCAINE UR QL: NEGATIVE
Lab: NORMAL
METHADONE UR QL SCN: NEGATIVE
OPIATES UR QL: NEGATIVE
OXYCODONE UR QL SCN: NEGATIVE
PCP UR QL SCN: NEGATIVE
PROPOXYPH UR QL: NEGATIVE
RH BLD: POSITIVE
T&S EXPIRATION DATE: NORMAL
TRICYCLICS UR QL SCN: NEGATIVE

## 2021-06-01 PROCEDURE — 80306 DRUG TEST PRSMV INSTRMNT: CPT | Performed by: ANESTHESIOLOGY

## 2021-06-01 PROCEDURE — 86900 BLOOD TYPING SEROLOGIC ABO: CPT | Performed by: OBSTETRICS & GYNECOLOGY

## 2021-06-01 PROCEDURE — 86850 RBC ANTIBODY SCREEN: CPT | Performed by: OBSTETRICS & GYNECOLOGY

## 2021-06-01 PROCEDURE — 81025 URINE PREGNANCY TEST: CPT | Performed by: OBSTETRICS & GYNECOLOGY

## 2021-06-01 PROCEDURE — 25010000002 NEOSTIGMINE 10 MG/10ML SOLUTION: Performed by: NURSE ANESTHETIST, CERTIFIED REGISTERED

## 2021-06-01 PROCEDURE — 25010000002 ONDANSETRON PER 1 MG: Performed by: NURSE ANESTHETIST, CERTIFIED REGISTERED

## 2021-06-01 PROCEDURE — 25010000002 FENTANYL CITRATE (PF) 50 MCG/ML SOLUTION: Performed by: NURSE ANESTHETIST, CERTIFIED REGISTERED

## 2021-06-01 PROCEDURE — 25010000002 PROPOFOL 10 MG/ML EMULSION: Performed by: NURSE ANESTHETIST, CERTIFIED REGISTERED

## 2021-06-01 PROCEDURE — 58661 LAPAROSCOPY REMOVE ADNEXA: CPT | Performed by: OBSTETRICS & GYNECOLOGY

## 2021-06-01 PROCEDURE — 25010000002 CEFAZOLIN PER 500 MG: Performed by: OBSTETRICS & GYNECOLOGY

## 2021-06-01 PROCEDURE — 58661 LAPAROSCOPY REMOVE ADNEXA: CPT | Performed by: SPECIALIST/TECHNOLOGIST, OTHER

## 2021-06-01 PROCEDURE — 25010000002 MIDAZOLAM PER 1 MG: Performed by: NURSE ANESTHETIST, CERTIFIED REGISTERED

## 2021-06-01 PROCEDURE — 25010000002 DEXAMETHASONE PER 1 MG: Performed by: NURSE ANESTHETIST, CERTIFIED REGISTERED

## 2021-06-01 PROCEDURE — 25010000002 HYDROMORPHONE 1 MG/ML SOLUTION: Performed by: NURSE ANESTHETIST, CERTIFIED REGISTERED

## 2021-06-01 PROCEDURE — 25010000002 HYDROMORPHONE PER 4 MG: Performed by: NURSE ANESTHETIST, CERTIFIED REGISTERED

## 2021-06-01 PROCEDURE — 86901 BLOOD TYPING SEROLOGIC RH(D): CPT | Performed by: OBSTETRICS & GYNECOLOGY

## 2021-06-01 RX ORDER — DEXAMETHASONE SODIUM PHOSPHATE 4 MG/ML
INJECTION, SOLUTION INTRA-ARTICULAR; INTRALESIONAL; INTRAMUSCULAR; INTRAVENOUS; SOFT TISSUE AS NEEDED
Status: DISCONTINUED | OUTPATIENT
Start: 2021-06-01 | End: 2021-06-01 | Stop reason: SURG

## 2021-06-01 RX ORDER — HYDROMORPHONE HCL 110MG/55ML
PATIENT CONTROLLED ANALGESIA SYRINGE INTRAVENOUS AS NEEDED
Status: DISCONTINUED | OUTPATIENT
Start: 2021-06-01 | End: 2021-06-01 | Stop reason: SURG

## 2021-06-01 RX ORDER — PROPOFOL 10 MG/ML
VIAL (ML) INTRAVENOUS AS NEEDED
Status: DISCONTINUED | OUTPATIENT
Start: 2021-06-01 | End: 2021-06-01 | Stop reason: SURG

## 2021-06-01 RX ORDER — ROCURONIUM BROMIDE 10 MG/ML
INJECTION, SOLUTION INTRAVENOUS AS NEEDED
Status: DISCONTINUED | OUTPATIENT
Start: 2021-06-01 | End: 2021-06-01 | Stop reason: SURG

## 2021-06-01 RX ORDER — BUPIVACAINE HCL/0.9 % NACL/PF 0.1 %
2 PLASTIC BAG, INJECTION (ML) EPIDURAL ONCE
Status: COMPLETED | OUTPATIENT
Start: 2021-06-01 | End: 2021-06-01

## 2021-06-01 RX ORDER — OXYCODONE AND ACETAMINOPHEN 7.5; 325 MG/1; MG/1
1 TABLET ORAL ONCE AS NEEDED
Status: CANCELLED | OUTPATIENT
Start: 2021-06-01 | End: 2021-06-08

## 2021-06-01 RX ORDER — SODIUM CHLORIDE 0.9 % (FLUSH) 0.9 %
3 SYRINGE (ML) INJECTION EVERY 12 HOURS SCHEDULED
Status: DISCONTINUED | OUTPATIENT
Start: 2021-06-01 | End: 2021-06-01 | Stop reason: HOSPADM

## 2021-06-01 RX ORDER — MIDAZOLAM HYDROCHLORIDE 1 MG/ML
INJECTION INTRAMUSCULAR; INTRAVENOUS AS NEEDED
Status: DISCONTINUED | OUTPATIENT
Start: 2021-06-01 | End: 2021-06-01 | Stop reason: SURG

## 2021-06-01 RX ORDER — ONDANSETRON 2 MG/ML
INJECTION INTRAMUSCULAR; INTRAVENOUS AS NEEDED
Status: DISCONTINUED | OUTPATIENT
Start: 2021-06-01 | End: 2021-06-01 | Stop reason: SURG

## 2021-06-01 RX ORDER — SODIUM CHLORIDE, SODIUM LACTATE, POTASSIUM CHLORIDE, CALCIUM CHLORIDE 600; 310; 30; 20 MG/100ML; MG/100ML; MG/100ML; MG/100ML
125 INJECTION, SOLUTION INTRAVENOUS CONTINUOUS
Status: DISCONTINUED | OUTPATIENT
Start: 2021-06-01 | End: 2021-06-01 | Stop reason: HOSPADM

## 2021-06-01 RX ORDER — NEOSTIGMINE METHYLSULFATE 1 MG/ML
INJECTION, SOLUTION INTRAVENOUS AS NEEDED
Status: DISCONTINUED | OUTPATIENT
Start: 2021-06-01 | End: 2021-06-01 | Stop reason: SURG

## 2021-06-01 RX ORDER — ONDANSETRON 2 MG/ML
4 INJECTION INTRAMUSCULAR; INTRAVENOUS ONCE AS NEEDED
Status: DISCONTINUED | OUTPATIENT
Start: 2021-06-01 | End: 2021-06-01 | Stop reason: HOSPADM

## 2021-06-01 RX ORDER — LIDOCAINE HYDROCHLORIDE 20 MG/ML
INJECTION, SOLUTION INFILTRATION; PERINEURAL AS NEEDED
Status: DISCONTINUED | OUTPATIENT
Start: 2021-06-01 | End: 2021-06-01 | Stop reason: SURG

## 2021-06-01 RX ORDER — OXYCODONE AND ACETAMINOPHEN 10; 325 MG/1; MG/1
1 TABLET ORAL EVERY 6 HOURS PRN
Qty: 15 TABLET | Refills: 0 | Status: SHIPPED | OUTPATIENT
Start: 2021-06-01 | End: 2021-06-10

## 2021-06-01 RX ORDER — FENTANYL CITRATE 50 UG/ML
INJECTION, SOLUTION INTRAMUSCULAR; INTRAVENOUS AS NEEDED
Status: DISCONTINUED | OUTPATIENT
Start: 2021-06-01 | End: 2021-06-01 | Stop reason: SURG

## 2021-06-01 RX ORDER — SODIUM CHLORIDE 0.9 % (FLUSH) 0.9 %
10 SYRINGE (ML) INJECTION AS NEEDED
Status: DISCONTINUED | OUTPATIENT
Start: 2021-06-01 | End: 2021-06-01 | Stop reason: HOSPADM

## 2021-06-01 RX ADMIN — PROPOFOL 140 MG: 10 INJECTION, EMULSION INTRAVENOUS at 10:03

## 2021-06-01 RX ADMIN — FENTANYL CITRATE 100 MCG: 50 INJECTION INTRAMUSCULAR; INTRAVENOUS at 10:01

## 2021-06-01 RX ADMIN — Medication 2 G: at 10:07

## 2021-06-01 RX ADMIN — ONDANSETRON 4 MG: 2 INJECTION INTRAMUSCULAR; INTRAVENOUS at 10:50

## 2021-06-01 RX ADMIN — MIDAZOLAM HYDROCHLORIDE 2 MG: 2 INJECTION, SOLUTION INTRAMUSCULAR; INTRAVENOUS at 09:54

## 2021-06-01 RX ADMIN — NEOSTIGMINE METHYLSULFATE 2 MG: 1 INJECTION, SOLUTION INTRAVENOUS at 10:56

## 2021-06-01 RX ADMIN — HYDROMORPHONE HYDROCHLORIDE 0.25 MG: 2 INJECTION, SOLUTION INTRAMUSCULAR; INTRAVENOUS; SUBCUTANEOUS at 10:36

## 2021-06-01 RX ADMIN — HYDROMORPHONE HYDROCHLORIDE 0.25 MG: 2 INJECTION, SOLUTION INTRAMUSCULAR; INTRAVENOUS; SUBCUTANEOUS at 10:23

## 2021-06-01 RX ADMIN — ROCURONIUM BROMIDE 5 MG: 50 INJECTION INTRAVENOUS at 10:03

## 2021-06-01 RX ADMIN — DEXAMETHASONE SODIUM PHOSPHATE 4 MG: 4 INJECTION, SOLUTION INTRAMUSCULAR; INTRAVENOUS at 10:16

## 2021-06-01 RX ADMIN — LIDOCAINE HYDROCHLORIDE 70 MG: 20 INJECTION, SOLUTION INFILTRATION; PERINEURAL at 10:03

## 2021-06-01 RX ADMIN — GLYCOPYRROLATE 0.4 MG: 0.2 INJECTION, SOLUTION INTRAMUSCULAR; INTRAVITREAL at 10:56

## 2021-06-01 RX ADMIN — SODIUM CHLORIDE, POTASSIUM CHLORIDE, SODIUM LACTATE AND CALCIUM CHLORIDE 125 ML/HR: 600; 310; 30; 20 INJECTION, SOLUTION INTRAVENOUS at 08:14

## 2021-06-01 RX ADMIN — HYDROMORPHONE HYDROCHLORIDE 0.25 MG: 2 INJECTION, SOLUTION INTRAMUSCULAR; INTRAVENOUS; SUBCUTANEOUS at 10:43

## 2021-06-01 RX ADMIN — ROCURONIUM BROMIDE 25 MG: 50 INJECTION INTRAVENOUS at 10:04

## 2021-06-01 RX ADMIN — HYDROMORPHONE HYDROCHLORIDE 0.5 MG: 1 INJECTION, SOLUTION INTRAMUSCULAR; INTRAVENOUS; SUBCUTANEOUS at 11:26

## 2021-06-01 RX ADMIN — HYDROMORPHONE HYDROCHLORIDE 0.25 MG: 2 INJECTION, SOLUTION INTRAMUSCULAR; INTRAVENOUS; SUBCUTANEOUS at 10:54

## 2021-06-01 NOTE — BRIEF OP NOTE
SALPINGECTOMY LAPAROSCOPIC  Progress Note    Sebastian Alas  6/1/2021    Pre-op Diagnosis:   Sterilization [Z30.2]       Post-Op Diagnosis Codes:     * Sterilization [Z30.2]           Procedure(s):  SALPINGECTOMY LAPAROSCOPIC, bilateral; possible laparotomy    Surgeon(s):  Harish Johnson MD    Anesthesia: General    Staff:   Circulator: Lovely Crabtree RN; Soha Feliciano RN  Scrub Person: Tomeka Amezquita  Assistant: Cheri Valentine CSA  Assistant: Cheri Valentine CSA      Estimated Blood Loss: minimal    Urine Voided: * No values recorded between 6/1/2021 12:00 AM and 6/1/2021 10:49 AM *    Specimens:                Specimens     ID Source Type Tests Collected By Collected At Frozen?    A Fallopian Tubes, Bilateral Tissue · TISSUE PATHOLOGY EXAM   Harish Johnson MD 6/1/21 1037 No    Description: Right tube tagged    This specimen was not marked as sent.                Drains:   Urethral Catheter (Active)       Findings: None uterus slightly enlarged consistent with postpartum status.  Tubes and ovaries appeared normal.  No evidence of endometriosis    Complications: None    Assistant: Cheri Valentine CSA  was responsible for performing the following activities: Retraction, Suction, Irrigation, Suturing, Closing and Placing Dressing and their skilled assistance was necessary for the success of this case.    Harish Johnson MD     Date: 6/1/2021  Time: 10:58 CDT

## 2021-06-01 NOTE — OP NOTE
OPERATIVE NOTE  Sebastian Alas  1990 6/1/2021    PREOP DIAGNOSES:  Sterilization [Z30.2]    POSTOP DIAGNOSES:  Post-Op Diagnosis Codes:     * Sterilization [Z30.2]             Procedure(s):  bilateral SALPINGECTOMY LAPAROSCOPIC    SURGEON: Harish Johnson MD, FACOG         Assistant: Cheri Valentine CSA was responsible for performing the following activities: Retraction, Suction, Irrigation, Suturing, Closing and Placing Dressing and their skilled assistance was necessary for the success of this case.     STAFF:   Circulator: Lovely Crabtree RN; Soha Feliciano RN  Scrub Person: Tomeka Amezquita  Assistant: Cheri Valentine CSA    ANESTHESIA: General    ANESTHESIA STAFF:  Anesthesiologist: Moises Rodriguez MD  CRNA: Dylon Pinto CRNA  Student Nurse Anesthetist: Vijay Lopez SRNA; DonaldoJacki SRNA    ESTIMATED BLOOD LOSS: 100 ml     SPECIMEN:   ID Type Source Tests Collected by Time   A (Not marked as sent) : Right tube tagged Tissue Fallopian Tubes, Bilateral TISSUE PATHOLOGY EXAM Harish Johnson MD 6/1/2021 1037       FINDINGS: Uterus slightly enlarged consistent with postpartum status.  Tubes and ovaries appeared normal.  No evidence of endometriosis    COMPLICATIONS: None noted    DESCRIPTION OF OPERATION: After satisfactory general anesthesia was obtained patient was prepped and draped in modified lithotomy position in yellowfin stirrups.   A double sponge stick was placed in the vagina for manipulation.   changed gown and gloves when above a varies needle placed the various layers of the abdominal cavity.  Aspiration negative installation of 10 cc and aspiration negative.  Hanging drop test positive.  5 trocar then placed the various layers of the abdominal cavity direct visualization with Optiview.  A 5 ports were placed one in the right lower quadrant an 8 on the left lower quadrant.  The right fallopian tube was tented at the fimbriated end and dissection begun in the  mesosalpinx underneath the tube with transection after saline with the endo-seal.  This was repeated underneath the length of the tube until the cornu when it was transected.  The tube was removed through a 8 port this was repeated on the opposite side.  Pelvis was viewed under reduced pressure at 6 and no evidence of bleeding.. 8 closed with a Yeison Ogden.  The pneumoperitoneum was relieved after observably trochars removed.  Skin closed with 4-0 Monocryl.  All instruments sponges removed from the vagina cervix check no bleeding.  Procedure findings reviewed with the patient's  questions answered          This document has been electronically signed by Harish Johnson MD on June 1, 2021 14:44 CDT      Please note that portions of this note were completed with a voice recognition program.

## 2021-06-01 NOTE — ANESTHESIA POSTPROCEDURE EVALUATION
Patient: Sebastian Alas    Procedure Summary     Date: 06/01/21 Room / Location: [facility] OR [address] [place] [address] OR    Anesthesia Start: 0957 Anesthesia Stop: 1107    Procedure: SALPINGECTOMY LAPAROSCOPIC, bilateral; possible laparotomy (N/A Abdomen) Diagnosis:       Sterilization      (Sterilization [Z30.2])    Surgeons: Harish Johnson MD Provider: Moises Rodriguez MD    Anesthesia Type: general ASA Status: 3          Anesthesia Type: general    Vitals  No vitals data found for the desired time range.          Post Anesthesia Care and Evaluation    Patient location during evaluation: PACU  Level of consciousness: responsive to painful stimuli  Pain score: 0  Pain management: adequate  Airway patency: patent  Anesthetic complications: No anesthetic complications  PONV Status: none  Cardiovascular status: acceptable and hemodynamically stable  Respiratory status: acceptable, spontaneous ventilation, oral airway and face mask  Hydration status: acceptable

## 2021-06-01 NOTE — ANESTHESIA PROCEDURE NOTES
Airway  Urgency: elective    Date/Time: 6/1/2021 10:06 AM  Airway not difficult    General Information and Staff    Patient location during procedure: OR    Indications and Patient Condition  Indications for airway management: airway protection    Preoxygenated: yes  MILS maintained throughout  Mask difficulty assessment: 1 - vent by mask    Final Airway Details  Final airway type: endotracheal airway      Successful airway: ETT  Cuffed: yes   Successful intubation technique: direct laryngoscopy  Facilitating devices/methods: intubating stylet and cricoid pressure  Endotracheal tube insertion site: oral  Blade: Tracey  Blade size: 3  ETT size (mm): 7.0  Cormack-Lehane Classification: grade I - full view of glottis  Placement verified by: chest auscultation, capnometry and palpation of cuff   Cuff volume (mL): 8  Measured from: lips  ETT/EBT  to lips (cm): 21  Number of attempts at approach: 1  Assessment: lips, teeth, and gum same as pre-op and atraumatic intubation

## 2021-06-01 NOTE — ANESTHESIA PREPROCEDURE EVALUATION
Anesthesia Evaluation     Patient summary reviewed and Nursing notes reviewed   no history of anesthetic complications:  NPO Solid Status: > 8 hours  NPO Liquid Status: > 8 hours           Airway   Mallampati: I  TM distance: >3 FB  Neck ROM: full  possible difficult intubation  Dental    (+) poor dentation and edentulous    Comment: Upper edentulous with remaining lower dentition in poor repair.    Pulmonary - normal exam    breath sounds clear to auscultation  (+) a smoker Current Smoked day of surgery, asthma,  Cardiovascular - negative cardio ROS and normal exam    Rhythm: regular  Rate: normal    (-) murmur      Neuro/Psych  (+) psychiatric history Anxiety, Depression and Bipolar,     GI/Hepatic/Renal/Endo - negative ROS     Musculoskeletal (-) negative ROS    Abdominal    Substance History - negative use      Comment: History of drug abuse.   OB/GYN negative ob/gyn ROS     Comment: Breast feeding currently.      Other      history of cancer (Cervical treated.) remission    ROS/Med Hx Other: UDS pending.                  Anesthesia Plan    ASA 3     general     intravenous induction     Anesthetic plan, all risks, benefits, and alternatives have been provided, discussed and informed consent has been obtained with: patient.

## 2021-06-03 ENCOUNTER — OFFICE VISIT (OUTPATIENT)
Dept: OBSTETRICS AND GYNECOLOGY | Facility: CLINIC | Age: 31
End: 2021-06-03

## 2021-06-03 VITALS
BODY MASS INDEX: 24.88 KG/M2 | SYSTOLIC BLOOD PRESSURE: 118 MMHG | DIASTOLIC BLOOD PRESSURE: 70 MMHG | WEIGHT: 135.2 LBS | HEIGHT: 62 IN

## 2021-06-03 DIAGNOSIS — Z98.890 POSTOPERATIVE STATE: Primary | ICD-10-CM

## 2021-06-03 PROCEDURE — 99024 POSTOP FOLLOW-UP VISIT: CPT | Performed by: OBSTETRICS & GYNECOLOGY

## 2021-06-03 RX ORDER — IBUPROFEN 800 MG/1
800 TABLET ORAL EVERY 8 HOURS PRN
Qty: 60 TABLET | Refills: 0 | OUTPATIENT
Start: 2021-06-03 | End: 2021-11-04

## 2021-06-04 LAB
LAB AP CASE REPORT: NORMAL
PATH REPORT.FINAL DX SPEC: NORMAL

## 2021-06-04 NOTE — PROGRESS NOTES
Sebastian Alas is a 30 y.o. y/o female.     Chief Complaint: Postop follow-up    HPI:   30 y.o. .  No LMP recorded..  Patient is here postop follow-up bilateral salpingectomy is having some right shoulder pain.  Counseled on this          Review of Systems     Constitutional: Denies night sweats    HENT: No hearing changes, denies ear pain    Eye: No eye pain; no foreign body in eye    Pulmonary: No hemoptysis    Cardiovascular: No claudication    GI: No hematemesis    Musculoskeletal: No arthralgias, no joint swelling    Endocrine: No polydipsia or polyuria    Hematologic: Denies any free bleeding    Psychiatric: Denies any delusions    The following portions of the patient's history were reviewed and updated as appropriate: allergies, current medications, past family history, past medical history, past social history, past surgical history and problem list.    No Known Allergies     Outpatient Medications Prior to Visit   Medication Sig Dispense Refill   • oxyCODONE-acetaminophen (Percocet)  MG per tablet Take 1 tablet by mouth Every 6 (Six) Hours As Needed for Moderate Pain. 15 tablet 0     No facility-administered medications prior to visit.        The patient has a family history of   Family History   Problem Relation Age of Onset   • Colon cancer Father    • Coronary artery disease Father    • Hypertension Father    • Kidney disease Father    • Thyroid disease Father    • Thyroid disease Sister    • Coronary artery disease Paternal Grandfather    • Coronary artery disease Maternal Grandmother    • COPD Maternal Grandmother    • Coronary artery disease Maternal Grandfather    • Heart disease Maternal Grandfather    • Heart attack Maternal Grandfather    • No Known Problems Mother    • Heart defect Son    • No Known Problems Daughter    • Dementia Paternal Grandmother         Past Medical History:   Diagnosis Date   • Abnormal Pap smear of cervix    • Anxiety    • Anxiety and depression    •  Asthma    • Bipolar 1 disorder (CMS/HCC)    • Cancer (CMS/[facility])     cervical cancer had leep procedure   • Cervical dysplasia    • Chlamydia    • Cyst of ovary    • Palpitations    • Tobacco dependence syndrome    • URI (upper respiratory infection)    • Urogenital trichomoniasis    • Varicella    • Wears dentures     uppers   • Wears glasses         OB History        5    Para   3    Term   2       1    AB   2    Living   3       SAB   2    TAB        Ectopic        Molar        Multiple   0    Live Births   3                 Social History     Socioeconomic History   • Marital status:      Spouse name: Not on file   • Number of children: Not on file   • Years of education: Not on file   • Highest education level: 9th grade   Tobacco Use   • Smoking status: Current Every Day Smoker     Packs/day: 0.50     Types: Cigarettes   • Smokeless tobacco: Never Used   • Tobacco comment: patient refuses   Vaping Use   • Vaping Use: Never used   Substance and Sexual Activity   • Alcohol use: Not Currently   • Drug use: Yes     Comment: recovering addict approsx 1 year   • Sexual activity: Yes     Partners: Male     Comment: last pap at [facility]         Past Surgical History:   Procedure Laterality Date   • CERVICAL BIOPSY  W/ LOOP ELECTRODE EXCISION     • ENDOSCOPY  2012    Normal esophagus. Gastritis in stomach. Biopsy taken. Normal duodenum. Biopsy taken.   • ENDOSCOPY AND COLONOSCOPY  2012    Colitis in rectum. Biopsy taken.   • MULTIPLE TOOTH EXTRACTIONS     • SALPINGECTOMY N/A 2021    Procedure: bilateral SALPINGECTOMY LAPAROSCOPIC;  Surgeon: Harish Johnson MD;  Location: [facility];  Service: Obstetrics/Gynecology;  Laterality: N/A;        Patient Active Problem List   Diagnosis   • History of  delivery   • History of abnormal cervical Pap smear   • Positive urine drug screen   • Tobacco smoking affecting pregnancy in third trimester   • History of LEEP (loop electrosurgical  "excision procedure) of cervix complicating pregnancy   • Noncompliance   • Polyhydramnios in third trimester   • Normal labor   • Single liveborn infant delivered vaginally   • Sterilization   • Postpartum state   • H/O bilateral salpingectomy        Documented Vitals    06/03/21 1033   BP: 118/70   Weight: 61.3 kg (135 lb 3.2 oz)   Height: 157.5 cm (62\")   PainSc:   7        Body mass index is 24.73 kg/m².    Physical Exam  Constitutional: Appears to be in no acute distress; Eyes: Sclerae normal; Endocrine system: Thyroid palpation is normal; Pulmonary system: Lungs clear; Cardiovascular system: Heart regular rate and rhythm; Gastrointestinal system: Abdomen soft and nontender, active bowel sounds; Urologic system: CVA negative; Psychiatric: Appropriate insight; Neurologic: Gait within normal limits incisions appear to be healing well    Laboratory Data:   Lab Results - Last 18 Months   Lab Units 05/19/21  1212 10/30/20  1656 10/28/20  2146   GLUCOSE mg/dL 100* 83 89   BUN mg/dL 9 7 6   CREATININE mg/dL 0.71 0.53* 0.62   SODIUM mmol/L 139 135* 140   POTASSIUM mmol/L 4.4 3.5 3.4*   CHLORIDE mmol/L 103 102 103   CO2 mmol/L 26.0 24.0 26.0   CALCIUM mg/dL 10.0 9.0 9.8   TOTAL PROTEIN g/dL  --  6.2 6.7   ALBUMIN g/dL  --  3.90 4.10   ALT (SGPT) U/L  --  13 15   AST (SGOT) U/L  --  11 12   ALK PHOS U/L  --  73 77   BILIRUBIN mg/dL  --  <0.2 0.2   EGFR IF NONAFRICN AM mL/min/1.73 98 138 115   GLOBULIN gm/dL  --  2.3 2.6   A/G RATIO g/dL  --  1.7 1.6   BUN / CREAT RATIO  12.7 13.2 9.7   ANION GAP mmol/L 10.0 9.0 11.0     Lab Results - Last 18 Months   Lab Units 05/19/21  1212 04/02/21  1646 03/11/21  1004 10/30/20  1656 10/28/20  2146   WBC 10*3/mm3 10.72 20.50* 19.23* 15.18* 15.68*   RBC 10*6/mm3 5.23 4.27 4.15 4.24 4.33   HEMOGLOBIN g/dL 14.4 11.9* 12.1 13.0 13.0   HEMATOCRIT % 44.0 36.1 35.4 38.0 39.5   MCV fL 84.1 84.5 85.3 89.6 91.2   MCH pg 27.5 27.9 29.2 30.7 30.0   MCHC g/dL 32.7 33.0 34.2 34.2 32.9   RDW % 14.4 " 13.5 12.7 13.6 13.3   RDW-SD fl 44.1 41.4 38.8 44.6 45.1   MPV fL 10.1 10.1 10.4 10.0 10.0   PLATELETS 10*3/mm3 371 373 387 315 311     No results for input(s): HCGQUAL in the last 16639 hours.    Assessment        Diagnosis Plan   1. Postoperative state   I Alicia send her in for some ibuprofen want to avoid further narcotic         Plan         New Medications Ordered This Visit   Medications   • ibuprofen (ADVIL,MOTRIN) 800 MG tablet     Sig: Take 1 tablet by mouth Every 8 (Eight) Hours As Needed for Mild Pain .     Dispense:  60 tablet     Refill:  0             This document has been electronically signed by Harish Johnson MD on June 4, 2021 14:24 CDT    Please note that portions of this note were completed with a voice recognition program.

## 2021-06-08 ENCOUNTER — TELEPHONE (OUTPATIENT)
Dept: OBSTETRICS AND GYNECOLOGY | Facility: CLINIC | Age: 31
End: 2021-06-08

## 2021-06-08 NOTE — TELEPHONE ENCOUNTER
PATIENT HAD SURGERY WITH DR MAGAN FONTENOT IS NEEDING A SIGN STERILIZATION FORM PUT IN THE PATIENT CHART

## 2021-06-10 ENCOUNTER — OFFICE VISIT (OUTPATIENT)
Dept: OBSTETRICS AND GYNECOLOGY | Facility: CLINIC | Age: 31
End: 2021-06-10

## 2021-06-10 VITALS
BODY MASS INDEX: 24.84 KG/M2 | HEIGHT: 62 IN | DIASTOLIC BLOOD PRESSURE: 70 MMHG | WEIGHT: 135 LBS | SYSTOLIC BLOOD PRESSURE: 118 MMHG

## 2021-06-10 DIAGNOSIS — Z98.890 POSTOPERATIVE STATE: Primary | ICD-10-CM

## 2021-06-10 PROCEDURE — 99024 POSTOP FOLLOW-UP VISIT: CPT | Performed by: OBSTETRICS & GYNECOLOGY

## 2021-06-10 RX ORDER — OXYCODONE AND ACETAMINOPHEN 10; 325 MG/1; MG/1
1 TABLET ORAL EVERY 8 HOURS PRN
Qty: 10 TABLET | Refills: 0 | OUTPATIENT
Start: 2021-06-10 | End: 2021-11-04

## 2021-06-10 NOTE — PROGRESS NOTES
Sebastian Alas is a 30 y.o. y/o female.     Chief Complaint: Postop tibial    HPI:   30 y.o. .  No LMP recorded..  Patient is postop tubal doing fairly well still having some pain at the port site where we put in a fascial closure stitch at worst 6 of 10 depending on how she moves after reviewing risk benefits alternatives I am going to renew a few of the Percocet to supplement ibuprofen          Review of Systems     Constitutional: Denies night sweats    HENT: No hearing changes, denies ear pain    Eye: No eye pain; no foreign body in eye    Pulmonary: No hemoptysis    Cardiovascular: No claudication    GI: No hematemesis    Musculoskeletal: No arthralgias, no joint swelling    Endocrine: No polydipsia or polyuria    Hematologic: Denies any free bleeding    Psychiatric: Denies any delusions    The following portions of the patient's history were reviewed and updated as appropriate: allergies, current medications, past family history, past medical history, past social history, past surgical history and problem list.    No Known Allergies     Outpatient Medications Prior to Visit   Medication Sig Dispense Refill   • ibuprofen (ADVIL,MOTRIN) 800 MG tablet Take 1 tablet by mouth Every 8 (Eight) Hours As Needed for Mild Pain . 60 tablet 0   • oxyCODONE-acetaminophen (Percocet)  MG per tablet Take 1 tablet by mouth Every 6 (Six) Hours As Needed for Moderate Pain. 15 tablet 0     No facility-administered medications prior to visit.        The patient has a family history of   Family History   Problem Relation Age of Onset   • Colon cancer Father    • Coronary artery disease Father    • Hypertension Father    • Kidney disease Father    • Thyroid disease Father    • Thyroid disease Sister    • Coronary artery disease Paternal Grandfather    • Coronary artery disease Maternal Grandmother    • COPD Maternal Grandmother    • Coronary artery disease Maternal Grandfather    • Heart disease Maternal  Grandfather    • Heart attack Maternal Grandfather    • No Known Problems Mother    • Heart defect Son    • No Known Problems Daughter    • Dementia Paternal Grandmother         Past Medical History:   Diagnosis Date   • Abnormal Pap smear of cervix    • Anxiety    • Anxiety and depression    • Asthma    • Bipolar 1 disorder (CMS/HCC)    • Cancer (CMS/HCC)     cervical cancer had leep procedure   • Cervical dysplasia    • Chlamydia    • Cyst of ovary    • Palpitations    • Tobacco dependence syndrome    • URI (upper respiratory infection)    • Urogenital trichomoniasis    • Varicella    • Wears dentures     uppers   • Wears glasses         OB History        5    Para   3    Term   2       1    AB   2    Living   3       SAB   2    TAB        Ectopic        Molar        Multiple   0    Live Births   3                 Social History     Socioeconomic History   • Marital status:      Spouse name: Not on file   • Number of children: Not on file   • Years of education: Not on file   • Highest education level: 9th grade   Tobacco Use   • Smoking status: Current Every Day Smoker     Packs/day: 0.50     Types: Cigarettes   • Smokeless tobacco: Never Used   • Tobacco comment: patient refuses   Vaping Use   • Vaping Use: Never used   Substance and Sexual Activity   • Alcohol use: Not Currently   • Drug use: Yes     Comment: recovering addict approsx 1 year   • Sexual activity: Yes     Partners: Male     Comment: last pap at [facility]         Past Surgical History:   Procedure Laterality Date   • CERVICAL BIOPSY  W/ LOOP ELECTRODE EXCISION     • ENDOSCOPY  2012    Normal esophagus. Gastritis in stomach. Biopsy taken. Normal duodenum. Biopsy taken.   • ENDOSCOPY AND COLONOSCOPY  2012    Colitis in rectum. Biopsy taken.   • MULTIPLE TOOTH EXTRACTIONS     • SALPINGECTOMY N/A 2021    Procedure: bilateral SALPINGECTOMY LAPAROSCOPIC;  Surgeon: Harish Johnson MD;  Location: [facility];  Service:  "Obstetrics/Gynecology;  Laterality: N/A;        Patient Active Problem List   Diagnosis   • History of  delivery   • History of abnormal cervical Pap smear   • Positive urine drug screen   • Tobacco smoking affecting pregnancy in third trimester   • History of LEEP (loop electrosurgical excision procedure) of cervix complicating pregnancy   • Noncompliance   • Polyhydramnios in third trimester   • Normal labor   • Single liveborn infant delivered vaginally   • Sterilization   • Postpartum state   • H/O bilateral salpingectomy        Documented Vitals    06/10/21 1032   BP: 118/70   Weight: 61.2 kg (135 lb)   Height: 157.5 cm (62\")   PainSc:   5        Body mass index is 24.69 kg/m².    Physical Exam  Constitutional: Appears to be in no acute distress; Eyes: Sclerae normal; Endocrine system: Thyroid palpation is normal; Pulmonary system: Lungs clear; Cardiovascular system: Heart regular rate and rhythm; Gastrointestinal system: Abdomen soft and nontender, active bowel sounds; Urologic system: CVA negative; Psychiatric: Appropriate insight; Neurologic: Gait within normal limits incisions appear to be healing well    Laboratory Data:   Lab Results - Last 18 Months   Lab Units 21  1212 10/30/20  1656 10/28/20  2146   GLUCOSE mg/dL 100* 83 89   BUN mg/dL 9 7 6   CREATININE mg/dL 0.71 0.53* 0.62   SODIUM mmol/L 139 135* 140   POTASSIUM mmol/L 4.4 3.5 3.4*   CHLORIDE mmol/L 103 102 103   CO2 mmol/L 26.0 24.0 26.0   CALCIUM mg/dL 10.0 9.0 9.8   TOTAL PROTEIN g/dL  --  6.2 6.7   ALBUMIN g/dL  --  3.90 4.10   ALT (SGPT) U/L  --  13 15   AST (SGOT) U/L  --  11 12   ALK PHOS U/L  --  73 77   BILIRUBIN mg/dL  --  <0.2 0.2   EGFR IF NONAFRICN AM mL/min/1.73 98 138 115   GLOBULIN gm/dL  --  2.3 2.6   A/G RATIO g/dL  --  1.7 1.6   BUN / CREAT RATIO  12.7 13.2 9.7   ANION GAP mmol/L 10.0 9.0 11.0     Lab Results - Last 18 Months   Lab Units 21  1212 21  1646 21  1004 10/30/20  1656 10/28/20  2146   WBC " 10*3/mm3 10.72 20.50* 19.23* 15.18* 15.68*   RBC 10*6/mm3 5.23 4.27 4.15 4.24 4.33   HEMOGLOBIN g/dL 14.4 11.9* 12.1 13.0 13.0   HEMATOCRIT % 44.0 36.1 35.4 38.0 39.5   MCV fL 84.1 84.5 85.3 89.6 91.2   MCH pg 27.5 27.9 29.2 30.7 30.0   MCHC g/dL 32.7 33.0 34.2 34.2 32.9   RDW % 14.4 13.5 12.7 13.6 13.3   RDW-SD fl 44.1 41.4 38.8 44.6 45.1   MPV fL 10.1 10.1 10.4 10.0 10.0   PLATELETS 10*3/mm3 371 373 387 315 311     No results for input(s): HCGQUAL in the last 87764 hours.    Assessment        Diagnosis Plan   1. Postoperative state  oxyCODONE-acetaminophen (Percocet)  MG per tablet         Plan         New Medications Ordered This Visit   Medications   • oxyCODONE-acetaminophen (Percocet)  MG per tablet     Sig: Take 1 tablet by mouth Every 8 (Eight) Hours As Needed for Moderate Pain .     Dispense:  10 tablet     Refill:  0             This document has been electronically signed by Harish Johnson MD on Claire 10, 2021 10:43 CDT    Please note that portions of this note were completed with a voice recognition program.

## 2021-07-07 ENCOUNTER — TELEPHONE (OUTPATIENT)
Dept: OBSTETRICS AND GYNECOLOGY | Facility: CLINIC | Age: 31
End: 2021-07-07

## 2021-07-07 NOTE — TELEPHONE ENCOUNTER
DON WITH [facility] STERILIZATION FORM FAXED OVER.    MISSING:  UNDER PHYSICIAN STATEMENT, NAME OF INDIVIDUAL, DATE OF STERILIZATION, AND TYPE OF OPERATION.    PLEASE FILL OUT AND [phone].

## 2021-08-12 NOTE — ANESTHESIA PROCEDURE NOTES
Labor Epidural      Patient reassessed immediately prior to procedure    Patient location during procedure: OB  Start Time: 2/7/2019 7:45 AM  Stop Time: 2/7/2019 8:15 AM  Indication:at surgeon's request  Performed By  CRNA: Heriberto Vargas CRNA  Preanesthetic Checklist  Completed: patient identified, site marked, surgical consent, pre-op evaluation, timeout performed, IV checked, risks and benefits discussed and monitors and equipment checked  Additional Notes  Atraumatic Placement by RONAK Dill.   Prep:  Pt Position:sitting  Sterile Tech:gloves, cap and sterile barrier  Prep:povidone-iodine 7.5% surgical scrub  Monitoring:continuous pulse oximetry, EKG and blood pressure monitoring  Epidural Block Procedure:  Approach:midline  Guidance:landmark technique and palpation technique  Location:L3-L4  Needle Type:Tuohy  Needle Gauge:17 G  Loss of Resistance Medium: saline  Loss of Resistance: 5cm  Cath Depth at skin:11 cm  Paresthesia: none  Aspiration:negative  Test Dose:negative  Number of Attempts: 1  Post Assessment:  Dressing:secured with tape and occlusive dressing applied  Pt Tolerance:patient tolerated the procedure well with no apparent complications  Complications:no             Constitutional: (+) fever  Eyes/ENT: (-) visual changes   Cardiovascular: (-) chest pain, (-) syncope  Respiratory: (+) cough, (-) shortness of breath  Gastrointestinal: (-) vomiting, (-) diarrhea  Genitourinary: (-) dysuria, (-) hesitancy, (-) frequency   Musculoskeletal: (-) neck pain, (-) back pain, (-) joint pain  Integumentary: (-) rash, (-) edema  Neurological: (-) headache, (-) altered mental status  Allergic/Immunologic: (-) pruritus

## 2022-10-18 ENCOUNTER — TELEPHONE (OUTPATIENT)
Dept: OBSTETRICS AND GYNECOLOGY | Facility: CLINIC | Age: 32
End: 2022-10-18

## 2022-10-18 NOTE — TELEPHONE ENCOUNTER
Pt wants blood preg test ordered by Elizabeth..said has not tubes and is 20 days late abd positive test....[phone]

## 2022-10-19 ENCOUNTER — TELEPHONE (OUTPATIENT)
Dept: OBSTETRICS AND GYNECOLOGY | Facility: CLINIC | Age: 32
End: 2022-10-19

## 2022-10-19 ENCOUNTER — LAB (OUTPATIENT)
Dept: LAB | Facility: HOSPITAL | Age: 32
End: 2022-10-19

## 2022-10-19 DIAGNOSIS — N92.6 MISSED PERIOD: Primary | ICD-10-CM

## 2022-10-19 DIAGNOSIS — N92.6 MISSED PERIOD: ICD-10-CM

## 2022-10-19 LAB — HCG INTACT+B SERPL-ACNC: <0.1 MIU/ML

## 2022-10-19 PROCEDURE — 36415 COLL VENOUS BLD VENIPUNCTURE: CPT

## 2022-10-19 PROCEDURE — 84702 CHORIONIC GONADOTROPIN TEST: CPT

## 2022-10-21 ENCOUNTER — TELEPHONE (OUTPATIENT)
Dept: OBSTETRICS AND GYNECOLOGY | Facility: CLINIC | Age: 32
End: 2022-10-21

## (undated) DEVICE — ENDOPATH PNEUMONEEDLE INSUFFLATION NEEDLES WITH LUER LOCK CONNECTORS 120MM: Brand: ENDOPATH

## (undated) DEVICE — VISUALIZATION SYSTEM: Brand: CLEARIFY

## (undated) DEVICE — PATIENT RETURN ELECTRODE, SINGLE-USE, CONTACT QUALITY MONITORING, ADULT, WITH 9FT CORD, FOR PATIENTS WEIGING OVER 33LBS. (15KG): Brand: MEGADYNE

## (undated) DEVICE — STERILE POLYISOPRENE POWDER-FREE SURGICAL GLOVES WITH EMOLLIENT COATING: Brand: PROTEXIS

## (undated) DEVICE — ENDOPATH XCEL BLADELESS TROCARS WITH STABILITY SLEEVES: Brand: ENDOPATH XCEL

## (undated) DEVICE — PK LAP GYN 60

## (undated) DEVICE — GOWN,PREVENTION PLUS,XLNG/XXLARGE,STRL: Brand: MEDLINE

## (undated) DEVICE — ENDOPATH XCEL DILATING TIP TROCARS WITH STABILITY SLEEVES: Brand: ENDOPATH XCEL

## (undated) DEVICE — PREP SOL POVIDONE/IODINE BT 4OZ

## (undated) DEVICE — TBG INSUFFLATION W/PUSH ON CON: Brand: MEDLINE INDUSTRIES, INC.

## (undated) DEVICE — GLV SURG SENSICARE POLYISPRN W/ALOE PF LF 6.5 GRN STRL

## (undated) DEVICE — GLV SURG NEOLON 2G PF LF 6.5 STRL

## (undated) DEVICE — UNDYED BRAIDED (POLYGLACTIN 910), SYNTHETIC ABSORBABLE SUTURE: Brand: COATED VICRYL

## (undated) DEVICE — PREP PVP-I 7.5P BT 4OZ

## (undated) DEVICE — GLV SURG SENSICARE PI PF LF 7 GRN STRL

## (undated) DEVICE — MONOPOLAR METZENBAUM SCISSOR TIP, DISPOSABLE: Brand: MONOPOLAR METZENBAUM SCISSOR TIP, DISPOSABLE

## (undated) DEVICE — SUT MNCRYL 3/0 Y936H

## (undated) DEVICE — SYS CLS PORTSITE CT CLOSESURE 5AND10/12

## (undated) DEVICE — DEV UTERINE EPLORA1 SHRP W/VACULOK SYR 3MM

## (undated) DEVICE — CORE TRUMPET FOR SINGLE SOLUTION BAG: Brand: CORE DYNAMICS

## (undated) DEVICE — NDL HYPO ECLPS SFTY 25G 1 1/2IN

## (undated) DEVICE — TOTAL TRAY, 16FR 10ML SIL FOLEY, URN: Brand: MEDLINE

## (undated) DEVICE — SHEET,DRAPE,UNDERBUTTOCK,GRAD POUCH,PORT: Brand: MEDLINE

## (undated) DEVICE — GLV SURG SIGNATURE ESSENTIAL PF LTX SZ7.5

## (undated) DEVICE — ENSEAL LAPAROSCOPIC TISSUE SEALER G2 ARTICULATING  CURVED JAW FOR USE WITH G2 GENERATOR 5MM DIAMETER 35CM SHAFT LENGTH: Brand: ENSEAL

## (undated) DEVICE — SPNG GZ WOVN 4X4IN 12PLY 10/BX STRL